# Patient Record
Sex: FEMALE | Race: WHITE | NOT HISPANIC OR LATINO | Employment: FULL TIME | URBAN - METROPOLITAN AREA
[De-identification: names, ages, dates, MRNs, and addresses within clinical notes are randomized per-mention and may not be internally consistent; named-entity substitution may affect disease eponyms.]

---

## 2017-02-15 ENCOUNTER — OFFICE VISIT (OUTPATIENT)
Dept: DERMATOLOGY | Facility: CLINIC | Age: 52
End: 2017-02-15
Payer: OTHER GOVERNMENT

## 2017-02-15 DIAGNOSIS — Z12.83 SCREENING, MALIGNANT NEOPLASM, SKIN: ICD-10-CM

## 2017-02-15 DIAGNOSIS — D23.9 DERMATOFIBROMA: ICD-10-CM

## 2017-02-15 DIAGNOSIS — Z85.828 HISTORY OF SKIN CANCER: ICD-10-CM

## 2017-02-15 DIAGNOSIS — L90.5 SCAR CONDITIONS/SKIN FIBROSIS: Primary | ICD-10-CM

## 2017-02-15 DIAGNOSIS — L82.1 SEBORRHEIC KERATOSIS: ICD-10-CM

## 2017-02-15 PROCEDURE — 99999 PR PBB SHADOW E&M-EST. PATIENT-LVL II: CPT | Mod: PBBFAC,,, | Performed by: DERMATOLOGY

## 2017-02-15 PROCEDURE — 99212 OFFICE O/P EST SF 10 MIN: CPT | Mod: PBBFAC,PO | Performed by: DERMATOLOGY

## 2017-02-15 PROCEDURE — 99213 OFFICE O/P EST LOW 20 MIN: CPT | Mod: S$PBB,,, | Performed by: DERMATOLOGY

## 2017-02-15 NOTE — PATIENT INSTRUCTIONS
Preventing Skin Cancer  Relaxing in the sun may feel good. But it isnt good for your skin. In fact, being exposed to the suns harmful rays is a major cause of skin cancer. This is a serious disease that can be life-threatening. People of all ages and backgrounds are at risk. But in most cases, skin cancer can be prevented.    Your Role in Prevention  You can act today to help prevent skin cancer. Start by avoiding the suns UV (ultraviolet) rays. And dont use tanning beds, which are no safer than the sun. Taking these steps can help keep you from getting skin cancer. It can also help prevent wrinkles and other sun-induced aging effects. Make sure your children also follow these safeguards. Now is the time to start taking preventive steps against skin cancer.  When You Are Outdoors  Protect your skin when you go outdoors during the day. Take precautions whenever you go out to eat, run errands by car or on foot, or do any outdoor activity. There isnt just one easy way to protect your skin. Its best to follow all of these steps:  · Wear tightly woven clothing that covers your skin. Put on a wide-brimmed hat to protect your face, ears, and scalp.  · Watch the clock. Try to avoid the sun between 10 a.m. and 4 p.m., when it is strongest.  · Head for the shade or create your own. Use an umbrella when sitting or strolling.  · Know that the suns rays can reflect off sand, water, and snow. This can harm your skin. Take extra care when you are near reflective surfaces.  · Keep in mind that even when the weather is hazy or cloudy, your skin can be exposed to strong UV rays.  · Shield your skin with sunscreen. Also, apply sunscreen to your childrens skin.  Tips for Using Sunscreen  To help prevent skin cancer, choose the right sunscreen and use it correctly. Try the following tips:  · Choose a sunscreen that has a sun protection factor (SPF) of at least 15. For the best protection, an SPF of at least 30 is preferred.  Also, choose a sunscreen labeled broad spectrum. This will shield you from both UVA and UVB (ultraviolet A and B) rays.  · If one brand irritates your skin, try another, particularly ones without fragrance.  · Use a water-resistant sunscreen if swimming or sweating.  · Reapply sunscreen every 2 hours. If youre active, do this more often.  · Cover any sun-exposed skin, from your face to your feet. Dont forget your ears and your lips.  · Know that while sunscreen helps protect you, it isnt enough. You should also wear protective clothing. And try to stay out of the sun as much as you can, especially from 10 a.m. to 4 p.m.  © 7228-3347 [employer]. [address] [place] [address], [place] [address]. All rights reserved. This information is not intended as a substitute for professional medical care. Always follow your healthcare professional's instructions.

## 2017-02-15 NOTE — MR AVS SNAPSHOT
[facility] - Dermatology  [address] [facility] [place] [address]  Phone: [phone]  Fax: [phone]                  Tova Muñoz   2/15/2017 3:30 PM   Office Visit    Description:  Female : 1965   Provider:  Glenis Pena MD   Department:  [facility] - Dermatology           Reason for Visit     Follow-up           Diagnoses this Visit        Comments    Scar conditions/skin fibrosis    -  Primary     Screening, malignant neoplasm, skin         History of skin cancer         Seborrheic keratosis         Dermatofibroma                To Do List           Future Appointments        Provider Department Dept Phone    3/15/2017 7:40 AM Oumou Lopez MD [place]-[facility] Medicine [phone]      Goals (5 Years of Data)     None      Follow-Up and Disposition     Return in about 6 months (around 8/15/2017).      [facility]     [facility] On Call Nurse Care Line -  Assistance  Registered nurses in the [facility] On Call Center provide clinical advisement, health education, appointment booking, and other advisory services.  Call for this free service at [phone].             Medications           Message regarding Medications     Verify the changes and/or additions to your medication regime listed below are the same as discussed with your clinician today.  If any of these changes or additions are incorrect, please notify your healthcare provider.             Verify that the below list of medications is an accurate representation of the medications you are currently taking.  If none reported, the list may be blank. If incorrect, please contact your healthcare provider. Carry this list with you in case of emergency.           Current Medications     b complex vitamins tablet Take 1 tablet by mouth once daily.    cetirizine (ZYRTEC) 10 MG tablet Take 10 mg by mouth once daily.    fluticasone (FLONASE) 50 mcg/actuation nasal spray 2 sprays by Each Nare route once daily.    progesterone  (PROMETRIUM) 100 MG capsule Take 1 capsule (100 mg total) by mouth nightly.    topiramate (TOPAMAX) 25 MG tablet Take 1 tablet (25 mg total) by mouth once daily.    sumatriptan-naproxen (TREXIMET)  mg Tab Take 1 tablet by mouth once as needed.           Clinical Reference Information           Allergies as of 2/15/2017     Sulfa (Sulfonamide Antibiotics)      Immunizations Administered on Date of Encounter - 2/15/2017     None      Instructions    Preventing Skin Cancer  Relaxing in the sun may feel good. But it isnt good for your skin. In fact, being exposed to the suns harmful rays is a major cause of skin cancer. This is a serious disease that can be life-threatening. People of all ages and backgrounds are at risk. But in most cases, skin cancer can be prevented.    Your Role in Prevention  You can act today to help prevent skin cancer. Start by avoiding the suns UV (ultraviolet) rays. And dont use tanning beds, which are no safer than the sun. Taking these steps can help keep you from getting skin cancer. It can also help prevent wrinkles and other sun-induced aging effects. Make sure your children also follow these safeguards. Now is the time to start taking preventive steps against skin cancer.  When You Are Outdoors  Protect your skin when you go outdoors during the day. Take precautions whenever you go out to eat, run errands by car or on foot, or do any outdoor activity. There isnt just one easy way to protect your skin. Its best to follow all of these steps:  · Wear tightly woven clothing that covers your skin. Put on a wide-brimmed hat to protect your face, ears, and scalp.  · Watch the clock. Try to avoid the sun between 10 a.m. and 4 p.m., when it is strongest.  · Head for the shade or create your own. Use an umbrella when sitting or strolling.  · Know that the suns rays can reflect off sand, water, and snow. This can harm your skin. Take extra care when you are near reflective  surfaces.  · Keep in mind that even when the weather is hazy or cloudy, your skin can be exposed to strong UV rays.  · Shield your skin with sunscreen. Also, apply sunscreen to your childrens skin.  Tips for Using Sunscreen  To help prevent skin cancer, choose the right sunscreen and use it correctly. Try the following tips:  · Choose a sunscreen that has a sun protection factor (SPF) of at least 15. For the best protection, an SPF of at least 30 is preferred. Also, choose a sunscreen labeled broad spectrum. This will shield you from both UVA and UVB (ultraviolet A and B) rays.  · If one brand irritates your skin, try another, particularly ones without fragrance.  · Use a water-resistant sunscreen if swimming or sweating.  · Reapply sunscreen every 2 hours. If youre active, do this more often.  · Cover any sun-exposed skin, from your face to your feet. Dont forget your ears and your lips.  · Know that while sunscreen helps protect you, it isnt enough. You should also wear protective clothing. And try to stay out of the sun as much as you can, especially from 10 a.m. to 4 p.m.  © 1830-9016 [employer]. [address] [place] [address], [place] [address]. All rights reserved. This information is not intended as a substitute for professional medical care. Always follow your healthcare professional's instructions.             Language Assistance Services     ATTENTION: Language assistance services are available, free of charge. Please call [phone].      ATENCIÓN: Si habla jeanette, tiene a herzog disposición servicios gratuitos de asistencia lingüística. Llame al [phone].     CHIQUI Ý: N?u b?n nói Ti?ng Vi?t, có các d?ch v? h? tr? ngôn ng? mi?n phí dành cho b?n. G?i s? [phone].         [facility] - Dermatology complies with applicable Federal civil rights laws and does not discriminate on the basis of race, color, national origin, age, disability, or sex.

## 2017-02-15 NOTE — PROGRESS NOTES
Subjective:       Patient ID:  Tova Muñoz is a 52 y.o. female who presents for   Chief Complaint   Patient presents with    Follow-up     3 month follow up     HPI Comments: Hx of NMSC of the left nasal ala and IDN of the right nasal ala, last seen on 6/15/16.      History of Present Illness: The patient presents with chief complaint of lesion.  Location: right forehead  Duration: 6 months  Signs/Symptoms: scaly, dryness    Prior treatments: none        Review of Systems   Constitutional: Negative for fever and chills.   Gastrointestinal: Negative for nausea and vomiting.   Skin: Positive for daily sunscreen use and activity-related sunscreen use. Negative for itching, rash and recent sunburn.   Hematologic/Lymphatic: Does not bruise/bleed easily.        Objective:    Physical Exam   Constitutional: She appears well-developed and well-nourished. No distress.   Neurological: She is alert and oriented to person, place, and time. She is not disoriented.   Psychiatric: She has a normal mood and affect.   Skin:   Areas Examined (abnormalities noted in diagram):   Head / Face Inspection Performed  Neck Inspection Performed  Chest / Axilla Inspection Performed  Abdomen Inspection Performed  Back Inspection Performed  RUE Inspected  LUE Inspection Performed  Nails and Digits Inspection Performed                   Diagram Legend     Erythematous scaling macule/papule c/w actinic keratosis       Vascular papule c/w angioma      Pigmented verrucoid papule/plaque c/w seborrheic keratosis      Yellow umbilicated papule c/w sebaceous hyperplasia      Irregularly shaped tan macule c/w lentigo     1-2 mm smooth white papules consistent with Milia      Movable subcutaneous cyst with punctum c/w epidermal inclusion cyst      Subcutaneous movable cyst c/w pilar cyst      Firm pink to brown papule c/w dermatofibroma      Pedunculated fleshy papule(s) c/w skin tag(s)      Evenly pigmented macule c/w junctional nevus     Mildly  variegated pigmented, slightly irregular-bordered macule c/w mildly atypical nevus      Flesh colored to evenly pigmented papule c/w intradermal nevus       Pink pearly papule/plaque c/w basal cell carcinoma      Erythematous hyperkeratotic cursted plaque c/w SCC      Surgical scar with no sign of skin cancer recurrence      Open and closed comedones      Inflammatory papules and pustules      Verrucoid papule consistent consistent with wart     Erythematous eczematous patches and plaques     Dystrophic onycholytic nail with subungual debris c/w onychomycosis     Umbilicated papule    Erythematous-base heme-crusted tan verrucoid plaque consistent with inflamed seborrheic keratosis     Erythematous Silvery Scaling Plaque c/w Psoriasis     See annotation      Assessment / Plan:        Scar conditions/skin fibrosis  Screening, malignant neoplasm, skin  History of skin cancer  Scar of the left nasal ala, hx of NMSC.  No evidence of recurrence on physical exam today.  Continue routine skin surveillance. Daily sunscreen advised.    Seborrheic keratosis  Reassurance given. Discussed diagnosis and that lesions are benign.      Dermatofibroma  Reassurance given.  Lesions are benign.           Return in about 6 months (around 8/15/2017).

## 2017-03-06 ENCOUNTER — PATIENT OUTREACH (OUTPATIENT)
Dept: ADMINISTRATIVE | Facility: HOSPITAL | Age: 52
End: 2017-03-06

## 2017-03-06 DIAGNOSIS — Z00.00 HEALTHCARE MAINTENANCE: Primary | ICD-10-CM

## 2017-03-06 NOTE — PROGRESS NOTES
CHART AUDITED. LAB  ORDERS ENTERED PER WOG. PORTAL MESSAGE SENT TO PATIENT TO INFORM ÓSCAR OF OVERDUE HEALTH MAINTENANCE.

## 2017-03-15 ENCOUNTER — OFFICE VISIT (OUTPATIENT)
Dept: INTERNAL MEDICINE | Facility: CLINIC | Age: 52
End: 2017-03-15
Payer: OTHER GOVERNMENT

## 2017-03-15 ENCOUNTER — LAB VISIT (OUTPATIENT)
Dept: LAB | Facility: HOSPITAL | Age: 52
End: 2017-03-15
Attending: FAMILY MEDICINE
Payer: OTHER GOVERNMENT

## 2017-03-15 ENCOUNTER — TELEPHONE (OUTPATIENT)
Dept: INTERNAL MEDICINE | Facility: CLINIC | Age: 52
End: 2017-03-15

## 2017-03-15 VITALS
WEIGHT: 175.5 LBS | SYSTOLIC BLOOD PRESSURE: 128 MMHG | DIASTOLIC BLOOD PRESSURE: 86 MMHG | TEMPERATURE: 98 F | HEIGHT: 61 IN | HEART RATE: 74 BPM | BODY MASS INDEX: 33.14 KG/M2

## 2017-03-15 DIAGNOSIS — Z00.00 HEALTHCARE MAINTENANCE: ICD-10-CM

## 2017-03-15 DIAGNOSIS — R09.82 PND (POST-NASAL DRIP): ICD-10-CM

## 2017-03-15 DIAGNOSIS — G43.909 MIGRAINE WITHOUT STATUS MIGRAINOSUS, NOT INTRACTABLE, UNSPECIFIED MIGRAINE TYPE: ICD-10-CM

## 2017-03-15 DIAGNOSIS — Z11.59 NEED FOR HEPATITIS C SCREENING TEST: ICD-10-CM

## 2017-03-15 DIAGNOSIS — J06.9 VIRAL URI WITH COUGH: ICD-10-CM

## 2017-03-15 DIAGNOSIS — Z00.00 ROUTINE GENERAL MEDICAL EXAMINATION AT A HEALTH CARE FACILITY: Primary | ICD-10-CM

## 2017-03-15 DIAGNOSIS — J30.2 SEASONAL ALLERGIC RHINITIS, UNSPECIFIED ALLERGIC RHINITIS TRIGGER: Chronic | ICD-10-CM

## 2017-03-15 DIAGNOSIS — E66.9 OBESITY, UNSPECIFIED OBESITY SEVERITY, UNSPECIFIED OBESITY TYPE: ICD-10-CM

## 2017-03-15 LAB
ALBUMIN SERPL BCP-MCNC: 4.2 G/DL
ALP SERPL-CCNC: 74 U/L
ALT SERPL W/O P-5'-P-CCNC: 43 U/L
ANION GAP SERPL CALC-SCNC: 9 MMOL/L
AST SERPL-CCNC: 32 U/L
BASOPHILS # BLD AUTO: 0.03 K/UL
BASOPHILS NFR BLD: 0.4 %
BILIRUB SERPL-MCNC: 0.7 MG/DL
BUN SERPL-MCNC: 17 MG/DL
CALCIUM SERPL-MCNC: 9.8 MG/DL
CHLORIDE SERPL-SCNC: 104 MMOL/L
CHOLEST/HDLC SERPL: 3.8 {RATIO}
CO2 SERPL-SCNC: 27 MMOL/L
CREAT SERPL-MCNC: 1 MG/DL
DIFFERENTIAL METHOD: ABNORMAL
EOSINOPHIL # BLD AUTO: 0.2 K/UL
EOSINOPHIL NFR BLD: 2.9 %
ERYTHROCYTE [DISTWIDTH] IN BLOOD BY AUTOMATED COUNT: 13.1 %
EST. GFR  (AFRICAN AMERICAN): >60 ML/MIN/1.73 M^2
EST. GFR  (NON AFRICAN AMERICAN): >60 ML/MIN/1.73 M^2
GLUCOSE SERPL-MCNC: 84 MG/DL
HCT VFR BLD AUTO: 41.7 %
HDL/CHOLESTEROL RATIO: 26.4 %
HDLC SERPL-MCNC: 265 MG/DL
HDLC SERPL-MCNC: 70 MG/DL
HGB BLD-MCNC: 13.5 G/DL
LDLC SERPL CALC-MCNC: 177.6 MG/DL
LYMPHOCYTES # BLD AUTO: 1.3 K/UL
LYMPHOCYTES NFR BLD: 16.1 %
MCH RBC QN AUTO: 30.5 PG
MCHC RBC AUTO-ENTMCNC: 32.4 %
MCV RBC AUTO: 94 FL
MONOCYTES # BLD AUTO: 0.5 K/UL
MONOCYTES NFR BLD: 6.2 %
NEUTROPHILS # BLD AUTO: 6.1 K/UL
NEUTROPHILS NFR BLD: 74.3 %
NONHDLC SERPL-MCNC: 195 MG/DL
PLATELET # BLD AUTO: 293 K/UL
PMV BLD AUTO: 10.7 FL
POTASSIUM SERPL-SCNC: 4.3 MMOL/L
PROT SERPL-MCNC: 7.9 G/DL
RBC # BLD AUTO: 4.43 M/UL
SODIUM SERPL-SCNC: 140 MMOL/L
TRIGL SERPL-MCNC: 87 MG/DL
TSH SERPL DL<=0.005 MIU/L-ACNC: 2.3 UIU/ML
WBC # BLD AUTO: 8.25 K/UL

## 2017-03-15 PROCEDURE — 96372 THER/PROPH/DIAG INJ SC/IM: CPT | Mod: PBBFAC,PO

## 2017-03-15 PROCEDURE — 80061 LIPID PANEL: CPT

## 2017-03-15 PROCEDURE — 80053 COMPREHEN METABOLIC PANEL: CPT

## 2017-03-15 PROCEDURE — 36415 COLL VENOUS BLD VENIPUNCTURE: CPT | Mod: PO

## 2017-03-15 PROCEDURE — 99999 PR PBB SHADOW E&M-EST. PATIENT-LVL III: CPT | Mod: PBBFAC,,, | Performed by: FAMILY MEDICINE

## 2017-03-15 PROCEDURE — 99396 PREV VISIT EST AGE 40-64: CPT | Mod: S$PBB,,, | Performed by: FAMILY MEDICINE

## 2017-03-15 PROCEDURE — 85025 COMPLETE CBC W/AUTO DIFF WBC: CPT

## 2017-03-15 PROCEDURE — 99213 OFFICE O/P EST LOW 20 MIN: CPT | Mod: PBBFAC,PO | Performed by: FAMILY MEDICINE

## 2017-03-15 PROCEDURE — 86803 HEPATITIS C AB TEST: CPT

## 2017-03-15 PROCEDURE — 84443 ASSAY THYROID STIM HORMONE: CPT

## 2017-03-15 RX ORDER — SUMATRIPTAN AND NAPROXEN SODIUM 85; 500 MG/1; MG/1
1 TABLET, FILM COATED ORAL ONCE AS NEEDED
Qty: 10 TABLET | Refills: 11 | Status: SHIPPED | OUTPATIENT
Start: 2017-03-15 | End: 2018-03-13 | Stop reason: SDUPTHER

## 2017-03-15 RX ORDER — BENZONATATE 100 MG/1
100 CAPSULE ORAL 3 TIMES DAILY PRN
Qty: 30 CAPSULE | Refills: 3 | Status: SHIPPED | OUTPATIENT
Start: 2017-03-15 | End: 2017-04-14

## 2017-03-15 RX ORDER — MONTELUKAST SODIUM 10 MG/1
10 TABLET ORAL NIGHTLY
COMMUNITY
End: 2017-03-15 | Stop reason: SDUPTHER

## 2017-03-15 RX ORDER — BETAMETHASONE SODIUM PHOSPHATE AND BETAMETHASONE ACETATE 3; 3 MG/ML; MG/ML
12 INJECTION, SUSPENSION INTRA-ARTICULAR; INTRALESIONAL; INTRAMUSCULAR; SOFT TISSUE
Status: COMPLETED | OUTPATIENT
Start: 2017-03-15 | End: 2017-03-15

## 2017-03-15 RX ORDER — TOPIRAMATE SPINKLE 25 MG/1
25 CAPSULE ORAL 2 TIMES DAILY
COMMUNITY
End: 2017-03-15

## 2017-03-15 RX ORDER — FLUTICASONE PROPIONATE 50 MCG
2 SPRAY, SUSPENSION (ML) NASAL DAILY
Qty: 1 BOTTLE | Refills: 11 | Status: SHIPPED | OUTPATIENT
Start: 2017-03-15 | End: 2018-03-13 | Stop reason: SDUPTHER

## 2017-03-15 RX ORDER — LORATADINE 10 MG/1
10 TABLET ORAL DAILY
COMMUNITY
End: 2017-08-02

## 2017-03-15 RX ORDER — MONTELUKAST SODIUM 10 MG/1
10 TABLET ORAL NIGHTLY
Qty: 90 TABLET | Refills: 3 | Status: SHIPPED | OUTPATIENT
Start: 2017-03-15 | End: 2018-03-13 | Stop reason: SDUPTHER

## 2017-03-15 RX ADMIN — BETAMETHASONE SODIUM PHOSPHATE AND BETAMETHASONE ACETATE 12 MG: 3; 3 INJECTION, SUSPENSION INTRA-ARTICULAR; INTRALESIONAL; INTRAMUSCULAR at 08:03

## 2017-03-15 NOTE — TELEPHONE ENCOUNTER
Called and spoke with the pharmacy, verified that we did send those scripts in to them on this patient.

## 2017-03-15 NOTE — TELEPHONE ENCOUNTER
----- Message from Rae Daniel sent at 3/15/2017  1:21 PM CDT -----  Contact: [facility]  [facility] needs to verify pt's rx for Treximet, Tessalon Pearls, Flonase and Singulair. Pls call.    [facility] - [place], [place] - [address]  [address]  [place] [address]  Phone: [phone] Fax: [phone]

## 2017-03-15 NOTE — PROGRESS NOTES
Subjective:      Patient ID: Tova Muñoz is a 52 y.o. female.    Chief Complaint: Annual Exam    HPI Comments: Patient is coming in today for prevention exam.   Migraines-   She has a history of migraines for which a been controlled with Topamax daily and which she recently weaned down to25 mg a day.  She will occasionally use Treximet about once a month for abortive therapy for the headaches.  She is interested in fully coming off the Topamax at this time.  Weight gain-patient is interested in trying a medication that may help her to lose weight.  She is interested in possibly doing contrary.  She does still occasionally a single her ALLERGIES for which she'll use Claritin and Singulair.  She recently started also with a cough and cold and started using some Tessalon Perles which have helped.    She normally sees Dr. Middleton for her gynecological visits.  She'll be due in July for mammogram as well.  Last Pap smear was normal.        Lab Results   Component Value Date    WBC 8.25 03/15/2017    HGB 13.5 03/15/2017    HCT 41.7 03/15/2017     03/15/2017    CHOL 265 (H) 03/15/2017    TRIG 87 03/15/2017    HDL 70 03/15/2017    ALT 43 03/15/2017    AST 32 03/15/2017     03/15/2017    K 4.3 03/15/2017     03/15/2017    CREATININE 1.0 03/15/2017    BUN 17 03/15/2017    CO2 27 03/15/2017    TSH 2.303 03/15/2017       Review of Systems   Constitutional: Positive for unexpected weight change. Negative for chills, fatigue and fever.   HENT: Positive for postnasal drip. Negative for ear pain and trouble swallowing.    Eyes: Negative for pain and visual disturbance.   Respiratory: Positive for cough. Negative for shortness of breath.    Cardiovascular: Negative for chest pain and leg swelling.   Gastrointestinal: Negative for abdominal pain, blood in stool, nausea and vomiting.   Endocrine: Negative for cold intolerance and heat intolerance.        Hot flashes   Genitourinary: Negative for dysuria and  frequency.   Musculoskeletal: Negative for joint swelling, myalgias and neck pain.   Skin: Negative for color change and rash.   Neurological: Negative for dizziness and headaches.   Psychiatric/Behavioral: Negative for behavioral problems, decreased concentration, dysphoric mood and sleep disturbance. The patient is not nervous/anxious.      Objective:     Physical Exam   Constitutional: She is oriented to person, place, and time. She appears well-developed and well-nourished.   HENT:   Head: Normocephalic and atraumatic.   Right Ear: Tympanic membrane and external ear normal.   Left Ear: Tympanic membrane and external ear normal.   Nose: Mucosal edema and rhinorrhea present.   Mouth/Throat: Posterior oropharyngeal erythema present.   Eyes: Conjunctivae and EOM are normal.   Neck: Normal range of motion. Neck supple. No thyromegaly present.   Cardiovascular: Normal rate and regular rhythm.  Exam reveals no gallop and no friction rub.    No murmur heard.  Pulmonary/Chest: Effort normal and breath sounds normal. No respiratory distress. She has no wheezes. She has no rales.   Abdominal: Soft. Bowel sounds are normal. She exhibits no distension. There is no tenderness. There is no rebound.   Musculoskeletal: Normal range of motion. She exhibits no edema.   Lymphadenopathy:     She has no cervical adenopathy.   Neurological: She is alert and oriented to person, place, and time.   Skin: Skin is warm and dry. No rash noted.   Psychiatric: She has a normal mood and affect. Her behavior is normal. Judgment and thought content normal.   Vitals reviewed.    Assessment:     1. Routine general medical examination at a health care facility    2. Seasonal allergic rhinitis, unspecified allergic rhinitis trigger    3. Migraine without status migrainosus, not intractable, unspecified migraine type    4. Migraine without status migrainosus, not intractable, unspecified migraine type    5. Viral URI with cough    6. PND (post-nasal  drip)    7. Obesity, unspecified obesity severity, unspecified obesity type      Plan:   Tova was seen today for annual exam.    Diagnoses and all orders for this visit:    Routine general medical examination at a health care facility-labs today, discussed health maintenance issues, discussed weight loss.    Seasonal allergic rhinitis, unspecified allergic rhinitis trigger-continue Flonase and Singulair and Claritin.      Migraine without status migrainosus, not intractable, unspecified migraine type  Comments:  doing more treximet over last few months. no menses since january. wanting to wean down off topamax on 25mg daily currently. will go to every other day for 1 mo  Orders:  -     sumatriptan-naproxen (TREXIMET)  mg Tab; Take 1 tablet by mouth once as needed.    Viral URI with cough  Comments:  celestone 12, cont with singulair, add flonase back, tessalon pearles.     PND (post-nasal drip)-continue Flonase steroid injection today  -     fluticasone (FLONASE) 50 mcg/actuation nasal spray; 2 sprays by Each Nare route once daily.    Obesity, unspecified obesity severity, unspecified obesity type  Comments:  pt weaning off topamax. once off, can message for rx for contrave. also try for [employer] smart study.     Other orders  -     montelukast (SINGULAIR) 10 mg tablet; Take 1 tablet (10 mg total) by mouth every evening.  -     benzonatate (TESSALON) 100 MG capsule; Take 1 capsule (100 mg total) by mouth 3 (three) times daily as needed for Cough.  -     betamethasone acetate-betamethasone sodium phosphate injection 12 mg; Inject 2 mLs (12 mg total) into the muscle one time.            Return in about 1 year (around 3/15/2018) for physical.

## 2017-03-16 LAB — HCV AB SERPL QL IA: NEGATIVE

## 2017-04-13 DIAGNOSIS — G43.909 MIGRAINE WITHOUT STATUS MIGRAINOSUS, NOT INTRACTABLE, UNSPECIFIED MIGRAINE TYPE: ICD-10-CM

## 2017-04-13 RX ORDER — TOPIRAMATE 25 MG/1
TABLET ORAL
Qty: 90 TABLET | Refills: 3 | Status: SHIPPED | OUTPATIENT
Start: 2017-04-13 | End: 2017-08-02

## 2017-04-17 ENCOUNTER — PATIENT MESSAGE (OUTPATIENT)
Dept: INTERNAL MEDICINE | Facility: CLINIC | Age: 52
End: 2017-04-17

## 2017-07-06 RX ORDER — PROGESTERONE 100 MG/1
100 CAPSULE ORAL NIGHTLY
Qty: 30 CAPSULE | Refills: 1 | Status: SHIPPED | OUTPATIENT
Start: 2017-07-06 | End: 2017-09-01 | Stop reason: SDUPTHER

## 2017-08-02 ENCOUNTER — OFFICE VISIT (OUTPATIENT)
Dept: INTERNAL MEDICINE | Facility: CLINIC | Age: 52
End: 2017-08-02
Payer: OTHER GOVERNMENT

## 2017-08-02 VITALS
WEIGHT: 175.5 LBS | DIASTOLIC BLOOD PRESSURE: 89 MMHG | HEART RATE: 76 BPM | BODY MASS INDEX: 33.14 KG/M2 | HEIGHT: 61 IN | TEMPERATURE: 98 F | SYSTOLIC BLOOD PRESSURE: 130 MMHG

## 2017-08-02 DIAGNOSIS — Z12.31 ENCOUNTER FOR SCREENING MAMMOGRAM FOR BREAST CANCER: ICD-10-CM

## 2017-08-02 DIAGNOSIS — R03.0 ELEVATED BLOOD PRESSURE READING: Primary | ICD-10-CM

## 2017-08-02 DIAGNOSIS — N95.1 HOT FLASHES DUE TO MENOPAUSE: ICD-10-CM

## 2017-08-02 DIAGNOSIS — G43.009 MIGRAINE WITHOUT AURA AND WITHOUT STATUS MIGRAINOSUS, NOT INTRACTABLE: ICD-10-CM

## 2017-08-02 DIAGNOSIS — E66.9 OBESITY, UNSPECIFIED OBESITY SEVERITY, UNSPECIFIED OBESITY TYPE: ICD-10-CM

## 2017-08-02 PROCEDURE — 99213 OFFICE O/P EST LOW 20 MIN: CPT | Mod: PBBFAC,PO | Performed by: FAMILY MEDICINE

## 2017-08-02 PROCEDURE — 99214 OFFICE O/P EST MOD 30 MIN: CPT | Mod: S$PBB,,, | Performed by: FAMILY MEDICINE

## 2017-08-02 PROCEDURE — 99999 PR PBB SHADOW E&M-EST. PATIENT-LVL III: CPT | Mod: PBBFAC,,, | Performed by: FAMILY MEDICINE

## 2017-08-02 RX ORDER — CLONIDINE HYDROCHLORIDE 0.1 MG/1
0.1 TABLET ORAL NIGHTLY
Qty: 30 TABLET | Refills: 5 | Status: SHIPPED | OUTPATIENT
Start: 2017-08-02 | End: 2018-03-13

## 2017-08-02 NOTE — PROGRESS NOTES
Subjective:      Patient ID: Tova Muñoz is a 52 y.o. female.    Chief Complaint: Follow-up (wt, hot flashes, BP)    Patient's coming in today for follow-up of multiple issues.  Since I last saw her she's been on the contrary now for about 3 months.  She reports it's helped a lot with cravings however her weight hasn't changed recently her scale.  She did come off the Topamax but also since then she's had vein removal surgery on the right leg done by Dr. Mccoy for this is limited her activity.  She like to try to get him for another 3 months to see how it does.  Her insurance does not pay for that with a coupon it's only around $90 for her.    From a migraine standpoint she's only having about 1 migraine per month now.  She states this is doable for her.    Initially her blood pressure was elevated today however she was drinking coffee and arrived early for 0.8.  Upon resting and rechecking her numbers had improved.  She's never had a problem before with her blood pressure.    She does report that she's been having more hot flashes and decreased sleep due to menopause around 1 and 2:00 in the morning.  She's currently on Prometrium.  She scheduled to see Dr. Middleton soon.  We discussed the potential of using clonidine to help with sleep and hot flashes.  She be interested in trying this.    She's also needing to get her mammogram the same day she sees the gynecologist.        Lab Results   Component Value Date    WBC 8.25 03/15/2017    HGB 13.5 03/15/2017    HCT 41.7 03/15/2017     03/15/2017    CHOL 265 (H) 03/15/2017    TRIG 87 03/15/2017    HDL 70 03/15/2017    ALT 43 03/15/2017    AST 32 03/15/2017     03/15/2017    K 4.3 03/15/2017     03/15/2017    CREATININE 1.0 03/15/2017    BUN 17 03/15/2017    CO2 27 03/15/2017    TSH 2.303 03/15/2017       Review of Systems   Constitutional: Negative for activity change, appetite change, fatigue and unexpected weight change.   Respiratory: Negative  for cough and shortness of breath.    Cardiovascular: Negative for chest pain and palpitations.   Endocrine:        Menopausal hot flashes   Psychiatric/Behavioral: Positive for sleep disturbance. Negative for decreased concentration and dysphoric mood. The patient is not nervous/anxious.      Objective:     Physical Exam   Constitutional: She appears well-developed and well-nourished.   HENT:   Head: Normocephalic and atraumatic.   Right Ear: Tympanic membrane normal.   Left Ear: Tympanic membrane normal.   Mouth/Throat: Oropharynx is clear and moist.   Eyes: Conjunctivae and EOM are normal.   Neck: Normal range of motion. Neck supple.   Cardiovascular: Normal rate and regular rhythm.    Pulmonary/Chest: Effort normal and breath sounds normal.   Psychiatric: She has a normal mood and affect. Her behavior is normal.   Nursing note and vitals reviewed.    Assessment:     1. Elevated blood pressure reading    2. Migraine without aura and without status migrainosus, not intractable    3. Obesity, unspecified obesity severity, unspecified obesity type    4. Hot flashes due to menopause    5. Encounter for screening mammogram for breast cancer      Plan:   Tova was seen today for follow-up.    Diagnoses and all orders for this visit:    Elevated blood pressure reading  Comments:  repeat ok. monitor at home. working on diet.     Migraine without aura and without status migrainosus, not intractable  Comments:  off topamax. 1 per month now. doing ok.     Obesity, unspecified obesity severity, unspecified obesity type  Comments:  cont with contrave. work on diet and exercise.     Hot flashes due to menopause  Comments:  trial of clondine at night. seeing Dr Middleton soon. on prometrium as well.     Encounter for screening mammogram for breast cancer  -     Mammo Digital Screening Bilat with CAD; Future    Other orders  -     naltrexone-bupropion 8-90 mg TbSR; 2 tab po bid  -     cloNIDine (CATAPRES) 0.1 MG tablet; Take 1  tablet (0.1 mg total) by mouth every evening. For hot flashes and sleep            Return in about 3 months (around 11/2/2017) for wt, blood pressure, meds.

## 2017-08-25 ENCOUNTER — OFFICE VISIT (OUTPATIENT)
Dept: OBSTETRICS AND GYNECOLOGY | Facility: CLINIC | Age: 52
End: 2017-08-25
Payer: OTHER GOVERNMENT

## 2017-08-25 VITALS
SYSTOLIC BLOOD PRESSURE: 122 MMHG | WEIGHT: 176 LBS | HEIGHT: 61 IN | DIASTOLIC BLOOD PRESSURE: 70 MMHG | BODY MASS INDEX: 33.23 KG/M2

## 2017-08-25 DIAGNOSIS — Z01.419 ENCOUNTER FOR WELL WOMAN EXAM WITH ROUTINE GYNECOLOGICAL EXAM: Primary | ICD-10-CM

## 2017-08-25 PROCEDURE — 99213 OFFICE O/P EST LOW 20 MIN: CPT | Mod: PBBFAC | Performed by: OBSTETRICS & GYNECOLOGY

## 2017-08-25 PROCEDURE — 99999 PR PBB SHADOW E&M-EST. PATIENT-LVL III: CPT | Mod: PBBFAC,,, | Performed by: OBSTETRICS & GYNECOLOGY

## 2017-08-25 PROCEDURE — 99396 PREV VISIT EST AGE 40-64: CPT | Mod: S$PBB,,, | Performed by: OBSTETRICS & GYNECOLOGY

## 2017-08-25 PROCEDURE — 88142 CYTOPATH C/V THIN LAYER: CPT

## 2017-08-26 NOTE — PROGRESS NOTES
HPI:  52 y.o. female patient  presents today for annual exam.  No complaints.  She is having very irregular menses.  No menstrual cycle for 6 months, then she had two cycles in July and light one in August.  She is having night sweats and hair loss.  Normal recent TSH.  She is taking prometrium 100 mg qhs for HRT.      Past Medical History:   Diagnosis Date    Allergy     BCC (basal cell carcinoma of skin)     nose    Migraine headache     Sinusitis        Past Surgical History:   Procedure Laterality Date    BASAL CELL CARCINOMA EXCISION  2015    breast augumentation       SECTION      x2    COLONOSCOPY N/A 2016    Procedure: COLONOSCOPY;  Surgeon: Dali Rodriguez MD;  Location: [facility];  Service: Endoscopy;  Laterality: N/A;    LASIX      SKIN BIOPSY      TUBAL LIGATION         REVIEW OF SYSTEMS:  GENERAL:  No fever, chills, fatigue, or weight loss  ABDOMEN:  Normal appetite, no weight loss or abdominal pain  URINARY:  No flank pain, dysuria, or hematuria  REPRODUCTIVE:  No abnormal vaginal bleeding  BREASTS:  No tenderness, masses, or nipple discharge noted of breasts    PHYSICAL EXAM:    APPEARANCE:  Well nourished, well developed, in no acute distress  NECK:  Symmetric without masses or thyromegaly  BREASTS:  Symmetrical, no skin changes or visible lesions.  No palpable masses, nipple discharge, or adenopathy bilaterally.  ABDOMEN:  Soft, no tenderness or masses, no distension noted  PELVIC:  VULVA:  No lesions.  Normal female genitalia  URETHRAL MEATUS:  Normal size and location.  No lesions.  No prolapse  URETHRA:  No masses, tenderness, prolapse, or scarring  VAGINA:  No lesions or discharge.  No significant cystocele or rectocele  CERVIX:  No lesions.  Normal diameter, no cervical motion tenderness.  UTERUS:  4-6 week size, regular shape, mobile, non-tender, normal position, good support  ADNEXA:  No masses or tenderness  ANUS AND PERINEUM:  No lesions.  No external  hemorrhoids    1. Encounter for well woman exam with routine gynecological exam  Liquid-based pap smear, screening         PLAN:  MMG scheduled.  Patient counseled regarding recommended routine health maintenance for her age.  Treatment options for menopausal symptoms discussed.  She will continue on prometrium for now.

## 2017-09-01 RX ORDER — PROGESTERONE 100 MG/1
100 CAPSULE ORAL NIGHTLY
Qty: 30 CAPSULE | Refills: 11 | Status: SHIPPED | OUTPATIENT
Start: 2017-09-01 | End: 2018-10-01 | Stop reason: SDUPTHER

## 2017-09-06 ENCOUNTER — HOSPITAL ENCOUNTER (OUTPATIENT)
Dept: RADIOLOGY | Facility: HOSPITAL | Age: 52
Discharge: HOME | End: 2017-09-06
Attending: FAMILY MEDICINE
Payer: OTHER GOVERNMENT

## 2017-09-06 VITALS — WEIGHT: 176 LBS | HEIGHT: 61 IN | BODY MASS INDEX: 33.23 KG/M2

## 2017-09-06 DIAGNOSIS — Z12.31 ENCOUNTER FOR SCREENING MAMMOGRAM FOR BREAST CANCER: ICD-10-CM

## 2017-09-06 PROCEDURE — 77067 SCR MAMMO BI INCL CAD: CPT | Mod: 26,,, | Performed by: RADIOLOGY

## 2017-09-06 PROCEDURE — 77067 SCR MAMMO BI INCL CAD: CPT | Mod: TC

## 2017-10-04 ENCOUNTER — PATIENT MESSAGE (OUTPATIENT)
Dept: INTERNAL MEDICINE | Facility: CLINIC | Age: 52
End: 2017-10-04

## 2017-10-11 ENCOUNTER — TELEPHONE (OUTPATIENT)
Dept: INTERNAL MEDICINE | Facility: CLINIC | Age: 52
End: 2017-10-11

## 2017-10-11 NOTE — TELEPHONE ENCOUNTER
----- Message from Indy Robison sent at 10/11/2017 12:52 PM CDT -----  Contact: Pt  Pt request a call from nurse to get a PA for Treximet refill, ...  [facility] - [place] - [address]  [address]  [place] [address]  Phone: [phone] Fax: [phone]    Please contact pt at [phone]

## 2017-10-16 ENCOUNTER — PATIENT MESSAGE (OUTPATIENT)
Dept: INTERNAL MEDICINE | Facility: CLINIC | Age: 52
End: 2017-10-16

## 2017-11-07 ENCOUNTER — OFFICE VISIT (OUTPATIENT)
Dept: INTERNAL MEDICINE | Facility: CLINIC | Age: 52
End: 2017-11-07
Payer: OTHER GOVERNMENT

## 2017-11-07 VITALS
HEIGHT: 61 IN | WEIGHT: 174.38 LBS | HEART RATE: 79 BPM | DIASTOLIC BLOOD PRESSURE: 88 MMHG | BODY MASS INDEX: 32.92 KG/M2 | SYSTOLIC BLOOD PRESSURE: 120 MMHG | TEMPERATURE: 99 F

## 2017-11-07 DIAGNOSIS — R03.0 ELEVATED BP WITHOUT DIAGNOSIS OF HYPERTENSION: Primary | ICD-10-CM

## 2017-11-07 DIAGNOSIS — G89.29 CHRONIC NONINTRACTABLE HEADACHE, UNSPECIFIED HEADACHE TYPE: ICD-10-CM

## 2017-11-07 DIAGNOSIS — E66.9 OBESITY, UNSPECIFIED CLASSIFICATION, UNSPECIFIED OBESITY TYPE, UNSPECIFIED WHETHER SERIOUS COMORBIDITY PRESENT: ICD-10-CM

## 2017-11-07 DIAGNOSIS — R51.9 CHRONIC NONINTRACTABLE HEADACHE, UNSPECIFIED HEADACHE TYPE: ICD-10-CM

## 2017-11-07 DIAGNOSIS — N95.1 HOT FLASHES DUE TO MENOPAUSE: ICD-10-CM

## 2017-11-07 PROCEDURE — 99213 OFFICE O/P EST LOW 20 MIN: CPT | Mod: PBBFAC,PO | Performed by: FAMILY MEDICINE

## 2017-11-07 PROCEDURE — 99999 PR PBB SHADOW E&M-EST. PATIENT-LVL III: CPT | Mod: PBBFAC,,, | Performed by: FAMILY MEDICINE

## 2017-11-07 PROCEDURE — 99214 OFFICE O/P EST MOD 30 MIN: CPT | Mod: S$PBB,,, | Performed by: FAMILY MEDICINE

## 2017-11-08 PROBLEM — R03.0 ELEVATED BP WITHOUT DIAGNOSIS OF HYPERTENSION: Status: ACTIVE | Noted: 2017-11-08

## 2017-11-08 PROBLEM — E66.9 OBESITY: Status: ACTIVE | Noted: 2017-11-08

## 2017-11-08 PROBLEM — N95.1 HOT FLASHES DUE TO MENOPAUSE: Status: ACTIVE | Noted: 2017-11-08

## 2017-11-08 PROBLEM — G89.29 CHRONIC NONINTRACTABLE HEADACHE: Status: ACTIVE | Noted: 2017-11-08

## 2017-11-08 PROBLEM — R51.9 CHRONIC NONINTRACTABLE HEADACHE: Status: ACTIVE | Noted: 2017-11-08

## 2017-11-08 NOTE — PROGRESS NOTES
Subjective:      Patient ID: Tova Muñoz is a 52 y.o. female.    Chief Complaint: Weight Check    Patient's coming in today for follow-up of multiple issues.  Since I last saw her she's been on the contrave now for about 6 months.  She reports it's helped a lot with cravings .  She feels like her weight is just now starting to go down 1 pound per week.  She's also doing plexus.  She has stopped the Topamax.      She reports however she's been having headaches about one per week.  Sometimes this wakes her up in the middle the night.  She has a history of recurrent migraines.  She does not want to go back on the Topamax at this time however.    She also has a history with hot flashes worse lately.  Reports that she is not taking the clonidine yet but does have it filled.  Blood pressure is initially elevated today.  Has been drinking 2 cups of coffee in the a.m.  Upon repeat after she rested in the room it was normalized to 120/88.              Lab Results   Component Value Date    WBC 8.25 03/15/2017    HGB 13.5 03/15/2017    HCT 41.7 03/15/2017     03/15/2017    CHOL 265 (H) 03/15/2017    TRIG 87 03/15/2017    HDL 70 03/15/2017    ALT 43 03/15/2017    AST 32 03/15/2017     03/15/2017    K 4.3 03/15/2017     03/15/2017    CREATININE 1.0 03/15/2017    BUN 17 03/15/2017    CO2 27 03/15/2017    TSH 2.303 03/15/2017       Review of Systems   Constitutional: Negative for activity change and unexpected weight change.   HENT: Negative for hearing loss, rhinorrhea and trouble swallowing.    Eyes: Negative for discharge and visual disturbance.   Respiratory: Negative for chest tightness and wheezing.    Cardiovascular: Negative for chest pain and palpitations.   Gastrointestinal: Negative for blood in stool, constipation, diarrhea and vomiting.   Endocrine: Negative for polydipsia and polyuria.   Genitourinary: Negative for difficulty urinating, dysuria, hematuria and menstrual problem.    Musculoskeletal: Positive for arthralgias. Negative for joint swelling and neck pain.   Neurological: Positive for headaches. Negative for weakness.   Psychiatric/Behavioral: Positive for sleep disturbance. Negative for confusion and dysphoric mood.     Objective:     Physical Exam   Constitutional: She appears well-developed and well-nourished.   Obese white female   HENT:   Head: Normocephalic and atraumatic.   Right Ear: Tympanic membrane normal.   Left Ear: Tympanic membrane normal.   Mouth/Throat: Oropharynx is clear and moist.   Eyes: Conjunctivae and EOM are normal.   Neck: Normal range of motion. Neck supple.   Cardiovascular: Normal rate and regular rhythm.    Pulmonary/Chest: Effort normal and breath sounds normal.   Psychiatric: She has a normal mood and affect. Her behavior is normal.   Nursing note and vitals reviewed.    Assessment:     1. Elevated BP without diagnosis of hypertension    2. Hot flashes due to menopause    3. Chronic nonintractable headache, unspecified headache type      Plan:   Tova was seen today for weight check.    Diagnoses and all orders for this visit:    Elevated BP without diagnosis of hypertension-in a.m.  Comments:  going to start clonidine 0.1 mg at night to help with bp, sleep and ha.     Hot flashes due to menopause-not controlled  Comments:  going to start clonidine 0.1 mg at night to help with bp, sleep and ha.     Chronic nonintractable headache, unspecified headache type-not controlled  Comments:  going to start clonidine 0.1 mg at night to help with bp, sleep and ha.     Obesity-continue with medications.  -     naltrexone-bupropion 8-90 mg TbSR; Take 2 tablets by mouth 2 (two) times daily.            Return in about 3 months (around 2/7/2018) for F/u WT, Labs, Meds Dr Lopez.

## 2018-01-24 ENCOUNTER — PATIENT OUTREACH (OUTPATIENT)
Dept: ADMINISTRATIVE | Facility: HOSPITAL | Age: 53
End: 2018-01-24

## 2018-01-24 DIAGNOSIS — Z00.00 ANNUAL PHYSICAL EXAM: Primary | ICD-10-CM

## 2018-01-30 ENCOUNTER — PATIENT OUTREACH (OUTPATIENT)
Dept: ADMINISTRATIVE | Facility: HOSPITAL | Age: 53
End: 2018-01-30

## 2018-03-09 ENCOUNTER — LAB VISIT (OUTPATIENT)
Dept: LAB | Facility: HOSPITAL | Age: 53
End: 2018-03-09
Attending: FAMILY MEDICINE
Payer: OTHER GOVERNMENT

## 2018-03-09 DIAGNOSIS — Z00.00 ANNUAL PHYSICAL EXAM: ICD-10-CM

## 2018-03-09 LAB
ALBUMIN SERPL BCP-MCNC: 4.1 G/DL
ALP SERPL-CCNC: 58 U/L
ALT SERPL W/O P-5'-P-CCNC: 15 U/L
ANION GAP SERPL CALC-SCNC: 8 MMOL/L
AST SERPL-CCNC: 18 U/L
BASOPHILS # BLD AUTO: 0.03 K/UL
BASOPHILS NFR BLD: 0.5 %
BILIRUB SERPL-MCNC: 0.5 MG/DL
BUN SERPL-MCNC: 17 MG/DL
CALCIUM SERPL-MCNC: 9.2 MG/DL
CHLORIDE SERPL-SCNC: 105 MMOL/L
CHOLEST SERPL-MCNC: 253 MG/DL
CHOLEST/HDLC SERPL: 3.3 {RATIO}
CO2 SERPL-SCNC: 25 MMOL/L
CREAT SERPL-MCNC: 1 MG/DL
DIFFERENTIAL METHOD: NORMAL
EOSINOPHIL # BLD AUTO: 0.1 K/UL
EOSINOPHIL NFR BLD: 2.3 %
ERYTHROCYTE [DISTWIDTH] IN BLOOD BY AUTOMATED COUNT: 12.9 %
EST. GFR  (AFRICAN AMERICAN): >60 ML/MIN/1.73 M^2
EST. GFR  (NON AFRICAN AMERICAN): >60 ML/MIN/1.73 M^2
GLUCOSE SERPL-MCNC: 74 MG/DL
HCT VFR BLD AUTO: 41 %
HDLC SERPL-MCNC: 77 MG/DL
HDLC SERPL: 30.4 %
HGB BLD-MCNC: 13.1 G/DL
IMM GRANULOCYTES # BLD AUTO: 0.01 K/UL
IMM GRANULOCYTES NFR BLD AUTO: 0.2 %
LDLC SERPL CALC-MCNC: 162.8 MG/DL
LYMPHOCYTES # BLD AUTO: 1.5 K/UL
LYMPHOCYTES NFR BLD: 26.7 %
MCH RBC QN AUTO: 30.9 PG
MCHC RBC AUTO-ENTMCNC: 32 G/DL
MCV RBC AUTO: 97 FL
MONOCYTES # BLD AUTO: 0.4 K/UL
MONOCYTES NFR BLD: 7.5 %
NEUTROPHILS # BLD AUTO: 3.6 K/UL
NEUTROPHILS NFR BLD: 62.8 %
NONHDLC SERPL-MCNC: 176 MG/DL
NRBC BLD-RTO: 0 /100 WBC
PLATELET # BLD AUTO: 262 K/UL
PMV BLD AUTO: 10.8 FL
POTASSIUM SERPL-SCNC: 4 MMOL/L
PROT SERPL-MCNC: 7.3 G/DL
RBC # BLD AUTO: 4.24 M/UL
SODIUM SERPL-SCNC: 138 MMOL/L
TRIGL SERPL-MCNC: 66 MG/DL
TSH SERPL DL<=0.005 MIU/L-ACNC: 2.66 UIU/ML
WBC # BLD AUTO: 5.76 K/UL

## 2018-03-09 PROCEDURE — 80061 LIPID PANEL: CPT

## 2018-03-09 PROCEDURE — 85025 COMPLETE CBC W/AUTO DIFF WBC: CPT

## 2018-03-09 PROCEDURE — 36415 COLL VENOUS BLD VENIPUNCTURE: CPT | Mod: PO

## 2018-03-09 PROCEDURE — 80053 COMPREHEN METABOLIC PANEL: CPT

## 2018-03-09 PROCEDURE — 84443 ASSAY THYROID STIM HORMONE: CPT

## 2018-03-13 ENCOUNTER — OFFICE VISIT (OUTPATIENT)
Dept: INTERNAL MEDICINE | Facility: CLINIC | Age: 53
End: 2018-03-13
Payer: OTHER GOVERNMENT

## 2018-03-13 VITALS
DIASTOLIC BLOOD PRESSURE: 86 MMHG | SYSTOLIC BLOOD PRESSURE: 128 MMHG | WEIGHT: 168.19 LBS | BODY MASS INDEX: 31.75 KG/M2 | TEMPERATURE: 98 F | HEART RATE: 74 BPM | HEIGHT: 61 IN

## 2018-03-13 DIAGNOSIS — R03.0 ELEVATED BP WITHOUT DIAGNOSIS OF HYPERTENSION: ICD-10-CM

## 2018-03-13 DIAGNOSIS — N95.1 HOT FLASHES DUE TO MENOPAUSE: ICD-10-CM

## 2018-03-13 DIAGNOSIS — G43.909 MIGRAINE WITHOUT STATUS MIGRAINOSUS, NOT INTRACTABLE, UNSPECIFIED MIGRAINE TYPE: ICD-10-CM

## 2018-03-13 DIAGNOSIS — E66.9 OBESITY, UNSPECIFIED CLASSIFICATION, UNSPECIFIED OBESITY TYPE, UNSPECIFIED WHETHER SERIOUS COMORBIDITY PRESENT: ICD-10-CM

## 2018-03-13 DIAGNOSIS — J30.2 CHRONIC SEASONAL ALLERGIC RHINITIS, UNSPECIFIED TRIGGER: Chronic | ICD-10-CM

## 2018-03-13 DIAGNOSIS — R51.9 CHRONIC NONINTRACTABLE HEADACHE, UNSPECIFIED HEADACHE TYPE: ICD-10-CM

## 2018-03-13 DIAGNOSIS — R09.82 PND (POST-NASAL DRIP): ICD-10-CM

## 2018-03-13 DIAGNOSIS — Z00.00 ROUTINE GENERAL MEDICAL EXAMINATION AT A HEALTH CARE FACILITY: Primary | ICD-10-CM

## 2018-03-13 DIAGNOSIS — G89.29 CHRONIC NONINTRACTABLE HEADACHE, UNSPECIFIED HEADACHE TYPE: ICD-10-CM

## 2018-03-13 PROCEDURE — 99213 OFFICE O/P EST LOW 20 MIN: CPT | Mod: PBBFAC,PO | Performed by: FAMILY MEDICINE

## 2018-03-13 PROCEDURE — 99999 PR PBB SHADOW E&M-EST. PATIENT-LVL III: CPT | Mod: PBBFAC,,, | Performed by: FAMILY MEDICINE

## 2018-03-13 PROCEDURE — 99396 PREV VISIT EST AGE 40-64: CPT | Mod: S$PBB,,, | Performed by: FAMILY MEDICINE

## 2018-03-13 RX ORDER — PROPRANOLOL HYDROCHLORIDE 60 MG/1
60 TABLET ORAL 3 TIMES DAILY
Qty: 90 TABLET | Refills: 11 | Status: SHIPPED | OUTPATIENT
Start: 2018-03-13 | End: 2018-06-11 | Stop reason: SDUPTHER

## 2018-03-13 RX ORDER — MONTELUKAST SODIUM 10 MG/1
10 TABLET ORAL NIGHTLY
Qty: 90 TABLET | Refills: 3 | Status: SHIPPED | OUTPATIENT
Start: 2018-03-13 | End: 2021-12-28

## 2018-03-13 RX ORDER — SUMATRIPTAN AND NAPROXEN SODIUM 85; 500 MG/1; MG/1
1 TABLET, FILM COATED ORAL ONCE AS NEEDED
Qty: 10 TABLET | Refills: 11 | Status: SHIPPED | OUTPATIENT
Start: 2018-03-13 | End: 2018-10-24

## 2018-03-13 RX ORDER — FLUTICASONE PROPIONATE 50 MCG
2 SPRAY, SUSPENSION (ML) NASAL DAILY
Qty: 1 BOTTLE | Refills: 11 | Status: SHIPPED | OUTPATIENT
Start: 2018-03-13 | End: 2021-12-28

## 2018-03-13 NOTE — PROGRESS NOTES
Subjective:      Patient ID: Tova Muñoz is a 53 y.o. female.    Chief Complaint: Follow-up and Annual Exam    Disclaimer:  This note is prepared using voice recognition software and as such is likely to have errors and has not been proof read. Please contact me for questions.     Patient is coming in today for prevention exam.   Migraines-   She has a history of migraines for which a been controlled with Topamax daily and which she recently off. Doesn't want to go back on. havnig 2-3 per week. Mostly at night. waking her up. Tries alevel or execedrin migraine but if not successful then doing the Treximet.  Willing to try propranolol.     Weight gain-patient is doing the contrave down 8 lbs. Watching portions. Less cravings.     She does use the Claritin and Singulair.  Worse lately.  Taking nightly. flonase daily.     She normally sees Dr. Middleton for her gynecological visits.  She'll be due in July for mammogram as well.  Last Pap smear was normal.      She also has a history with hot flashes worse lately reports that the clonidine made it worse.         Lab Results   Component Value Date    WBC 5.76 03/09/2018    HGB 13.1 03/09/2018    HCT 41.0 03/09/2018     03/09/2018    CHOL 253 (H) 03/09/2018    TRIG 66 03/09/2018    HDL 77 (H) 03/09/2018    ALT 15 03/09/2018    AST 18 03/09/2018     03/09/2018    K 4.0 03/09/2018     03/09/2018    CREATININE 1.0 03/09/2018    BUN 17 03/09/2018    CO2 25 03/09/2018    TSH 2.661 03/09/2018       Review of Systems   Constitutional: Negative for activity change, fatigue and unexpected weight change.        Intentional wt loss. 8lb.    HENT: Negative for hearing loss, rhinorrhea and trouble swallowing.    Eyes: Negative for discharge.   Respiratory: Negative for chest tightness and wheezing.    Cardiovascular: Negative for chest pain and palpitations.   Gastrointestinal: Negative for blood in stool, constipation, diarrhea and vomiting.   Endocrine:  "Negative for polydipsia and polyuria.   Genitourinary: Negative for difficulty urinating, dysuria, hematuria and menstrual problem.   Musculoskeletal: Negative for joint swelling and neck pain.   Neurological: Positive for headaches. Negative for weakness.   Psychiatric/Behavioral: Positive for sleep disturbance. Negative for confusion and dysphoric mood.     Objective:     Vitals:    03/13/18 0752   BP: 128/86   Pulse: 74   Temp: 98.1 °F (36.7 °C)   TempSrc: Tympanic   Weight: 76.3 kg (168 lb 3.4 oz)   Height: 5' 1" (1.549 m)     Physical Exam   Constitutional: She appears well-developed and well-nourished.   HENT:   Head: Normocephalic and atraumatic.   Right Ear: Tympanic membrane normal.   Left Ear: Tympanic membrane normal.   Mouth/Throat: Oropharynx is clear and moist.   Eyes: Conjunctivae and EOM are normal.   Neck: Normal range of motion. Neck supple.   Cardiovascular: Normal rate and regular rhythm.    Pulmonary/Chest: Effort normal and breath sounds normal.   Abdominal: Soft. Bowel sounds are normal. She exhibits no distension and no mass. There is no tenderness. There is no guarding.   Psychiatric: She has a normal mood and affect. Her behavior is normal.   Nursing note and vitals reviewed.    Assessment:     1. Routine general medical examination at a health care facility    2. Chronic nonintractable headache, unspecified headache type    3. Elevated BP without diagnosis of hypertension    4. Hot flashes due to menopause    5. Obesity, unspecified classification, unspecified obesity type, unspecified whether serious comorbidity present    6. Chronic seasonal allergic rhinitis, unspecified trigger    7. Chronic migraine without aura without status migrainosus, not intractable    8. Migraine without status migrainosus, not intractable, unspecified migraine type    9. PND (post-nasal drip)      Plan:   Tova was seen today for follow-up and annual exam.    Diagnoses and all orders for this visit:    Routine " general medical examination at a health care facility - labs reviewed. Discussed Health Maintenance issues.       Chronic nonintractable headache, unspecified headache type- worse lately since off topamax. Not wanting to restart. Add propranolol at night can increase to bid to tid.     Elevated BP without diagnosis of hypertension adding propranolol.     Hot flashes due to menopause- not improved with clonidine. Still on progesterone.     Obesity, unspecified classification, unspecified obesity type, unspecified whether serious comorbidity present- wt down 8lbs. On contrave. Discussed diet changes with lower sugar.     Chronic seasonal allergic rhinitis, unspecified trigger worse lately. Increase flonase to bid.     Chronic migraine without aura without status migrainosus, not intractable- refilled treximet.       Orders:  -     SUMAtriptan-naproxen (TREXIMET)  mg Tab; Take 1 tablet by mouth once as needed.    PND (post-nasal drip)- refilled.   -     fluticasone (FLONASE) 50 mcg/actuation nasal spray; 2 sprays (100 mcg total) by Each Nare route once daily.    Other orders  -     propranolol (INDERAL) 60 MG tablet; Take 1 tablet (60 mg total) by mouth 3 (three) times daily. For prevention of migraines.  -     naltrexone-bupropion 8-90 mg TbSR; Take 2 tablets by mouth 2 (two) times daily.  -     montelukast (SINGULAIR) 10 mg tablet; Take 1 tablet (10 mg total) by mouth every evening.            Follow-up in about 3 months (around 6/13/2018) for F/u WT, Labs, Meds Dr Lopez.

## 2018-06-11 ENCOUNTER — OFFICE VISIT (OUTPATIENT)
Dept: INTERNAL MEDICINE | Facility: CLINIC | Age: 53
End: 2018-06-11
Payer: OTHER GOVERNMENT

## 2018-06-11 VITALS
DIASTOLIC BLOOD PRESSURE: 78 MMHG | HEIGHT: 61 IN | HEART RATE: 70 BPM | SYSTOLIC BLOOD PRESSURE: 110 MMHG | BODY MASS INDEX: 32.76 KG/M2 | TEMPERATURE: 98 F | WEIGHT: 173.5 LBS

## 2018-06-11 DIAGNOSIS — J30.2 SEASONAL ALLERGIC RHINITIS, UNSPECIFIED TRIGGER: Chronic | ICD-10-CM

## 2018-06-11 DIAGNOSIS — R51.9 CHRONIC NONINTRACTABLE HEADACHE, UNSPECIFIED HEADACHE TYPE: Primary | ICD-10-CM

## 2018-06-11 DIAGNOSIS — E66.9 OBESITY, UNSPECIFIED CLASSIFICATION, UNSPECIFIED OBESITY TYPE, UNSPECIFIED WHETHER SERIOUS COMORBIDITY PRESENT: ICD-10-CM

## 2018-06-11 DIAGNOSIS — G89.29 CHRONIC NONINTRACTABLE HEADACHE, UNSPECIFIED HEADACHE TYPE: Primary | ICD-10-CM

## 2018-06-11 PROCEDURE — 99999 PR PBB SHADOW E&M-EST. PATIENT-LVL III: CPT | Mod: PBBFAC,,, | Performed by: FAMILY MEDICINE

## 2018-06-11 PROCEDURE — 99213 OFFICE O/P EST LOW 20 MIN: CPT | Mod: PBBFAC,PO | Performed by: FAMILY MEDICINE

## 2018-06-11 PROCEDURE — 99214 OFFICE O/P EST MOD 30 MIN: CPT | Mod: S$PBB,,, | Performed by: FAMILY MEDICINE

## 2018-06-11 RX ORDER — PROPRANOLOL HYDROCHLORIDE 60 MG/1
60 TABLET ORAL NIGHTLY
Qty: 90 TABLET | Refills: 3
Start: 2018-06-11 | End: 2020-07-28 | Stop reason: SDUPTHER

## 2018-06-11 RX ORDER — PHENTERMINE HYDROCHLORIDE 37.5 MG/1
TABLET ORAL
Qty: 30 TABLET | Refills: 0 | Status: SHIPPED | OUTPATIENT
Start: 2018-06-11 | End: 2018-07-17 | Stop reason: SDUPTHER

## 2018-06-11 NOTE — PROGRESS NOTES
Subjective:      Patient ID: Tova Muñoz is a 53 y.o. female.    Chief Complaint: Other (weight )    Disclaimer:  This note is prepared using voice recognition software and as such is likely to have errors and has not been proof read. Please contact me for questions.     Patient is coming in today for f/u of chronic issues.     Migraines-   She has a history of migraines for which a been controlled again.  Does not want to go back on Topamax.  Finds that the propanolol at 60 mg at nighttime seems to be helping a good bit.  Much less frequent.  Wants to continue with this.    Weight gain-patient is doing the contrave but now weight is back again up around 5 lb.  Does not really feel that the contrary was helping much now.  Is not as motivated to exercise.  Previously ran but injured her knee.  Would be willing to do possibly some swimming.  Has a good friend may also help her to stay accountable.  No issues with high blood pressure at this time or palpitations.    She does use the Claritin and Singulair and flonase. Improved.     She normally sees Dr. Middleton for her gynecological visits.          Lab Results   Component Value Date    WBC 5.76 03/09/2018    HGB 13.1 03/09/2018    HCT 41.0 03/09/2018     03/09/2018    CHOL 253 (H) 03/09/2018    TRIG 66 03/09/2018    HDL 77 (H) 03/09/2018    ALT 15 03/09/2018    AST 18 03/09/2018     03/09/2018    K 4.0 03/09/2018     03/09/2018    CREATININE 1.0 03/09/2018    BUN 17 03/09/2018    CO2 25 03/09/2018    TSH 2.661 03/09/2018       Review of Systems   Constitutional: Positive for appetite change, fatigue and unexpected weight change. Negative for activity change.   HENT: Negative for hearing loss, rhinorrhea and trouble swallowing.    Eyes: Negative for discharge and visual disturbance.   Respiratory: Negative for chest tightness and wheezing.    Cardiovascular: Negative for chest pain and palpitations.   Gastrointestinal: Positive for constipation.  "Negative for blood in stool, diarrhea and vomiting.   Endocrine: Negative for polydipsia and polyuria.   Genitourinary: Negative for difficulty urinating, dysuria, hematuria and menstrual problem.   Musculoskeletal: Positive for arthralgias. Negative for joint swelling and neck pain.   Neurological: Negative for weakness and headaches.   Psychiatric/Behavioral: Negative for confusion and dysphoric mood.     Objective:     Vitals:    06/11/18 0802   BP: 110/78   Pulse: 70   Temp: 97.6 °F (36.4 °C)   TempSrc: Tympanic   Weight: 78.7 kg (173 lb 8 oz)   Height: 5' 1" (1.549 m)     Physical Exam   Constitutional: She appears well-developed and well-nourished.   bmi at 32, O WF   HENT:   Head: Normocephalic and atraumatic.   Right Ear: Tympanic membrane normal.   Left Ear: Tympanic membrane normal.   Mouth/Throat: Oropharynx is clear and moist.   Eyes: Conjunctivae and EOM are normal.   Neck: Normal range of motion. Neck supple.   Cardiovascular: Normal rate and regular rhythm.    Pulmonary/Chest: Effort normal and breath sounds normal.   Psychiatric: She has a normal mood and affect. Her behavior is normal.   Nursing note and vitals reviewed.    Assessment:     1. Chronic nonintractable headache, unspecified headache type    2. Obesity, unspecified classification, unspecified obesity type, unspecified whether serious comorbidity present    3. Seasonal allergic rhinitis, unspecified trigger      Plan:   Tova was seen today for other.    Diagnoses and all orders for this visit:    Chronic nonintractable headache, unspecified headache type-stable with propanolol at 60 mg at night however with adding stimulant therapy for weight loss may need did increase dosing.  She will follow back up in 5 weeks.    Obesity, unspecified classification, unspecified obesity type, unspecified whether serious comorbidity present-wean down to 1 tablet a contribute daily x1 week then stop.  Start then half tablet to a full tablet of Adipex " daily in the a.m. with water.  Discussed possible risk factors of increasing blood pressure and palpitations and dry mouth.  Begin daily exercise looking to swimming.  Discussed dietary changes and motivational issues.  Follow back up in 5 weeks for blood pressure and weight check.    Seasonal allergic rhinitis, unspecified trigger-stable currently with Flonase Singulair and Zyrtec.    Other orders  -     propranolol (INDERAL) 60 MG tablet; Take 1 tablet (60 mg total) by mouth every evening. For prevention of migraines.  -     phentermine (ADIPEX-P) 37.5 mg tablet; 1/2- 1 tab po QAM            Follow-up in about 5 weeks (around 7/16/2018) for F/u WT, Meds Dr Lopez.

## 2018-07-09 ENCOUNTER — PATIENT MESSAGE (OUTPATIENT)
Dept: INTERNAL MEDICINE | Facility: CLINIC | Age: 53
End: 2018-07-09

## 2018-07-16 ENCOUNTER — PATIENT MESSAGE (OUTPATIENT)
Dept: INTERNAL MEDICINE | Facility: CLINIC | Age: 53
End: 2018-07-16

## 2018-07-17 ENCOUNTER — OFFICE VISIT (OUTPATIENT)
Dept: INTERNAL MEDICINE | Facility: CLINIC | Age: 53
End: 2018-07-17
Payer: OTHER GOVERNMENT

## 2018-07-17 VITALS
SYSTOLIC BLOOD PRESSURE: 110 MMHG | HEART RATE: 68 BPM | WEIGHT: 168.19 LBS | BODY MASS INDEX: 31.75 KG/M2 | DIASTOLIC BLOOD PRESSURE: 84 MMHG | TEMPERATURE: 98 F | HEIGHT: 61 IN

## 2018-07-17 DIAGNOSIS — R03.0 ELEVATED BP WITHOUT DIAGNOSIS OF HYPERTENSION: Primary | ICD-10-CM

## 2018-07-17 DIAGNOSIS — J30.2 SEASONAL ALLERGIC RHINITIS, UNSPECIFIED TRIGGER: Chronic | ICD-10-CM

## 2018-07-17 DIAGNOSIS — G89.29 CHRONIC NONINTRACTABLE HEADACHE, UNSPECIFIED HEADACHE TYPE: ICD-10-CM

## 2018-07-17 DIAGNOSIS — E66.9 CLASS 1 OBESITY WITHOUT SERIOUS COMORBIDITY WITH BODY MASS INDEX (BMI) OF 31.0 TO 31.9 IN ADULT, UNSPECIFIED OBESITY TYPE: ICD-10-CM

## 2018-07-17 DIAGNOSIS — R51.9 CHRONIC NONINTRACTABLE HEADACHE, UNSPECIFIED HEADACHE TYPE: ICD-10-CM

## 2018-07-17 PROCEDURE — 99213 OFFICE O/P EST LOW 20 MIN: CPT | Mod: PBBFAC,PO | Performed by: FAMILY MEDICINE

## 2018-07-17 PROCEDURE — 99999 PR PBB SHADOW E&M-EST. PATIENT-LVL III: CPT | Mod: PBBFAC,,, | Performed by: FAMILY MEDICINE

## 2018-07-17 PROCEDURE — 99214 OFFICE O/P EST MOD 30 MIN: CPT | Mod: S$PBB,,, | Performed by: FAMILY MEDICINE

## 2018-07-17 RX ORDER — PHENTERMINE HYDROCHLORIDE 37.5 MG/1
TABLET ORAL
Qty: 30 TABLET | Refills: 0 | Status: SHIPPED | OUTPATIENT
Start: 2018-07-17 | End: 2018-10-04

## 2018-07-17 NOTE — PROGRESS NOTES
Subjective:      Patient ID: Tova Muñoz is a 53 y.o. female.    Chief Complaint: Weight Check    Disclaimer:  This note is prepared using voice recognition software and as such is likely to have errors and has not been proof read. Please contact me for questions.     Patient is coming in today for f/u of chronic issues.     Migraines-   She has a history of migraines for which a been controlled again.  When she was sick she stopped all of her medications and us when she started back on the propanolol had 1 migraine but otherwise doing well.  Does not want to go back on Topamax.    Weight gain-wt down 5lbs now. Back to baseline.  Finds that the whole tablet phentermine does help.  Just has not yet engaged with regular exercise.  Needs accountability partner    Not swimming yet. Interested in doing so though for joints, weight loss, and mood.     She does use the Claritin and Singulair and flonase. Improved.     She normally sees Dr. Middleton for her gynecological visits.          Lab Results   Component Value Date    WBC 5.76 03/09/2018    HGB 13.1 03/09/2018    HCT 41.0 03/09/2018     03/09/2018    CHOL 253 (H) 03/09/2018    TRIG 66 03/09/2018    HDL 77 (H) 03/09/2018    ALT 15 03/09/2018    AST 18 03/09/2018     03/09/2018    K 4.0 03/09/2018     03/09/2018    CREATININE 1.0 03/09/2018    BUN 17 03/09/2018    CO2 25 03/09/2018    TSH 2.661 03/09/2018       Review of Systems   Constitutional: Positive for appetite change. Negative for activity change, fatigue and unexpected weight change.        Intentional weight loss   HENT: Positive for postnasal drip and rhinorrhea. Negative for congestion, sinus pain, sinus pressure, trouble swallowing and voice change.    Respiratory: Negative for cough and shortness of breath.    Cardiovascular: Negative for chest pain, palpitations and leg swelling.   Neurological: Positive for headaches. Negative for light-headedness.   Psychiatric/Behavioral: Negative  "for decreased concentration, dysphoric mood and sleep disturbance. The patient is not nervous/anxious.      Objective:     Vitals:    07/17/18 0717   BP: 110/84   BP Location: Left arm   Patient Position: Sitting   BP Method: Large (Manual)   Pulse: 68   Temp: 97.9 °F (36.6 °C)   TempSrc: Tympanic   Weight: 76.3 kg (168 lb 3.4 oz)   Height: 5' 1" (1.549 m)     Physical Exam   Constitutional: She appears well-developed and well-nourished.   HENT:   Head: Normocephalic and atraumatic.   Right Ear: Tympanic membrane normal.   Left Ear: Tympanic membrane normal.   Mouth/Throat: Oropharynx is clear and moist.   Eyes: Conjunctivae and EOM are normal.   Neck: Normal range of motion. Neck supple.   Cardiovascular: Normal rate and regular rhythm.    Pulmonary/Chest: Effort normal and breath sounds normal.   Psychiatric: She has a normal mood and affect. Her behavior is normal.   Nursing note and vitals reviewed.    Assessment:     1. Elevated BP without diagnosis of hypertension    2. Class 1 obesity without serious comorbidity with body mass index (BMI) of 31.0 to 31.9 in adult, unspecified obesity type    3. Seasonal allergic rhinitis, unspecified trigger    4. Chronic nonintractable headache, unspecified headache type      Plan:   Tova was seen today for weight check.    Diagnoses and all orders for this visit:    Elevated BP without diagnosis of hypertension-stable with use of propanolol mainly more for headaches but also controlling blood pressure in the setting of using a stimulant therapy.  Continue with weight loss    Class 1 obesity without serious comorbidity with body mass index (BMI) of 31.0 to 31.9 in adult, unspecified obesity type  Comments:  adipex 5lbs down, improving, consider trying to do every other day for now.     Seasonal allergic rhinitis, unspecified trigger  Comments:  cont wiht flonase and zyrtec, singulair.     Chronic nonintractable headache, unspecified headache type the-her resuming back on " propanolol continue with weight loss    Other orders  -     phentermine (ADIPEX-P) 37.5 mg tablet; 1/2- 1 tab po QAM            Follow-up in about 3 months (around 10/17/2018) for F/u WT, Labs, Meds Dr Lopez.

## 2018-08-17 ENCOUNTER — TELEPHONE (OUTPATIENT)
Dept: OBSTETRICS AND GYNECOLOGY | Facility: CLINIC | Age: 53
End: 2018-08-17

## 2018-08-17 DIAGNOSIS — Z12.39 BREAST CANCER SCREENING: Primary | ICD-10-CM

## 2018-09-22 ENCOUNTER — OFFICE VISIT (OUTPATIENT)
Dept: URGENT CARE | Facility: CLINIC | Age: 53
End: 2018-09-22
Payer: OTHER GOVERNMENT

## 2018-09-22 VITALS
HEART RATE: 59 BPM | WEIGHT: 174.63 LBS | OXYGEN SATURATION: 99 % | RESPIRATION RATE: 17 BRPM | TEMPERATURE: 98 F | DIASTOLIC BLOOD PRESSURE: 78 MMHG | SYSTOLIC BLOOD PRESSURE: 118 MMHG | HEIGHT: 61 IN | BODY MASS INDEX: 32.97 KG/M2
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
## 2018-09-22 DIAGNOSIS — S61.451A CAT BITE OF RIGHT HAND, INITIAL ENCOUNTER: Primary | ICD-10-CM

## 2018-09-22 PROCEDURE — 99213 OFFICE O/P EST LOW 20 MIN: CPT | Mod: PBBFAC,PO

## 2018-09-22 PROCEDURE — 99213 OFFICE O/P EST LOW 20 MIN: CPT | Mod: S$PBB,,,

## 2018-09-22 PROCEDURE — 99999 PR PBB SHADOW E&M-EST. PATIENT-LVL III: CPT | Mod: PBBFAC,,,

## 2018-09-22 RX ORDER — AMOXICILLIN AND CLAVULANATE POTASSIUM 875; 125 MG/1; MG/1
1 TABLET, FILM COATED ORAL EVERY 12 HOURS
Qty: 20 TABLET | Refills: 0 | Status: SHIPPED | OUTPATIENT
Start: 2018-09-22 | End: 2018-10-02

## 2018-09-22 NOTE — PATIENT INSTRUCTIONS
Cat Bite    A cat bite can cause a wound deep enough to break the skin. In such cases, the wound is cleaned and then sometimes closed. If the wound is closed it is usually not closed completely. This is so that fluid can drain if the wound becomes infected. Often the wound is left open to heal. In addition to wound care, a tetanus shot may be given, if needed.  Home care  · Wash your hands well with soap and warm water before and after caring for the wound. This helps lower the risk of infection.  · Care for the wound as directed. If a dressing was applied to the wound, be sure to change it as directed.  · If the wound bleeds, place a clean, soft cloth on the wound. Then firmly apply pressure until the bleeding stops. This may take up to 5 minutes. Do not release the pressure and look at the wound during this time.  · Always get medical attention for cat bites on the hand. They are highly likely to become infected.  · Most wounds heal within 10 days. But an infection can occur even with proper treatment. So be sure to check the wound daily for signs of infection (see below).  · Antibiotics may be prescribed. These help prevent or treat infection. If youre given antibiotics, take them as directed. Also be sure to complete the medicines.  Rabies prevention  Rabies is a virus that can be carried in certain animals. These can include domestic animals such as cats and dogs. Pets fully vaccinated against rabies (2 shots) are at very low risk of infection. But because human rabies is almost always fatal, any biting pet should be confined for 10 days as an extra precaution. In general, if there is a risk for rabies, the following steps may need to be taken:  · If someones pet cat has bitten you, it should be kept in a secure area for the next 10 days to watch for signs of illness. (If the pet owner wont allow this, contact your local animal control center.) If the cat becomes ill or dies during that time, contact your  local animal control center at once so the animal may be tested for rabies. If the cat stays healthy for the next 10 days, there is no danger of rabies in the animal or you.  · If a stray cat bit you, contact your local animal control center. They can give information on capture, quarantine, and animal rabies testing.  · If you cant find the animal that bit you in the next 2 days, and if rabies exists in your area, you may need to receive the rabies vaccine series. Call your healthcare provider right away. Or return to the emergency department promptly.  · All animal bites should be reported to the local animal control center. If you were not given a form to fill out, you can report this yourself.  Follow-up care  Follow up with your healthcare provider, or as directed.  When to seek medical advice  Call your healthcare provider right away if any of these occur:  · Signs of infection:  ¨ Spreading redness or warmth from the wound  ¨ Increased pain or swelling  ¨ Fever of 100.4ºF (38ºC) or higher, or as directed by your healthcare provider  ¨ Colored fluid or pus draining from the wound  ¨ Enlarged lymph nodes above the area that was bitten, such as lymph nodes in the armpit if you were bitten on the hand or arm. This may be a sign of cat-scratch disease (cat-scratch fever).  · Signs of rabies infection:  ¨ Headache  ¨ Confusion  ¨ Strange behavior  ¨ Increased salivating or drooling  ¨ Seizure  · Decreased ability to move any body part near the bite area  · Bleeding that can't be stopped after 5 minutes of firm pressure  Date Last Reviewed: 3/23/2015  © 6158-6333 [employer]. [address] [place] [address], [place] [address]. All rights reserved. This information is not intended as a substitute for professional medical care. Always follow your healthcare professional's instructions.        Cat Bite    A cat bite can cause a wound deep enough to break the skin. In such cases, the wound is cleaned and  then sometimes closed. If the wound is closed it is usually not closed completely. This is so that fluid can drain if the wound becomes infected. Often the wound is left open to heal. In addition to wound care, a tetanus shot may be given, if needed.  Home care  · Wash your hands well with soap and warm water before and after caring for the wound. This helps lower the risk of infection.  · Care for the wound as directed. If a dressing was applied to the wound, be sure to change it as directed.  · If the wound bleeds, place a clean, soft cloth on the wound. Then firmly apply pressure until the bleeding stops. This may take up to 5 minutes. Do not release the pressure and look at the wound during this time.  · Always get medical attention for cat bites on the hand. They are highly likely to become infected.  · Most wounds heal within 10 days. But an infection can occur even with proper treatment. So be sure to check the wound daily for signs of infection (see below).  · Antibiotics may be prescribed. These help prevent or treat infection. If youre given antibiotics, take them as directed. Also be sure to complete the medicines.  Rabies prevention  Rabies is a virus that can be carried in certain animals. These can include domestic animals such as cats and dogs. Pets fully vaccinated against rabies (2 shots) are at very low risk of infection. But because human rabies is almost always fatal, any biting pet should be confined for 10 days as an extra precaution. In general, if there is a risk for rabies, the following steps may need to be taken:  · If someones pet cat has bitten you, it should be kept in a secure area for the next 10 days to watch for signs of illness. (If the pet owner wont allow this, contact your local animal control center.) If the cat becomes ill or dies during that time, contact your local animal control center at once so the animal may be tested for rabies. If the cat stays healthy for the next  10 days, there is no danger of rabies in the animal or you.  · If a stray cat bit you, contact your local animal control center. They can give information on capture, quarantine, and animal rabies testing.  · If you cant find the animal that bit you in the next 2 days, and if rabies exists in your area, you may need to receive the rabies vaccine series. Call your healthcare provider right away. Or return to the emergency department promptly.  · All animal bites should be reported to the local animal control center. If you were not given a form to fill out, you can report this yourself.  Follow-up care  Follow up with your healthcare provider, or as directed.  When to seek medical advice  Call your healthcare provider right away if any of these occur:  · Signs of infection:  ¨ Spreading redness or warmth from the wound  ¨ Increased pain or swelling  ¨ Fever of 100.4ºF (38ºC) or higher, or as directed by your healthcare provider  ¨ Colored fluid or pus draining from the wound  ¨ Enlarged lymph nodes above the area that was bitten, such as lymph nodes in the armpit if you were bitten on the hand or arm. This may be a sign of cat-scratch disease (cat-scratch fever).  · Signs of rabies infection:  ¨ Headache  ¨ Confusion  ¨ Strange behavior  ¨ Increased salivating or drooling  ¨ Seizure  · Decreased ability to move any body part near the bite area  · Bleeding that can't be stopped after 5 minutes of firm pressure  Date Last Reviewed: 3/23/2015  © 3959-5804 [employer]. [address] [place] [address], [place] [address]. All rights reserved. This information is not intended as a substitute for professional medical care. Always follow your healthcare professional's instructions.

## 2018-09-22 NOTE — PROGRESS NOTES
Subjective:       Patient ID: Tova Muñoz is a 53 y.o. female      Chief Complaint:   Chief Complaint   Patient presents with    Animal Bite         Tova Muñoz presents to clinic stating she was bitten by her cat on yesterday. She states she came in today because she began to experience some mild tenderness on yesterday evening. She denies any fever. Tetanus was within the last 2 years. Cat is up to date on shots.       Animal Bite    The incident occurred yesterday. The incident occurred at home. There is an injury to the right hand. The pain is mild. It is unlikely that a foreign body is present. Pertinent negatives include no chest pain, no abdominal pain, no nausea, no vomiting, no headaches and no cough. Her tetanus status is UTD. She has been behaving normally. Services received include medications given.         Review of Systems   Constitutional: Negative.  Negative for fever and malaise/fatigue.   HENT: Negative.  Negative for congestion, ear pain and sore throat.    Respiratory: Negative.  Negative for cough, shortness of breath and wheezing.    Cardiovascular: Negative for chest pain, palpitations and leg swelling.   Gastrointestinal: Negative for abdominal pain, diarrhea, nausea and vomiting.   Musculoskeletal: Negative.  Negative for myalgias.   Skin: Negative.         +puncture wound secondary to cat bite   Neurological: Negative for dizziness and headaches.   Psychiatric/Behavioral: Negative.    All other systems reviewed and are negative.      Physical Exam   Constitutional: She is oriented to person, place, and time. She appears well-developed and well-nourished. No distress.   Cardiovascular: Normal rate, regular rhythm and normal heart sounds.   No murmur heard.  Pulmonary/Chest: Effort normal and breath sounds normal. No respiratory distress. She has no wheezes.   Musculoskeletal: Normal range of motion.   Neurological: She is alert and oriented to person, place, and time.   Skin: Skin  is warm and dry. There is erythema.        Psychiatric: She has a normal mood and affect. Her behavior is normal.       1. Cat bite of right hand, initial encounter        Tova was seen today for animal bite.    Diagnoses and all orders for this visit:    Cat bite of right hand, initial encounter  -     amoxicillin-clavulanate 875-125mg (AUGMENTIN) 875-125 mg per tablet; Take 1 tablet by mouth every 12 (twelve) hours. for 10 days        Follow-up if symptoms worsen or fail to improve.

## 2018-10-01 RX ORDER — PROGESTERONE 100 MG/1
100 CAPSULE ORAL NIGHTLY
Qty: 30 CAPSULE | Refills: 0 | Status: SHIPPED | OUTPATIENT
Start: 2018-10-01 | End: 2018-10-04 | Stop reason: SDUPTHER

## 2018-10-04 ENCOUNTER — OFFICE VISIT (OUTPATIENT)
Dept: OBSTETRICS AND GYNECOLOGY | Facility: CLINIC | Age: 53
End: 2018-10-04
Payer: OTHER GOVERNMENT

## 2018-10-04 ENCOUNTER — HOSPITAL ENCOUNTER (OUTPATIENT)
Dept: RADIOLOGY | Facility: HOSPITAL | Age: 53
Discharge: HOME | End: 2018-10-04
Attending: OBSTETRICS & GYNECOLOGY
Payer: OTHER GOVERNMENT

## 2018-10-04 VITALS
SYSTOLIC BLOOD PRESSURE: 112 MMHG | HEIGHT: 61 IN | BODY MASS INDEX: 33.17 KG/M2 | DIASTOLIC BLOOD PRESSURE: 80 MMHG | WEIGHT: 175.69 LBS

## 2018-10-04 DIAGNOSIS — Z12.39 BREAST CANCER SCREENING: ICD-10-CM

## 2018-10-04 DIAGNOSIS — Z01.419 WELL WOMAN EXAM WITH ROUTINE GYNECOLOGICAL EXAM: Primary | ICD-10-CM

## 2018-10-04 DIAGNOSIS — N95.1 HOT FLASHES DUE TO MENOPAUSE: ICD-10-CM

## 2018-10-04 PROCEDURE — 99212 OFFICE O/P EST SF 10 MIN: CPT | Mod: PBBFAC | Performed by: OBSTETRICS & GYNECOLOGY

## 2018-10-04 PROCEDURE — 77067 SCR MAMMO BI INCL CAD: CPT | Mod: 26,,, | Performed by: RADIOLOGY

## 2018-10-04 PROCEDURE — 99999 PR PBB SHADOW E&M-EST. PATIENT-LVL II: CPT | Mod: PBBFAC,,, | Performed by: OBSTETRICS & GYNECOLOGY

## 2018-10-04 PROCEDURE — 77067 SCR MAMMO BI INCL CAD: CPT | Mod: TC

## 2018-10-04 PROCEDURE — 99396 PREV VISIT EST AGE 40-64: CPT | Mod: S$PBB,,, | Performed by: OBSTETRICS & GYNECOLOGY

## 2018-10-04 RX ORDER — PROGESTERONE 100 MG/1
100 CAPSULE ORAL NIGHTLY
Qty: 30 CAPSULE | Refills: 11 | Status: SHIPPED | OUTPATIENT
Start: 2018-10-04 | End: 2019-10-29 | Stop reason: SDUPTHER

## 2018-10-06 NOTE — PROGRESS NOTES
HPI:  53 y.o. female patient  presents today for annual exam.  No complaints.  She is taking prometrium for menopausal symptoms, which is working well for her.  When she stops taking it, symptoms resume - hot flashes and irritability.  She had no menses for 6 months, starting in January, but then had a period in August and September.  Menses are normal in duration and flow.  No pelvic pain.      Past Medical History:   Diagnosis Date    Allergy     BCC (basal cell carcinoma of skin)     nose    Migraine headache     Sinusitis        Past Surgical History:   Procedure Laterality Date    BASAL CELL CARCINOMA EXCISION  2015    breast augumentation      BREAST SURGERY Bilateral 1989    saline implants     SECTION      x2    COLONOSCOPY N/A 2016    Procedure: COLONOSCOPY;  Surgeon: Dali Rodriguez MD;  Location: [facility];  Service: Endoscopy;  Laterality: N/A;    COLONOSCOPY N/A 2016    Performed by Dali Rodriguez MD at [facility]    LASIX      SKIN BIOPSY      TUBAL LIGATION         REVIEW OF SYSTEMS:  GENERAL:  No fever, chills, fatigue, or weight loss  ABDOMEN:  Normal appetite, no weight loss or abdominal pain  URINARY:  No flank pain, dysuria, or hematuria  REPRODUCTIVE:  No abnormal vaginal bleeding  BREASTS:  No tenderness, masses, or nipple discharge noted of breasts    PHYSICAL EXAM:    APPEARANCE:  Well nourished, well developed, in no acute distress  NECK:  Symmetric without masses or thyromegaly  BREASTS:  Symmetrical, no skin changes or visible lesions.  No palpable masses, nipple discharge, or adenopathy bilaterally.  ABDOMEN:  Soft, no tenderness or masses, no distension noted  PELVIC:  VULVA:  No lesions.  Normal female genitalia  URETHRAL MEATUS:  Normal size and location.  No lesions.  No prolapse  URETHRA:  No masses, tenderness, prolapse, or scarring  VAGINA:  No lesions or discharge.  No significant cystocele or rectocele  CERVIX:  No lesions.  Normal diameter, no  cervical motion tenderness.  UTERUS:  4-6 week size, regular shape, mobile, non-tender, normal position, good support  ADNEXA:  No masses or tenderness  ANUS AND PERINEUM:  No lesions.  No external hemorrhoids    1. Well woman exam with routine gynecological exam     2. Hot flashes due to menopause           PLAN:  MMG ordered.  Prometrium Rx renewed.  Patient counseled regarding recommended routine health maintenance for her age.

## 2018-10-24 ENCOUNTER — OFFICE VISIT (OUTPATIENT)
Dept: INTERNAL MEDICINE | Facility: CLINIC | Age: 53
End: 2018-10-24
Payer: OTHER GOVERNMENT

## 2018-10-24 VITALS
WEIGHT: 177.5 LBS | BODY MASS INDEX: 33.51 KG/M2 | TEMPERATURE: 98 F | HEIGHT: 61 IN | DIASTOLIC BLOOD PRESSURE: 72 MMHG | HEART RATE: 76 BPM | SYSTOLIC BLOOD PRESSURE: 122 MMHG

## 2018-10-24 DIAGNOSIS — E66.9 CLASS 1 OBESITY WITHOUT SERIOUS COMORBIDITY WITH BODY MASS INDEX (BMI) OF 33.0 TO 33.9 IN ADULT, UNSPECIFIED OBESITY TYPE: ICD-10-CM

## 2018-10-24 DIAGNOSIS — R51.9 CHRONIC NONINTRACTABLE HEADACHE, UNSPECIFIED HEADACHE TYPE: Primary | ICD-10-CM

## 2018-10-24 DIAGNOSIS — G89.29 CHRONIC NONINTRACTABLE HEADACHE, UNSPECIFIED HEADACHE TYPE: Primary | ICD-10-CM

## 2018-10-24 DIAGNOSIS — J30.2 SEASONAL ALLERGIES: Chronic | ICD-10-CM

## 2018-10-24 PROCEDURE — 99999 PR PBB SHADOW E&M-EST. PATIENT-LVL III: CPT | Mod: PBBFAC,,, | Performed by: FAMILY MEDICINE

## 2018-10-24 PROCEDURE — 99214 OFFICE O/P EST MOD 30 MIN: CPT | Mod: S$PBB,,, | Performed by: FAMILY MEDICINE

## 2018-10-24 PROCEDURE — 99213 OFFICE O/P EST LOW 20 MIN: CPT | Mod: PBBFAC,PO | Performed by: FAMILY MEDICINE

## 2018-10-24 RX ORDER — SUMATRIPTAN SUCCINATE AND NAPROXEN SODIUM 85; 500 MG/1; MG/1
1 TABLET, FILM COATED ORAL CONTINUOUS PRN
COMMUNITY
Start: 2018-10-09

## 2018-10-24 RX ORDER — PHENTERMINE HYDROCHLORIDE 37.5 MG/1
37.5 TABLET ORAL
Qty: 30 TABLET | Refills: 0 | Status: SHIPPED | OUTPATIENT
Start: 2018-10-24 | End: 2018-12-01 | Stop reason: SDUPTHER

## 2018-10-24 NOTE — PROGRESS NOTES
Subjective:      Patient ID: Tova Muñoz is a 53 y.o. female.    Chief Complaint: Weight Check    Disclaimer:  This note is prepared using voice recognition software and as such is likely to have errors and has not been proof read. Please contact me for questions.     Patient is coming in today for f/u of chronic issues.     Weight back up 9 lbs. Off all meds. Working 10 hr days. No exercise currently. Sitting at a desk all day long. Just tired. Working through lunch and 7-6 when her hrs are supposed to be 8-5. Stopped the adipex. Wanting to restart for another month. [facility] from her work.  Finds that she is more of a stress eater    Migraines-   She has a history of migraines for which a been controlled again.    Does not want to go back on Topamax.      Weight gain-still just does not feel is motivated.  Was going to meet with a friend but has not found the time.  Works 10 hr per day.  Not swimming yet. Interested in doing so though for joints, weight loss, and mood.     She does use the Claritin and Singulair and flonase. Improved.     She normally sees Dr. Middleton for her gynecological visits.  Did recently complete.        Lab Results   Component Value Date    WBC 5.76 03/09/2018    HGB 13.1 03/09/2018    HCT 41.0 03/09/2018     03/09/2018    CHOL 253 (H) 03/09/2018    TRIG 66 03/09/2018    HDL 77 (H) 03/09/2018    ALT 15 03/09/2018    AST 18 03/09/2018     03/09/2018    K 4.0 03/09/2018     03/09/2018    CREATININE 1.0 03/09/2018    BUN 17 03/09/2018    CO2 25 03/09/2018    TSH 2.661 03/09/2018       Mammo Digital Screening Bilat With CAD  Result:  Mammo Digital Screening Bilat With CAD    History:  Patient is 53 y.o. and is seen for a screening mammogram.    Films Compared:  Compared to: 09/06/2017 Mammo Digital Screening Bilat with CAD, 07/14/2016   Mammo Digital Screening Bilat with CAD, 07/08/2015 Mammo Digital Screening   Bilat with CAD and  "11/25/2013 Mammo Digital Screening Bilat with CAD    Findings:  Computer-aided detection was utilized in the interpretation of this   examination.       The breasts are heterogeneously dense, which may obscure small masses.   There is no evidence of suspicious masses, microcalcifications or   architectural distortion.    Impression:  No mammographic evidence of malignancy.    BI-RADS Category 1: Negative    Recommendation:  Routine screening mammogram in 1 year is recommended.    The patient's estimated lifetime risk of breast cancer (to age 85) based   on Tyrer-Cuzick - 7 risk assessment model is: Tyrer-Cuzick: 9.94 %.   According to the American Cancer Society,  patients with a lifetime breast   cancer risk of 20% or higher might benefit from supplemental screening   tests.        Review of Systems   Constitutional: Positive for activity change, appetite change, fatigue and unexpected weight change.   Respiratory: Negative for cough and shortness of breath.    Cardiovascular: Negative for chest pain and palpitations.   Gastrointestinal: Negative for abdominal distention and abdominal pain.   Psychiatric/Behavioral: Positive for decreased concentration and dysphoric mood.     Objective:     Vitals:    10/24/18 0717   BP: 122/72   Pulse: 76   Temp: 98 °F (36.7 °C)   Weight: 80.5 kg (177 lb 7.5 oz)   Height: 5' 1" (1.549 m)     Physical Exam   Constitutional: She appears well-developed and well-nourished.   HENT:   Head: Normocephalic and atraumatic.   Right Ear: Tympanic membrane normal.   Left Ear: Tympanic membrane normal.   Mouth/Throat: Oropharynx is clear and moist.   Eyes: Conjunctivae and EOM are normal.   Neck: Normal range of motion. Neck supple.   Cardiovascular: Normal rate and regular rhythm.   Pulmonary/Chest: Effort normal and breath sounds normal.   Psychiatric: She has a normal mood and affect. Her behavior is normal.   Nursing note and vitals reviewed.    Assessment:     1. Chronic nonintractable " headache, unspecified headache type    2. Class 1 obesity without serious comorbidity with body mass index (BMI) of 33.0 to 33.9 in adult, unspecified obesity type    3. Seasonal allergies      Plan:   Tova was seen today for weight check.    Diagnoses and all orders for this visit:    Chronic nonintractable headache, unspecified headache type-begin to start exercising in the morning or mid day around 15-20 minutes.  Discussed finding accountability partner.  Discussed setting up a reasonable goal.  She will e-mail me in 4-5 days to set up her game plan for the next month.  She will follow up with my nurse practitioner in 1 month.    Class 1 obesity without serious comorbidity with body mass index (BMI) of 33.0 to 33.9 in adult, unspecified obesity type-restarting Adipex discussed setting up reasonable goals for exercise and dietary changes.    Seasonal allergies-continue with Singulair and Flonase.    Other orders  -     phentermine (ADIPEX-P) 37.5 mg tablet; Take 1 tablet (37.5 mg total) by mouth before breakfast.            Follow-up in about 4 weeks (around 11/21/2018) for F/u keyur alejandro.    Patient Instructions   Setup an exercise goal that is realistic either before work , during lunch, or after for 2-3 times a week to get moving again. email me.

## 2018-10-24 NOTE — PATIENT INSTRUCTIONS
Setup an exercise goal that is realistic either before work , during lunch, or after for 2-3 times a week to get moving again. email me.

## 2018-10-28 ENCOUNTER — PATIENT MESSAGE (OUTPATIENT)
Dept: INTERNAL MEDICINE | Facility: CLINIC | Age: 53
End: 2018-10-28

## 2018-11-21 ENCOUNTER — OFFICE VISIT (OUTPATIENT)
Dept: INTERNAL MEDICINE | Facility: CLINIC | Age: 53
End: 2018-11-21
Payer: OTHER GOVERNMENT

## 2018-11-21 VITALS
TEMPERATURE: 97 F | HEART RATE: 64 BPM | RESPIRATION RATE: 16 BRPM | BODY MASS INDEX: 32.97 KG/M2 | HEIGHT: 61 IN | DIASTOLIC BLOOD PRESSURE: 72 MMHG | WEIGHT: 174.63 LBS | SYSTOLIC BLOOD PRESSURE: 114 MMHG

## 2018-11-21 DIAGNOSIS — G47.9 SLEEP DISTURBANCE: ICD-10-CM

## 2018-11-21 DIAGNOSIS — K59.00 CONSTIPATION, UNSPECIFIED CONSTIPATION TYPE: ICD-10-CM

## 2018-11-21 DIAGNOSIS — E66.9 CLASS 1 OBESITY WITHOUT SERIOUS COMORBIDITY WITH BODY MASS INDEX (BMI) OF 31.0 TO 31.9 IN ADULT, UNSPECIFIED OBESITY TYPE: Primary | ICD-10-CM

## 2018-11-21 PROCEDURE — 99999 PR PBB SHADOW E&M-EST. PATIENT-LVL III: CPT | Mod: PBBFAC,,, | Performed by: NURSE PRACTITIONER

## 2018-11-21 PROCEDURE — 99213 OFFICE O/P EST LOW 20 MIN: CPT | Mod: S$PBB,,, | Performed by: NURSE PRACTITIONER

## 2018-11-21 PROCEDURE — 99213 OFFICE O/P EST LOW 20 MIN: CPT | Mod: PBBFAC,PO | Performed by: NURSE PRACTITIONER

## 2018-11-21 NOTE — PATIENT INSTRUCTIONS
80 ounces of water a day, focused chugging sessions  No carbs at night  Melatonin 10mg 30 minutes before bed

## 2018-11-21 NOTE — PROGRESS NOTES
"Subjective:       Patient ID: Tova Muñoz is a 53 y.o. female.    Chief Complaint: Follow-up    Patient comes in today for weight loss follow up. She has lost 3 pounds since last month. She is run/walking 2 days a week and [facility] workout rasheed 3 days a week. She is not drinking much water, maybe one bottle a day. She eats larger meals for breakfast and lunch and light dinners. She is also having issues falling and staying asleep. This is a chronic issue, not related to phentermine. She denies any palpitations, anxiety, tachycardia related to the medication. She feels a significant decrease in appetite and feeling the need to snack. Bowels moving every other day.        /72 (BP Location: Right arm, Patient Position: Sitting, BP Method: Medium (Automatic))   Pulse 64   Temp 97.1 °F (36.2 °C) (Tympanic)   Resp 16   Ht 5' 1" (1.549 m)   Wt 79.2 kg (174 lb 9.7 oz)   BMI 32.99 kg/m²     Review of Systems   Constitutional: Negative for activity change, appetite change, chills, diaphoresis, fatigue, fever and unexpected weight change.   HENT: Negative.    Eyes: Negative for visual disturbance.   Respiratory: Negative for apnea, cough, choking, chest tightness, shortness of breath and wheezing.    Cardiovascular: Negative for chest pain, palpitations and leg swelling.   Gastrointestinal: Positive for constipation. Negative for abdominal distention, abdominal pain, anal bleeding, blood in stool, diarrhea, nausea, rectal pain and vomiting.   Genitourinary: Negative for decreased urine volume, difficulty urinating, dyspareunia, dysuria, enuresis, flank pain, frequency, genital sores, hematuria, menstrual problem, pelvic pain, urgency, vaginal bleeding, vaginal discharge and vaginal pain.   Neurological: Negative.  Negative for dizziness, syncope, speech difficulty, light-headedness and headaches.   Psychiatric/Behavioral: Positive for sleep disturbance. Negative for agitation, " behavioral problems, confusion, decreased concentration, dysphoric mood, hallucinations, self-injury and suicidal ideas. The patient is not nervous/anxious and is not hyperactive.        Objective:      Physical Exam   Constitutional: She is oriented to person, place, and time. She appears well-developed and well-nourished. She is cooperative. No distress.   HENT:   Head: Normocephalic and atraumatic.   Eyes: Conjunctivae are normal. Right eye exhibits no discharge. Left eye exhibits no discharge.   Cardiovascular: Normal rate, regular rhythm and normal heart sounds.   No murmur heard.  Pulmonary/Chest: Effort normal and breath sounds normal. No respiratory distress. She has no wheezes. She has no rales. She exhibits no tenderness.   Abdominal: Soft. She exhibits no distension.   Musculoskeletal: Normal range of motion.   Neurological: She is alert and oriented to person, place, and time.   Skin: Skin is warm and dry. No rash noted. She is not diaphoretic.   Psychiatric: She has a normal mood and affect. Her behavior is normal. Judgment and thought content normal.   Nursing note and vitals reviewed.      Assessment:       1. Class 1 obesity without serious comorbidity with body mass index (BMI) of 31.0 to 31.9 in adult, unspecified obesity type    2. Constipation, unspecified constipation type    3. Sleep disturbance        Plan:       Tova was seen today for follow-up.    Diagnoses and all orders for this visit:    Class 1 obesity without serious comorbidity with body mass index (BMI) of 31.0 to 31.9 in adult, unspecified obesity type    Constipation, unspecified constipation type    Sleep disturbance      Patient Instructions   80 ounces of water a day, focused chugging sessions  No carbs at night  Melatonin 10mg 30 minutes before bed  follow up with me in 1 month

## 2018-12-04 RX ORDER — PHENTERMINE HYDROCHLORIDE 37.5 MG/1
37.5 TABLET ORAL
Qty: 30 TABLET | Refills: 0 | Status: SHIPPED | OUTPATIENT
Start: 2018-12-04 | End: 2019-01-05 | Stop reason: SDUPTHER

## 2018-12-21 ENCOUNTER — OFFICE VISIT (OUTPATIENT)
Dept: INTERNAL MEDICINE | Facility: CLINIC | Age: 53
End: 2018-12-21
Payer: OTHER GOVERNMENT

## 2018-12-21 VITALS
TEMPERATURE: 97 F | HEIGHT: 61 IN | DIASTOLIC BLOOD PRESSURE: 82 MMHG | SYSTOLIC BLOOD PRESSURE: 122 MMHG | WEIGHT: 173.31 LBS | HEART RATE: 60 BPM | BODY MASS INDEX: 32.72 KG/M2 | RESPIRATION RATE: 16 BRPM

## 2018-12-21 DIAGNOSIS — K59.00 CONSTIPATION, UNSPECIFIED CONSTIPATION TYPE: ICD-10-CM

## 2018-12-21 DIAGNOSIS — E66.9 CLASS 1 OBESITY WITHOUT SERIOUS COMORBIDITY WITH BODY MASS INDEX (BMI) OF 31.0 TO 31.9 IN ADULT, UNSPECIFIED OBESITY TYPE: Primary | ICD-10-CM

## 2018-12-21 DIAGNOSIS — G47.9 SLEEP DISTURBANCE: ICD-10-CM

## 2018-12-21 PROCEDURE — 99213 OFFICE O/P EST LOW 20 MIN: CPT | Mod: PBBFAC,PO | Performed by: NURSE PRACTITIONER

## 2018-12-21 PROCEDURE — 99999 PR PBB SHADOW E&M-EST. PATIENT-LVL III: CPT | Mod: PBBFAC,,, | Performed by: NURSE PRACTITIONER

## 2018-12-21 PROCEDURE — 99213 OFFICE O/P EST LOW 20 MIN: CPT | Mod: S$PBB,,, | Performed by: NURSE PRACTITIONER

## 2018-12-21 NOTE — PATIENT INSTRUCTIONS
80 ounces of water a day, focused chugging sessions  No carbs at night  Continue 5 days a week exercise

## 2018-12-21 NOTE — PROGRESS NOTES
"Subjective:       Patient ID: Tova Muñoz is a 53 y.o. female.    Chief Complaint: Follow-up    Patient comes in today for weight loss follow-up.  She has lost 1 lb in the last month according to our scale however, according to her home scale she has lost 5 lb.  She is wearing cold weather clothing today due to the recent cold front.  She is doing well.  She is drinking 3 bottles of water a day and trying to increase that.  No side effects from phentermine.  She denies palpitations, headache, feelings of panic.  Bowels are moving every other day.  She is still exercising at the gym 3 days a week and trying to do walk/run sessions at home.  Patient feels very strongly that the medication is helping curb her appetite.        /82 (BP Location: Left arm, Patient Position: Sitting, BP Method: Medium (Automatic))   Pulse 60   Temp 97.4 °F (36.3 °C) (Tympanic)   Resp 16   Ht 5' 1" (1.549 m)   Wt 78.6 kg (173 lb 4.5 oz)   BMI 32.74 kg/m²     Review of Systems   Constitutional: Negative for activity change, appetite change, chills, diaphoresis, fatigue, fever and unexpected weight change.   HENT: Negative.    Eyes: Negative for visual disturbance.   Respiratory: Negative for cough, shortness of breath and wheezing.    Cardiovascular: Negative for chest pain, palpitations and leg swelling.   Gastrointestinal: Negative for abdominal distention, abdominal pain, blood in stool, constipation, diarrhea, nausea and vomiting.   Genitourinary: Negative for decreased urine volume, difficulty urinating, dysuria, frequency, hematuria and urgency.   Neurological: Negative.  Negative for dizziness, syncope, speech difficulty, light-headedness and headaches.   Psychiatric/Behavioral: Negative for agitation, confusion and hallucinations. The patient is not nervous/anxious.        Objective:      Physical Exam   Constitutional: She is oriented to person, place, and time. She appears well-developed and well-nourished. She is " cooperative. No distress.   HENT:   Head: Normocephalic and atraumatic.   Eyes: Conjunctivae are normal. Right eye exhibits no discharge. Left eye exhibits no discharge.   Cardiovascular: Normal rate, regular rhythm and normal heart sounds.   No murmur heard.  Pulmonary/Chest: Effort normal and breath sounds normal. No respiratory distress. She has no wheezes. She has no rales. She exhibits no tenderness.   Abdominal: Soft. She exhibits no distension.   Musculoskeletal: Normal range of motion.   Neurological: She is alert and oriented to person, place, and time.   Skin: Skin is warm and dry. No rash noted. She is not diaphoretic.   Psychiatric: She has a normal mood and affect. Her behavior is normal. Judgment and thought content normal.   Nursing note and vitals reviewed.      Assessment:       1. Class 1 obesity without serious comorbidity with body mass index (BMI) of 31.0 to 31.9 in adult, unspecified obesity type    2. Constipation, unspecified constipation type    3. Sleep disturbance        Plan:       Tova was seen today for follow-up.    Diagnoses and all orders for this visit:    Class 1 obesity without serious comorbidity with body mass index (BMI) of 31.0 to 31.9 in adult, unspecified obesity type    Constipation, unspecified constipation type    Sleep disturbance      Patient Instructions   80 ounces of water a day, focused chugging sessions  No carbs at night  Continue 5 days a week exercise   Constipation is improving with increased water intake.  Bowels moving every other day  Down 1 lb on our scale, 5 lb on home scale in the last month.  Patient using melatonin as needed.  She sleeps well some nights.  Other night she wakes up and is not able to fall back asleep.  Sleep hygiene practices discussed  Follow up 1 month

## 2019-01-07 RX ORDER — PHENTERMINE HYDROCHLORIDE 37.5 MG/1
37.5 TABLET ORAL
Qty: 30 TABLET | Refills: 0 | Status: SHIPPED | OUTPATIENT
Start: 2019-01-07 | End: 2019-02-07 | Stop reason: SDUPTHER

## 2019-02-06 ENCOUNTER — OFFICE VISIT (OUTPATIENT)
Dept: INTERNAL MEDICINE | Facility: CLINIC | Age: 54
End: 2019-02-06
Payer: OTHER GOVERNMENT

## 2019-02-06 VITALS
DIASTOLIC BLOOD PRESSURE: 68 MMHG | SYSTOLIC BLOOD PRESSURE: 114 MMHG | BODY MASS INDEX: 32.76 KG/M2 | RESPIRATION RATE: 16 BRPM | TEMPERATURE: 97 F | HEIGHT: 61 IN | HEART RATE: 70 BPM | WEIGHT: 173.5 LBS

## 2019-02-06 DIAGNOSIS — K59.00 CONSTIPATION, UNSPECIFIED CONSTIPATION TYPE: ICD-10-CM

## 2019-02-06 DIAGNOSIS — G47.9 SLEEP DISTURBANCE: ICD-10-CM

## 2019-02-06 DIAGNOSIS — E66.9 CLASS 1 OBESITY WITHOUT SERIOUS COMORBIDITY WITH BODY MASS INDEX (BMI) OF 31.0 TO 31.9 IN ADULT, UNSPECIFIED OBESITY TYPE: Primary | ICD-10-CM

## 2019-02-06 PROCEDURE — 99214 OFFICE O/P EST MOD 30 MIN: CPT | Mod: PBBFAC,PO | Performed by: NURSE PRACTITIONER

## 2019-02-06 PROCEDURE — 99213 OFFICE O/P EST LOW 20 MIN: CPT | Mod: S$PBB,,, | Performed by: NURSE PRACTITIONER

## 2019-02-06 PROCEDURE — 99213 PR OFFICE/OUTPT VISIT, EST, LEVL III, 20-29 MIN: ICD-10-PCS | Mod: S$PBB,,, | Performed by: NURSE PRACTITIONER

## 2019-02-06 PROCEDURE — 99999 PR PBB SHADOW E&M-EST. PATIENT-LVL IV: ICD-10-PCS | Mod: PBBFAC,,, | Performed by: NURSE PRACTITIONER

## 2019-02-06 PROCEDURE — 99999 PR PBB SHADOW E&M-EST. PATIENT-LVL IV: CPT | Mod: PBBFAC,,, | Performed by: NURSE PRACTITIONER

## 2019-02-06 NOTE — PATIENT INSTRUCTIONS
miralax for constipation  Increase water intake  Get back to the gym  My fitness pal cindy  Follow up with me in 1 month

## 2019-02-06 NOTE — PROGRESS NOTES
"Subjective:       Patient ID: Tova Muñoz is a 54 y.o. female.    Chief Complaint: Follow-up    Phenteramine is helping decrease snacking and helping with energy levels. She still goes to the gym with her friend, has not been for the last 3 weeks due to her job, year end with w2 forms. Melatonin helping her go to sleep but she is not staying asleep. Sleeping about 5 hours a night.    Protein drink in AM, fruit mid morning, sandwich for lunch or meal replacement meal, and a light dinner. Not a fan of veggies, does not like salads.    Water intake can use some work.    Wt Readings from Last 10 Encounters:  02/06/19 : 78.7 kg (173 lb 8 oz)  12/21/18 : 78.6 kg (173 lb 4.5 oz)  11/21/18 : 79.2 kg (174 lb 9.7 oz)  10/24/18 : 80.5 kg (177 lb 7.5 oz)  10/04/18 : 79.7 kg (175 lb 11.3 oz)  09/22/18 : 79.2 kg (174 lb 9.7 oz)  07/17/18 : 76.3 kg (168 lb 3.4 oz)  06/11/18 : 78.7 kg (173 lb 8 oz)  03/13/18 : 76.3 kg (168 lb 3.4 oz)  11/07/17 : 79.1 kg (174 lb 6.1 oz)            /68 (BP Location: Left arm, Patient Position: Sitting, BP Method: Medium (Automatic))   Pulse 70   Temp 97.4 °F (36.3 °C) (Tympanic)   Resp 16   Ht 5' 1" (1.549 m)   Wt 78.7 kg (173 lb 8 oz)   BMI 32.78 kg/m²     Review of Systems   Constitutional: Negative for activity change, appetite change, chills, diaphoresis, fatigue, fever and unexpected weight change.   HENT: Negative.    Eyes: Negative for visual disturbance.   Respiratory: Negative for apnea, cough, choking, chest tightness, shortness of breath, wheezing and stridor.    Cardiovascular: Negative for chest pain, palpitations and leg swelling.   Gastrointestinal: Negative for abdominal distention, abdominal pain, blood in stool, constipation, diarrhea, nausea and vomiting.   Genitourinary: Negative for decreased urine volume, difficulty urinating, dysuria, frequency, hematuria and urgency.   Neurological: Negative.  Negative for dizziness, syncope, speech difficulty, light-headedness " and headaches.   Psychiatric/Behavioral: Positive for sleep disturbance. Negative for agitation, confusion and hallucinations. The patient is not nervous/anxious.        Objective:      Physical Exam   Constitutional: She is oriented to person, place, and time. She appears well-developed and well-nourished. She is cooperative. No distress.   HENT:   Head: Normocephalic and atraumatic.   Eyes: Conjunctivae are normal. Right eye exhibits no discharge. Left eye exhibits no discharge.   Cardiovascular: Normal rate, regular rhythm and normal heart sounds.   No murmur heard.  Pulmonary/Chest: Effort normal and breath sounds normal. No respiratory distress. She has no wheezes. She has no rales. She exhibits no tenderness.   Abdominal: Soft. She exhibits no distension.   Musculoskeletal: Normal range of motion.   Neurological: She is alert and oriented to person, place, and time.   Skin: Skin is warm and dry. No rash noted. She is not diaphoretic.   Psychiatric: She has a normal mood and affect. Her behavior is normal. Judgment and thought content normal.   Nursing note and vitals reviewed.      Assessment:       1. Class 1 obesity without serious comorbidity with body mass index (BMI) of 31.0 to 31.9 in adult, unspecified obesity type    2. Constipation, unspecified constipation type    3. Sleep disturbance        Plan:       Tova was seen today for follow-up.    Diagnoses and all orders for this visit:    Class 1 obesity without serious comorbidity with body mass index (BMI) of 31.0 to 31.9 in adult, unspecified obesity type    Constipation, unspecified constipation type    Sleep disturbance      Patient Instructions   miralax for constipation  Increase water intake  Get back to the gym  My fitness pal cindy  Follow up with me in 1 month    Due for annual in march with Dr. Lopez, will schedule.

## 2019-02-07 RX ORDER — PHENTERMINE HYDROCHLORIDE 37.5 MG/1
37.5 TABLET ORAL
Qty: 30 TABLET | Refills: 0 | Status: SHIPPED | OUTPATIENT
Start: 2019-02-07 | End: 2019-03-09

## 2019-03-06 ENCOUNTER — OFFICE VISIT (OUTPATIENT)
Dept: INTERNAL MEDICINE | Facility: CLINIC | Age: 54
End: 2019-03-06
Payer: OTHER GOVERNMENT

## 2019-03-06 VITALS
SYSTOLIC BLOOD PRESSURE: 116 MMHG | BODY MASS INDEX: 32.46 KG/M2 | TEMPERATURE: 98 F | WEIGHT: 171.94 LBS | HEIGHT: 61 IN | HEART RATE: 72 BPM | DIASTOLIC BLOOD PRESSURE: 80 MMHG

## 2019-03-06 DIAGNOSIS — G47.9 SLEEP DISTURBANCE: ICD-10-CM

## 2019-03-06 DIAGNOSIS — E66.9 CLASS 1 OBESITY WITHOUT SERIOUS COMORBIDITY WITH BODY MASS INDEX (BMI) OF 31.0 TO 31.9 IN ADULT, UNSPECIFIED OBESITY TYPE: Primary | ICD-10-CM

## 2019-03-06 DIAGNOSIS — K59.00 CONSTIPATION, UNSPECIFIED CONSTIPATION TYPE: ICD-10-CM

## 2019-03-06 DIAGNOSIS — Z29.9 PREVENTIVE MEASURE: ICD-10-CM

## 2019-03-06 PROCEDURE — 99213 OFFICE O/P EST LOW 20 MIN: CPT | Mod: S$PBB,,, | Performed by: NURSE PRACTITIONER

## 2019-03-06 PROCEDURE — 99213 PR OFFICE/OUTPT VISIT, EST, LEVL III, 20-29 MIN: ICD-10-PCS | Mod: S$PBB,,, | Performed by: NURSE PRACTITIONER

## 2019-03-06 PROCEDURE — 99999 PR PBB SHADOW E&M-EST. PATIENT-LVL III: ICD-10-PCS | Mod: PBBFAC,,, | Performed by: NURSE PRACTITIONER

## 2019-03-06 PROCEDURE — 99999 PR PBB SHADOW E&M-EST. PATIENT-LVL III: CPT | Mod: PBBFAC,,, | Performed by: NURSE PRACTITIONER

## 2019-03-06 PROCEDURE — 99213 OFFICE O/P EST LOW 20 MIN: CPT | Mod: PBBFAC,PO | Performed by: NURSE PRACTITIONER

## 2019-03-06 NOTE — PROGRESS NOTES
"Subjective:       Patient ID: Tova Muñoz is a 54 y.o. female.    Chief Complaint: No chief complaint on file.    Lost 6 pounds since the end of October with 4 fills of Adipex. Not exercising. Diet is ok but does have occasional treats. Needs more water. Constipation is improving with daily miralax in her coffee. Work is extremely busy so she does not have time to focus on the things she needs to do for weight loss. Sleep is a little better on melatonin but wants to try the sleepy time tea.    Seeing Dr. Lopez next month for annual.    Wt Readings from Last 6 Encounters:  03/06/19 : 78 kg (171 lb 15.3 oz)  02/06/19 : 78.7 kg (173 lb 8 oz)  12/21/18 : 78.6 kg (173 lb 4.5 oz)  11/21/18 : 79.2 kg (174 lb 9.7 oz)  10/24/18 : 80.5 kg (177 lb 7.5 oz)  10/04/18 : 79.7 kg (175 lb 11.3 oz)            /80   Pulse 72   Temp 97.6 °F (36.4 °C) (Tympanic)   Ht 5' 1" (1.549 m)   Wt 78 kg (171 lb 15.3 oz)   BMI 32.49 kg/m²     Review of Systems   Constitutional: Negative for activity change, appetite change, chills, diaphoresis, fatigue, fever and unexpected weight change.   HENT: Negative.    Eyes: Negative for visual disturbance.   Respiratory: Negative for apnea, cough, choking, chest tightness, shortness of breath, wheezing and stridor.    Cardiovascular: Negative for chest pain, palpitations and leg swelling.   Gastrointestinal: Positive for constipation (improving w miralax). Negative for abdominal distention, abdominal pain, anal bleeding, blood in stool, diarrhea, nausea, rectal pain and vomiting.   Genitourinary: Negative for decreased urine volume, difficulty urinating, dysuria, frequency, hematuria and urgency.   Neurological: Negative.  Negative for dizziness, syncope, speech difficulty, light-headedness and headaches.   Psychiatric/Behavioral: Positive for sleep disturbance. Negative for agitation, behavioral problems, confusion, decreased concentration, dysphoric mood, hallucinations, self-injury and " suicidal ideas. The patient is not nervous/anxious and is not hyperactive.        Objective:      Physical Exam   Constitutional: She is oriented to person, place, and time. She appears well-developed and well-nourished. She is cooperative. No distress.   HENT:   Head: Normocephalic and atraumatic.   Eyes: Conjunctivae are normal. Right eye exhibits no discharge. Left eye exhibits no discharge.   Cardiovascular: Normal rate, regular rhythm and normal heart sounds.   No murmur heard.  Pulmonary/Chest: Effort normal and breath sounds normal. No respiratory distress. She has no wheezes. She has no rales. She exhibits no tenderness.   Abdominal: Soft. She exhibits no distension.   Musculoskeletal: Normal range of motion.   Neurological: She is alert and oriented to person, place, and time.   Skin: Skin is warm and dry. No rash noted. She is not diaphoretic.   Psychiatric: She has a normal mood and affect. Her behavior is normal. Judgment and thought content normal.   Nursing note and vitals reviewed.      Assessment:       1. Class 1 obesity without serious comorbidity with body mass index (BMI) of 31.0 to 31.9 in adult, unspecified obesity type    2. Constipation, unspecified constipation type    3. Sleep disturbance    4. Preventive measure        Plan:       Diagnoses and all orders for this visit:    Class 1 obesity without serious comorbidity with body mass index (BMI) of 31.0 to 31.9 in adult, unspecified obesity type    Constipation, unspecified constipation type    Sleep disturbance    Preventive measure  -     CBC auto differential; Future  -     Comprehensive metabolic panel; Future  -     Hemoglobin A1c; Future  -     Lipid panel; Future  -     TSH; Future  -     INSULIN, RANDOM; Future  -     T4, free; Future    has 11 pills left of 4th bottle of adipex. Has lost 6 pounds in 4 months of medication. Will finish this bottle and stop. Will schedule fasting labs prior to her annual with Dr. Lopez.   Continue  melatonin, start sleepy time tea  Continue miralax daily (only using 1 teaspoon currently may need more).  Exercise, diet, lifestyle changes discussed in detail  See Dr. Lopez in April for annual.

## 2019-04-23 ENCOUNTER — LAB VISIT (OUTPATIENT)
Dept: LAB | Facility: HOSPITAL | Age: 54
End: 2019-04-23
Attending: NURSE PRACTITIONER
Payer: OTHER GOVERNMENT

## 2019-04-23 DIAGNOSIS — Z29.9 PREVENTIVE MEASURE: ICD-10-CM

## 2019-04-23 LAB
ALBUMIN SERPL BCP-MCNC: 3.8 G/DL (ref 3.5–5.2)
ALP SERPL-CCNC: 72 U/L (ref 55–135)
ALT SERPL W/O P-5'-P-CCNC: 25 U/L (ref 10–44)
ANION GAP SERPL CALC-SCNC: 9 MMOL/L (ref 8–16)
AST SERPL-CCNC: 21 U/L (ref 10–40)
BASOPHILS # BLD AUTO: 0.03 K/UL (ref 0–0.2)
BASOPHILS NFR BLD: 0.7 % (ref 0–1.9)
BILIRUB SERPL-MCNC: 0.4 MG/DL (ref 0.1–1)
BUN SERPL-MCNC: 19 MG/DL (ref 6–20)
CALCIUM SERPL-MCNC: 9.3 MG/DL (ref 8.7–10.5)
CHLORIDE SERPL-SCNC: 108 MMOL/L (ref 95–110)
CHOLEST SERPL-MCNC: 241 MG/DL (ref 120–199)
CHOLEST/HDLC SERPL: 3.7 {RATIO} (ref 2–5)
CO2 SERPL-SCNC: 24 MMOL/L (ref 23–29)
CREAT SERPL-MCNC: 0.9 MG/DL (ref 0.5–1.4)
DIFFERENTIAL METHOD: ABNORMAL
EOSINOPHIL # BLD AUTO: 0.1 K/UL (ref 0–0.5)
EOSINOPHIL NFR BLD: 2.6 % (ref 0–8)
ERYTHROCYTE [DISTWIDTH] IN BLOOD BY AUTOMATED COUNT: 13.2 % (ref 11.5–14.5)
EST. GFR  (AFRICAN AMERICAN): >60 ML/MIN/1.73 M^2
EST. GFR  (NON AFRICAN AMERICAN): >60 ML/MIN/1.73 M^2
ESTIMATED AVG GLUCOSE: 111 MG/DL (ref 68–131)
GLUCOSE SERPL-MCNC: 72 MG/DL (ref 70–110)
HBA1C MFR BLD HPLC: 5.5 % (ref 4–5.6)
HCT VFR BLD AUTO: 39.6 % (ref 37–48.5)
HDLC SERPL-MCNC: 65 MG/DL (ref 40–75)
HDLC SERPL: 27 % (ref 20–50)
HGB BLD-MCNC: 12.5 G/DL (ref 12–16)
IMM GRANULOCYTES # BLD AUTO: 0.01 K/UL (ref 0–0.04)
IMM GRANULOCYTES NFR BLD AUTO: 0.2 % (ref 0–0.5)
INSULIN COLLECTION INTERVAL: NORMAL
INSULIN SERPL-ACNC: 5.9 UU/ML
LDLC SERPL CALC-MCNC: 163 MG/DL (ref 63–159)
LYMPHOCYTES # BLD AUTO: 1.5 K/UL (ref 1–4.8)
LYMPHOCYTES NFR BLD: 33.1 % (ref 18–48)
MCH RBC QN AUTO: 31.5 PG (ref 27–31)
MCHC RBC AUTO-ENTMCNC: 31.6 G/DL (ref 32–36)
MCV RBC AUTO: 100 FL (ref 82–98)
MONOCYTES # BLD AUTO: 0.5 K/UL (ref 0.3–1)
MONOCYTES NFR BLD: 9.9 % (ref 4–15)
NEUTROPHILS # BLD AUTO: 2.4 K/UL (ref 1.8–7.7)
NEUTROPHILS NFR BLD: 53.5 % (ref 38–73)
NONHDLC SERPL-MCNC: 176 MG/DL
NRBC BLD-RTO: 0 /100 WBC
PLATELET # BLD AUTO: 245 K/UL (ref 150–350)
PMV BLD AUTO: 10 FL (ref 9.2–12.9)
POTASSIUM SERPL-SCNC: 4.8 MMOL/L (ref 3.5–5.1)
PROT SERPL-MCNC: 7.2 G/DL (ref 6–8.4)
RBC # BLD AUTO: 3.97 M/UL (ref 4–5.4)
SODIUM SERPL-SCNC: 141 MMOL/L (ref 136–145)
T4 FREE SERPL-MCNC: 1.06 NG/DL (ref 0.71–1.51)
TRIGL SERPL-MCNC: 65 MG/DL (ref 30–150)
TSH SERPL DL<=0.005 MIU/L-ACNC: 2.42 UIU/ML (ref 0.4–4)
WBC # BLD AUTO: 4.56 K/UL (ref 3.9–12.7)

## 2019-04-23 PROCEDURE — 83525 ASSAY OF INSULIN: CPT

## 2019-04-23 PROCEDURE — 84443 ASSAY THYROID STIM HORMONE: CPT

## 2019-04-23 PROCEDURE — 84439 ASSAY OF FREE THYROXINE: CPT

## 2019-04-23 PROCEDURE — 83036 HEMOGLOBIN GLYCOSYLATED A1C: CPT

## 2019-04-23 PROCEDURE — 85025 COMPLETE CBC W/AUTO DIFF WBC: CPT

## 2019-04-23 PROCEDURE — 80061 LIPID PANEL: CPT

## 2019-04-23 PROCEDURE — 80053 COMPREHEN METABOLIC PANEL: CPT

## 2019-04-23 PROCEDURE — 36415 COLL VENOUS BLD VENIPUNCTURE: CPT | Mod: PO

## 2019-04-29 ENCOUNTER — OFFICE VISIT (OUTPATIENT)
Dept: INTERNAL MEDICINE | Facility: CLINIC | Age: 54
End: 2019-04-29
Payer: OTHER GOVERNMENT

## 2019-04-29 VITALS
WEIGHT: 179.44 LBS | BODY MASS INDEX: 33.88 KG/M2 | SYSTOLIC BLOOD PRESSURE: 120 MMHG | DIASTOLIC BLOOD PRESSURE: 80 MMHG | TEMPERATURE: 97 F | HEIGHT: 61 IN | HEART RATE: 68 BPM

## 2019-04-29 DIAGNOSIS — G89.29 CHRONIC NONINTRACTABLE HEADACHE, UNSPECIFIED HEADACHE TYPE: ICD-10-CM

## 2019-04-29 DIAGNOSIS — R51.9 CHRONIC NONINTRACTABLE HEADACHE, UNSPECIFIED HEADACHE TYPE: ICD-10-CM

## 2019-04-29 DIAGNOSIS — N95.1 HOT FLASHES DUE TO MENOPAUSE: ICD-10-CM

## 2019-04-29 DIAGNOSIS — J30.2 SEASONAL ALLERGIES: Chronic | ICD-10-CM

## 2019-04-29 DIAGNOSIS — E66.9 CLASS 1 OBESITY WITHOUT SERIOUS COMORBIDITY WITH BODY MASS INDEX (BMI) OF 31.0 TO 31.9 IN ADULT, UNSPECIFIED OBESITY TYPE: ICD-10-CM

## 2019-04-29 DIAGNOSIS — Z00.00 ROUTINE GENERAL MEDICAL EXAMINATION AT A HEALTH CARE FACILITY: Primary | ICD-10-CM

## 2019-04-29 PROCEDURE — 99396 PREV VISIT EST AGE 40-64: CPT | Mod: S$PBB,,, | Performed by: FAMILY MEDICINE

## 2019-04-29 PROCEDURE — 99213 OFFICE O/P EST LOW 20 MIN: CPT | Mod: PBBFAC,PO | Performed by: FAMILY MEDICINE

## 2019-04-29 PROCEDURE — 99999 PR PBB SHADOW E&M-EST. PATIENT-LVL III: CPT | Mod: PBBFAC,,, | Performed by: FAMILY MEDICINE

## 2019-04-29 PROCEDURE — 99999 PR PBB SHADOW E&M-EST. PATIENT-LVL III: ICD-10-PCS | Mod: PBBFAC,,, | Performed by: FAMILY MEDICINE

## 2019-04-29 PROCEDURE — 99396 PR PREVENTIVE VISIT,EST,40-64: ICD-10-PCS | Mod: S$PBB,,, | Performed by: FAMILY MEDICINE

## 2019-04-29 NOTE — PROGRESS NOTES
Subjective:      Patient ID: Tova Muñoz is a 54 y.o. female.    Chief Complaint: Annual Exam    Disclaimer:  This note is prepared using voice recognition software and as such is likely to have errors and has not been proof read. Please contact me for questions.     Patient is coming in today for prevention exam as well as weight loss and chronic issues.  Gained 8 lbs since last visit in month. Just feels like a lot of stress at work. After stopped the medication and the more stressed at work desires to eat and binge on food. Is working 10-11 hrs a day. Was doing 12 hr days before but tried to reduce it.   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 She is operations managers to get things fixed.   Not going to go to the gym in the am. Too stressed out during the afternoon and tired to workout after.        Wt Readings from Last 7 Encounters:  4/29/2019: 81.4 kg (179lb)  03/06/19 : 78 kg (171 lb 15.3 oz)  02/06/19 : 78.7 kg (173 lb 8 oz)  12/21/18 : 78.6 kg (173 lb 4.5 oz)  11/21/18 : 79.2 kg (174 lb 9.7 oz)  10/24/18 : 80.5 kg (177 lb 7.5 oz)  10/04/18 : 79.7 kg (175 lb 11.3 oz)    The 10-year ASCVD risk score (Svetlana VERAS Jr., et al., 2013) is: 1.8%    Values used to calculate the score:      Age: 54 years      Sex: Female      Is Non- : No      Diabetic: No      Tobacco smoker: No      Systolic Blood Pressure: 120 mmHg      Is BP treated: No      HDL Cholesterol: 65 mg/dL      Total Cholesterol: 241 mg/dL        Lab Results   Component Value Date    WBC 4.56 04/23/2019    HGB 12.5 04/23/2019    HCT 39.6 04/23/2019     04/23/2019    CHOL 241 (H) 04/23/2019    TRIG 65 04/23/2019    HDL 65 04/23/2019    ALT 25 04/23/2019    AST 21 04/23/2019     04/23/2019    K 4.8 04/23/2019     04/23/2019    CREATININE 0.9 04/23/2019    BUN 19 04/23/2019    CO2 24 04/23/2019    TSH 2.415 04/23/2019    HGBA1C 5.5 04/23/2019       Mammo Digital Screening  Bilat With CAD  Result:  Mammo Digital Screening Bilat With CAD    History:  Patient is 53 y.o. and is seen for a screening mammogram.    Films Compared:  Compared to: 09/06/2017 Mammo Digital Screening Bilat with CAD, 07/14/2016   Mammo Digital Screening Bilat with CAD, 07/08/2015 Mammo Digital Screening   Bilat with CAD and 11/25/2013 Mammo Digital Screening Bilat with CAD    Findings:  Computer-aided detection was utilized in the interpretation of this   examination.       The breasts are heterogeneously dense, which may obscure small masses.   There is no evidence of suspicious masses, microcalcifications or   architectural distortion.    Impression:  No mammographic evidence of malignancy.    BI-RADS Category 1: Negative    Recommendation:  Routine screening mammogram in 1 year is recommended.    The patient's estimated lifetime risk of breast cancer (to age 85) based   on Tyrer-Cuzick - 7 risk assessment model is: Tyrer-Cuzick: 9.94 %.   According to the American Cancer Society,  patients with a lifetime breast   cancer risk of 20% or higher might benefit from supplemental screening   tests.        Review of Systems   Constitutional: Positive for activity change, appetite change and unexpected weight change.   HENT: Negative for hearing loss, rhinorrhea and trouble swallowing.    Eyes: Negative for discharge and visual disturbance.   Respiratory: Negative for chest tightness and wheezing.    Cardiovascular: Negative for chest pain and palpitations.   Gastrointestinal: Negative for blood in stool, constipation, diarrhea and vomiting.   Endocrine: Negative for polydipsia and polyuria.   Genitourinary: Negative for difficulty urinating, dysuria, hematuria and menstrual problem.   Musculoskeletal: Positive for arthralgias and joint swelling. Negative for neck pain.   Neurological: Positive for headaches. Negative for weakness.   Psychiatric/Behavioral: Positive for dysphoric mood and sleep disturbance. Negative  "for confusion. The patient is nervous/anxious.      Objective:     Vitals:    04/29/19 1447   BP: 120/80   Pulse: 68   Temp: 97 °F (36.1 °C)   Weight: 81.4 kg (179 lb 7.3 oz)   Height: 5' 1" (1.549 m)     Physical Exam   Constitutional: She is oriented to person, place, and time. She appears well-developed and well-nourished.   Obese white female   HENT:   Head: Normocephalic and atraumatic.   Right Ear: External ear normal.   Left Ear: External ear normal.   Mouth/Throat: Oropharynx is clear and moist.   Eyes: EOM are normal.   Neck: Normal range of motion. Neck supple. No thyromegaly present.   Cardiovascular: Normal rate and regular rhythm. Exam reveals no gallop and no friction rub.   No murmur heard.  Pulmonary/Chest: Effort normal. No respiratory distress. She has no wheezes. She has no rales.   Abdominal: Soft. Bowel sounds are normal. She exhibits no distension. There is no tenderness. There is no rebound.   Musculoskeletal: Normal range of motion. She exhibits no edema.   Lymphadenopathy:     She has no cervical adenopathy.   Neurological: She is alert and oriented to person, place, and time.   Skin: Skin is warm and dry. No rash noted.   Psychiatric: She has a normal mood and affect. Her behavior is normal. Judgment and thought content normal.   Vitals reviewed.    Assessment:     1. Routine general medical examination at a health care facility    2. Class 1 obesity without serious comorbidity with body mass index (BMI) of 31.0 to 31.9 in adult, unspecified obesity type    3. Hot flashes due to menopause    4. Chronic nonintractable headache, unspecified headache type    5. Seasonal allergies      Plan:   Tova was seen today for annual exam.    Diagnoses and all orders for this visit:    Routine general medical examination at a health care facility-reviewed labs today discussed health maintenance issues discussed in given name of the health  for the patient.  Discussed ways to tackle stress levels as " well as trying to get some exercise in at least 10-15 minutes in the morning before work.  At home.  Discussed strategies to help with stress reduction.    Class 1 obesity without serious comorbidity with body mass index (BMI) of 31.0 to 31.9 in adult, unspecified obesity type-at this time no issues with insulin resistance or prediabetes discussed possibly using health  as well as working with a program to come to help combat her weight loss further.  Declines wanting to Adipex at this time because each time she gets off it weight seems to come back up    Hot flashes due to menopause stable at this time    Chronic nonintractable headache, unspecified headache type stable at this time    Seasonal allergies stable at this time            Follow up in about 1 year (around 4/29/2020) for physical with Dr RAMSEY.    There are no Patient Instructions on file for this visit.

## 2019-09-09 RX ORDER — PROPRANOLOL HYDROCHLORIDE 60 MG/1
60 TABLET ORAL 3 TIMES DAILY
Qty: 90 TABLET | Refills: 11 | Status: SHIPPED | OUTPATIENT
Start: 2019-09-09 | End: 2019-09-17

## 2019-09-17 ENCOUNTER — OFFICE VISIT (OUTPATIENT)
Dept: INTERNAL MEDICINE | Facility: CLINIC | Age: 54
End: 2019-09-17
Payer: OTHER GOVERNMENT

## 2019-09-17 VITALS
TEMPERATURE: 98 F | SYSTOLIC BLOOD PRESSURE: 118 MMHG | HEIGHT: 61 IN | HEART RATE: 92 BPM | DIASTOLIC BLOOD PRESSURE: 82 MMHG | WEIGHT: 184.75 LBS | BODY MASS INDEX: 34.88 KG/M2

## 2019-09-17 DIAGNOSIS — J30.2 SEASONAL ALLERGIES: Primary | Chronic | ICD-10-CM

## 2019-09-17 DIAGNOSIS — J34.89 SINUS PRESSURE: ICD-10-CM

## 2019-09-17 PROCEDURE — 99213 PR OFFICE/OUTPT VISIT, EST, LEVL III, 20-29 MIN: ICD-10-PCS | Mod: S$PBB,,, | Performed by: PHYSICIAN ASSISTANT

## 2019-09-17 PROCEDURE — 96372 THER/PROPH/DIAG INJ SC/IM: CPT | Mod: PBBFAC,PO

## 2019-09-17 PROCEDURE — 99999 PR PBB SHADOW E&M-EST. PATIENT-LVL III: ICD-10-PCS | Mod: PBBFAC,,, | Performed by: PHYSICIAN ASSISTANT

## 2019-09-17 PROCEDURE — 99213 OFFICE O/P EST LOW 20 MIN: CPT | Mod: S$PBB,,, | Performed by: PHYSICIAN ASSISTANT

## 2019-09-17 PROCEDURE — 99213 OFFICE O/P EST LOW 20 MIN: CPT | Mod: PBBFAC,PO | Performed by: PHYSICIAN ASSISTANT

## 2019-09-17 PROCEDURE — 99999 PR PBB SHADOW E&M-EST. PATIENT-LVL III: CPT | Mod: PBBFAC,,, | Performed by: PHYSICIAN ASSISTANT

## 2019-09-17 RX ORDER — FEXOFENADINE HCL 60 MG
60 TABLET ORAL DAILY
COMMUNITY
End: 2020-10-06

## 2019-09-17 RX ORDER — TRIAMCINOLONE ACETONIDE 40 MG/ML
60 INJECTION, SUSPENSION INTRA-ARTICULAR; INTRAMUSCULAR
Status: COMPLETED | OUTPATIENT
Start: 2019-09-17 | End: 2019-09-17

## 2019-09-17 RX ADMIN — TRIAMCINOLONE ACETONIDE 60 MG: 400 INJECTION, SUSPENSION INTRA-ARTICULAR; INTRAMUSCULAR at 03:09

## 2019-09-17 NOTE — PROGRESS NOTES
Subjective:       Patient ID: Tova Muñoz is a 54 y.o. female.    Chief Complaint: Sinus Problem and URI    Sinusitis   Associated symptoms include congestion, headaches, a hoarse voice and sinus pressure. Pertinent negatives include no chills, diaphoresis, ear pain, neck pain, shortness of breath, sneezing, sore throat or swollen glands.     Started on vacation, had an allergy attack, sneezing, runny nose, coryza.  Has not improved  Not sure what prompted her allergies     Has tried antihistamines with no success  No fever, or signs of infection   Health Maintenance Due   Topic Date Due    Mammogram  10/04/2019       Past Medical History:   Diagnosis Date    Allergy     BCC (basal cell carcinoma of skin)     nose    Migraine headache     Sinusitis        Current Outpatient Medications   Medication Sig Dispense Refill    fexofenadine (ALLEGRA) 60 MG tablet Take 60 mg by mouth once daily.      fluticasone (FLONASE) 50 mcg/actuation nasal spray 2 sprays (100 mcg total) by Each Nare route once daily. 1 Bottle 11    montelukast (SINGULAIR) 10 mg tablet Take 1 tablet (10 mg total) by mouth every evening. 90 tablet 3    progesterone (PROMETRIUM) 100 MG capsule Take 1 capsule (100 mg total) by mouth nightly. 30 capsule 11    propranolol (INDERAL) 60 MG tablet Take 1 tablet (60 mg total) by mouth every evening. For prevention of migraines. 90 tablet 3    TREXIMET  mg Tab       cetirizine (ZYRTEC) 10 MG tablet Take 10 mg by mouth once daily.       Current Facility-Administered Medications   Medication Dose Route Frequency Provider Last Rate Last Dose    triamcinolone acetonide injection 60 mg  60 mg Intramuscular 1 time in Clinic/[facility]           Review of Systems   Constitutional: Negative for chills and diaphoresis.   HENT: Positive for congestion, hoarse voice and sinus pressure. Negative for ear pain, sneezing and sore throat.    Respiratory: Negative for shortness of breath.   "  Musculoskeletal: Negative for neck pain.   Neurological: Positive for headaches.       Objective:   /82   Pulse 92   Temp 97.8 °F (36.6 °C) (Oral)   Ht 5' 1" (1.549 m)   Wt 83.8 kg (184 lb 11.9 oz)   BMI 34.91 kg/m²      Physical Exam   Constitutional: She is oriented to person, place, and time. She appears well-developed and well-nourished. No distress.   HENT:   Head: Normocephalic and atraumatic.   Right Ear: Hearing, tympanic membrane, external ear and ear canal normal.   Left Ear: Hearing, tympanic membrane, external ear and ear canal normal.   Nose: Mucosal edema and rhinorrhea present. No sinus tenderness. Right sinus exhibits no maxillary sinus tenderness and no frontal sinus tenderness. Left sinus exhibits no maxillary sinus tenderness and no frontal sinus tenderness.   Mouth/Throat: Uvula is midline, oropharynx is clear and moist and mucous membranes are normal. No oropharyngeal exudate (post nasal drip), posterior oropharyngeal edema, posterior oropharyngeal erythema or tonsillar abscesses.   + post nasal drip in posterior pharynx      Eyes: Pupils are equal, round, and reactive to light. Conjunctivae are normal.   Neck: Normal range of motion. Neck supple.   Cardiovascular: Normal rate, regular rhythm and normal heart sounds.   Pulmonary/Chest: Effort normal and breath sounds normal. No respiratory distress.   Neurological: She is alert and oriented to person, place, and time.   Skin: Skin is warm.   Psychiatric: She has a normal mood and affect. Her behavior is normal. Judgment and thought content normal.   Vitals reviewed.        Lab Results   Component Value Date    WBC 4.56 04/23/2019    HGB 12.5 04/23/2019    HCT 39.6 04/23/2019     04/23/2019    CHOL 241 (H) 04/23/2019    TRIG 65 04/23/2019    HDL 65 04/23/2019    ALT 25 04/23/2019    AST 21 04/23/2019     04/23/2019    K 4.8 04/23/2019     04/23/2019    CREATININE 0.9 04/23/2019    BUN 19 04/23/2019    CO2 24 " 04/23/2019    TSH 2.415 04/23/2019    HGBA1C 5.5 04/23/2019       Assessment:       1. Seasonal allergies    2. Sinus pressure        Plan:   Seasonal allergies    Sinus pressure    Other orders  -     triamcinolone acetonide injection 60 mg    allergy attack  Steroid inj  Plenty of fluids  Follow up if no better in 48 hours       No follow-ups on file.

## 2019-10-29 RX ORDER — PROGESTERONE 100 MG/1
100 CAPSULE ORAL NIGHTLY
Qty: 30 CAPSULE | Refills: 1 | Status: SHIPPED | OUTPATIENT
Start: 2019-10-29 | End: 2019-12-07 | Stop reason: SDUPTHER

## 2019-11-01 ENCOUNTER — TELEPHONE (OUTPATIENT)
Dept: OBSTETRICS AND GYNECOLOGY | Facility: CLINIC | Age: 54
End: 2019-11-01

## 2019-11-01 DIAGNOSIS — Z12.39 BREAST CANCER SCREENING: Primary | ICD-10-CM

## 2019-11-01 NOTE — TELEPHONE ENCOUNTER
Patient scheduled for appointment with Dr Middleton on 12/06/19 at 3:20pm [facility] location and for mammogram on 11/29/2019 at 3pm [facility] location.  She voiced understanding of the time, date and locations.

## 2019-11-01 NOTE — TELEPHONE ENCOUNTER
----- Message from Navdeep Ibarra sent at 11/1/2019  4:03 PM CDT -----  Contact: pt   .Type:  Mammogram    Caller is requesting to schedule their annual mammogram appointment.  Order is not listed in EPIC.  Please enter order and contact patient to schedule.  Name of Caller: pt   Where would they like the mammogram performed? [email or web address]   Would the patient rather a call back or a response via [facility]? Callback   Best Call Back Number .. [phone]  Additional Information:

## 2019-11-29 ENCOUNTER — HOSPITAL ENCOUNTER (OUTPATIENT)
Dept: RADIOLOGY | Facility: HOSPITAL | Age: 54
Discharge: HOME | End: 2019-11-29
Attending: OBSTETRICS & GYNECOLOGY
Payer: OTHER GOVERNMENT

## 2019-11-29 DIAGNOSIS — Z12.39 BREAST CANCER SCREENING: ICD-10-CM

## 2019-11-29 PROCEDURE — 77063 BREAST TOMOSYNTHESIS BI: CPT | Mod: 26,,, | Performed by: RADIOLOGY

## 2019-11-29 PROCEDURE — 77067 SCR MAMMO BI INCL CAD: CPT | Mod: 26,,, | Performed by: RADIOLOGY

## 2019-11-29 PROCEDURE — 77067 MAMMO DIGITAL SCREENING BILAT WITH TOMOSYNTHESIS_CAD: ICD-10-PCS | Mod: 26,,, | Performed by: RADIOLOGY

## 2019-11-29 PROCEDURE — 77063 MAMMO DIGITAL SCREENING BILAT WITH TOMOSYNTHESIS_CAD: ICD-10-PCS | Mod: 26,,, | Performed by: RADIOLOGY

## 2019-11-29 PROCEDURE — 77067 SCR MAMMO BI INCL CAD: CPT | Mod: TC

## 2019-12-06 ENCOUNTER — OFFICE VISIT (OUTPATIENT)
Dept: OBSTETRICS AND GYNECOLOGY | Facility: CLINIC | Age: 54
End: 2019-12-06
Payer: OTHER GOVERNMENT

## 2019-12-06 VITALS — WEIGHT: 180 LBS | SYSTOLIC BLOOD PRESSURE: 108 MMHG | BODY MASS INDEX: 34.01 KG/M2 | DIASTOLIC BLOOD PRESSURE: 74 MMHG

## 2019-12-06 DIAGNOSIS — Z01.419 WELL WOMAN EXAM WITH ROUTINE GYNECOLOGICAL EXAM: Primary | ICD-10-CM

## 2019-12-06 PROCEDURE — 99396 PREV VISIT EST AGE 40-64: CPT | Mod: S$PBB,,, | Performed by: OBSTETRICS & GYNECOLOGY

## 2019-12-06 PROCEDURE — 99396 PR PREVENTIVE VISIT,EST,40-64: ICD-10-PCS | Mod: S$PBB,,, | Performed by: OBSTETRICS & GYNECOLOGY

## 2019-12-06 PROCEDURE — 99999 PR PBB SHADOW E&M-EST. PATIENT-LVL II: CPT | Mod: PBBFAC,,, | Performed by: OBSTETRICS & GYNECOLOGY

## 2019-12-06 PROCEDURE — 99212 OFFICE O/P EST SF 10 MIN: CPT | Mod: PBBFAC | Performed by: OBSTETRICS & GYNECOLOGY

## 2019-12-06 PROCEDURE — 99999 PR PBB SHADOW E&M-EST. PATIENT-LVL II: ICD-10-PCS | Mod: PBBFAC,,, | Performed by: OBSTETRICS & GYNECOLOGY

## 2019-12-06 RX ORDER — CITALOPRAM 10 MG/1
10 TABLET ORAL DAILY
Qty: 30 TABLET | Refills: 6 | Status: SHIPPED | OUTPATIENT
Start: 2019-12-06 | End: 2020-10-06 | Stop reason: SDUPTHER

## 2019-12-07 RX ORDER — PROGESTERONE 100 MG/1
100 CAPSULE ORAL NIGHTLY
Qty: 30 CAPSULE | Refills: 11 | Status: SHIPPED | OUTPATIENT
Start: 2019-12-07 | End: 2019-12-07

## 2019-12-08 NOTE — PROGRESS NOTES
HPI:  54 y.o. female patient  presents today for annual exam.  No complaints.  LMP 2018.  Was on prometrium for menopausal symptoms, but has now stopped taking it.      Past Medical History:   Diagnosis Date    Allergy     BCC (basal cell carcinoma of skin)     nose    Migraine headache     Sinusitis        Past Surgical History:   Procedure Laterality Date    AUGMENTATION OF BREAST          BASAL CELL CARCINOMA EXCISION  2015    breast augumentation      BREAST SURGERY Bilateral     saline implants     SECTION      x2    COLONOSCOPY N/A 2016    Procedure: COLONOSCOPY;  Surgeon: Dali Rodriguez MD;  Location: [facility];  Service: Endoscopy;  Laterality: N/A;    LASIX      SKIN BIOPSY      TUBAL LIGATION         REVIEW OF SYSTEMS:  GENERAL:  No fever, chills, fatigue, or weight loss  ABDOMEN:  Normal appetite, no weight loss or abdominal pain  URINARY:  No flank pain, dysuria, or hematuria  REPRODUCTIVE:  No abnormal vaginal bleeding  BREASTS:  No tenderness, masses, or nipple discharge noted of breasts    PHYSICAL EXAM:    APPEARANCE:  Well nourished, well developed, in no acute distress  NECK:  Symmetric without masses or thyromegaly  BREASTS:  Symmetrical, no skin changes or visible lesions.  No palpable masses, nipple discharge, or adenopathy bilaterally.  ABDOMEN:  Soft, no tenderness or masses, no distension noted  PELVIC:  VULVA:  No lesions.  Normal female genitalia  URETHRAL MEATUS:  Normal size and location.  No lesions.  No prolapse  URETHRA:  No masses, tenderness, prolapse, or scarring  VAGINA:  No lesions or discharge.  No significant cystocele or rectocele  CERVIX:  No lesions.  Normal diameter, no cervical motion tenderness.  UTERUS:  4-6 week size, regular shape, mobile, non-tender, normal position, good support  ADNEXA:  No masses or tenderness  ANUS AND PERINEUM:  No lesions.  No external hemorrhoids    1. Well woman exam with routine gynecological  exam           PLAN:  MMG annually.  Patient counseled regarding recommended routine health maintenance for her age.

## 2020-03-04 ENCOUNTER — PATIENT OUTREACH (OUTPATIENT)
Dept: ADMINISTRATIVE | Facility: OTHER | Age: 55
End: 2020-03-04

## 2020-03-05 ENCOUNTER — OFFICE VISIT (OUTPATIENT)
Dept: DERMATOLOGY | Facility: CLINIC | Age: 55
End: 2020-03-05
Payer: OTHER GOVERNMENT

## 2020-03-05 DIAGNOSIS — D22.9 MULTIPLE NEVI: ICD-10-CM

## 2020-03-05 DIAGNOSIS — L90.5 SCAR CONDITIONS/SKIN FIBROSIS: Primary | ICD-10-CM

## 2020-03-05 DIAGNOSIS — D48.5 NEOPLASM OF UNCERTAIN BEHAVIOR OF SKIN: ICD-10-CM

## 2020-03-05 DIAGNOSIS — L57.0 ACTINIC KERATOSES: ICD-10-CM

## 2020-03-05 DIAGNOSIS — L72.0 MILIA: ICD-10-CM

## 2020-03-05 DIAGNOSIS — Z85.828 HISTORY OF SKIN CANCER: ICD-10-CM

## 2020-03-05 DIAGNOSIS — Z12.83 SCREENING, MALIGNANT NEOPLASM, SKIN: ICD-10-CM

## 2020-03-05 PROCEDURE — 17000 PR DESTRUCTION(LASER SURGERY,CRYOSURGERY,CHEMOSURGERY),PREMALIGNANT LESIONS,FIRST LESION: ICD-10-PCS | Mod: 59,S$PBB,, | Performed by: DERMATOLOGY

## 2020-03-05 PROCEDURE — 11102 TANGNTL BX SKIN SINGLE LES: CPT | Mod: PBBFAC | Performed by: DERMATOLOGY

## 2020-03-05 PROCEDURE — 88305 TISSUE EXAM BY PATHOLOGIST: CPT | Performed by: PATHOLOGY

## 2020-03-05 PROCEDURE — 11102 TANGNTL BX SKIN SINGLE LES: CPT | Mod: S$PBB,,, | Performed by: DERMATOLOGY

## 2020-03-05 PROCEDURE — 99999 PR PBB SHADOW E&M-EST. PATIENT-LVL III: CPT | Mod: PBBFAC,,, | Performed by: DERMATOLOGY

## 2020-03-05 PROCEDURE — 99202 PR OFFICE/OUTPT VISIT, NEW, LEVL II, 15-29 MIN: ICD-10-PCS | Mod: 25,S$PBB,, | Performed by: DERMATOLOGY

## 2020-03-05 PROCEDURE — 99202 OFFICE O/P NEW SF 15 MIN: CPT | Mod: 25,S$PBB,, | Performed by: DERMATOLOGY

## 2020-03-05 PROCEDURE — 11102 PR TANGENTIAL BIOPSY, SKIN, SINGLE LESION: ICD-10-PCS | Mod: S$PBB,,, | Performed by: DERMATOLOGY

## 2020-03-05 PROCEDURE — 88305 TISSUE EXAM BY PATHOLOGIST: CPT | Mod: 26,,, | Performed by: PATHOLOGY

## 2020-03-05 PROCEDURE — 88305 TISSUE EXAM BY PATHOLOGIST: ICD-10-PCS | Mod: 26,,, | Performed by: PATHOLOGY

## 2020-03-05 PROCEDURE — 17000 DESTRUCT PREMALG LESION: CPT | Mod: 59,S$PBB,, | Performed by: DERMATOLOGY

## 2020-03-05 PROCEDURE — 99999 PR PBB SHADOW E&M-EST. PATIENT-LVL III: ICD-10-PCS | Mod: PBBFAC,,, | Performed by: DERMATOLOGY

## 2020-03-05 PROCEDURE — 99213 OFFICE O/P EST LOW 20 MIN: CPT | Mod: PBBFAC | Performed by: DERMATOLOGY

## 2020-03-05 PROCEDURE — 17000 DESTRUCT PREMALG LESION: CPT | Mod: 59,PBBFAC | Performed by: DERMATOLOGY

## 2020-03-05 NOTE — PROGRESS NOTES
Subjective:       Patient ID:  Tova Muñoz is a 55 y.o. female who presents for   Chief Complaint   Patient presents with    Follow-up    Spot     spot on face that continues to scab and peel (occasionally itchy)    Rash     white raised bumps on left chest/neck/shoulder area     Hx of NMSC of the left nasal ala and IDN of the right nasal ala, last seen on 2/15/17.  She c/o lesion of the right cheek x 1 year, + crusted. No tx tried.       Review of Systems   Constitutional: Negative for fever and chills.   Gastrointestinal: Negative for nausea and vomiting.   Skin: Positive for activity-related sunscreen use. Negative for daily sunscreen use and recent sunburn.   Hematologic/Lymphatic: Does not bruise/bleed easily.        Objective:    Physical Exam   Constitutional: She appears well-developed and well-nourished. No distress.   Neurological: She is alert and oriented to person, place, and time. She is not disoriented.   Psychiatric: She has a normal mood and affect.   Skin:   Areas Examined (abnormalities noted in diagram):   Head / Face Inspection Performed  Neck Inspection Performed  Chest / Axilla Inspection Performed  Abdomen Inspection Performed  Back Inspection Performed  RUE Inspected  LUE Inspection Performed  Nails and Digits Inspection Performed                   Diagram Legend     Erythematous scaling macule/papule c/w actinic keratosis       Vascular papule c/w angioma      Pigmented verrucoid papule/plaque c/w seborrheic keratosis      Yellow umbilicated papule c/w sebaceous hyperplasia      Irregularly shaped tan macule c/w lentigo     1-2 mm smooth white papules consistent with Milia      Movable subcutaneous cyst with punctum c/w epidermal inclusion cyst      Subcutaneous movable cyst c/w pilar cyst      Firm pink to brown papule c/w dermatofibroma      Pedunculated fleshy papule(s) c/w skin tag(s)      Evenly pigmented macule c/w junctional nevus     Mildly variegated pigmented, slightly  irregular-bordered macule c/w mildly atypical nevus      Flesh colored to evenly pigmented papule c/w intradermal nevus       Pink pearly papule/plaque c/w basal cell carcinoma      Erythematous hyperkeratotic cursted plaque c/w SCC      Surgical scar with no sign of skin cancer recurrence      Open and closed comedones      Inflammatory papules and pustules      Verrucoid papule consistent consistent with wart     Erythematous eczematous patches and plaques     Dystrophic onycholytic nail with subungual debris c/w onychomycosis     Umbilicated papule    Erythematous-base heme-crusted tan verrucoid plaque consistent with inflamed seborrheic keratosis     Erythematous Silvery Scaling Plaque c/w Psoriasis     See annotation                Assessment / Plan:      Pathology Orders:     Normal Orders This Visit    Specimen to Pathology, Dermatology     Questions:    Procedure Type:  Dermatology and skin neoplasms    Number of Specimens:  1    ------------------------:  -------------------------    Spec 1 Procedure:  Biopsy    Spec 1 Clinical Impression:  r/o BCC vs. excoriation    Spec 1 Source:  right mid-back        Scar conditions/skin fibrosis  Screening, malignant neoplasm, skin  History of skin cancer  Scar of the left nasal ala, hx of NMSC.  No evidence of recurrence on physical exam today.  Continue routine skin surveillance. Daily sunscreen advised.    Multiple nevi  Reassurance given.  Discussed ABCDEF of melanoma and changes for patient to look for.  AAD Handout given. Discussed importance of daily use of sunscreen which is broad-spectrum and has a minimum SPF of 30.    Arnolia  Recommend OTC scrubs, face and chest.    Actinic keratoses  Cryosurgery Procedure Note    The patient is informed of the precancerous quality and need for treatment of these lesions. After risks, benefits and alternatives explained, including blistering, pain, hyper- and hypopigmentation, patient verbally consents to cryotherapy to  precancerous lesions. Liquid nitrogen cryosurgery is applied to the 1 actinic keratoses, as detailed in the physical exam, to produce a freeze injury. The patient is aware that blisters may form and is instructed on wound care with gentle cleansing and use of vaseline ointment to keep moist until healed. The patient is supplied a handout on cryosurgery and is instructed to call if lesions do not completely resolve.      Neoplasm of uncertain behavior of skin  -     Shave biopsy(-ies) done of 1 site(s).   Patient informed to call for results within 2 weeks if have not received notification via telephone call or [facility]         Follow up for call for results.     PROCEDURE NOTE - SHAVE BIOPSY   Location: see above    After risk, benefits, and alternatives were discussed with the patient, the patient agrees to the procedure by verbal informed consent.  The area(s) were cleansed with alcohol. 2 cc of lidocaine 1% with epinephrine was injected for local anesthesia into each lesion(s).  A sharp dermablade was used to remove part or all of the lesion(s).  The specimen(s) will be sent for tissue pathology.  Hemostasis was obtained with aluminum chloride and/or hyfrecation.  The area(s) were dressed with vaseline ointment and bandaged.  The patient tolerated the procedure well without adverse events.  Wound care instructions were given to the patient on the AVS.  The patient will be notified of pathology results once available. Results will also be available in Epic.

## 2020-03-05 NOTE — PATIENT INSTRUCTIONS
Shave Biopsy Wound Care    Your doctor has performed a shave biopsy today.  A band aid and vaseline ointment has been placed over the site.  This should remain in place for 24 hours.  It is recommended that you keep the area dry for the first 24 hours.  After 24 hours, you may remove the band aid and wash the area with warm soap and water and apply Vaseline jelly.  Many patients prefer to use Neosporin or Bacitracin ointment.  This is acceptable; however, know that you can develop an allergy to this medication even if you have used it safely for years.  It is important to keep the area moist.  Letting it dry out and get air slows healing time, and will worsen the scar.  Band aid is optional after first 24 hours.      If you notice increasing redness, tenderness, pain, or yellow drainage at the biopsy site, please notify your doctor.  These are signs of an infection.    If your biopsy site is bleeding, apply firm pressure for 15 minutes straight.  Repeat for another 15 minutes, if it is still bleeding.   If the surgical site continues to bleed, then please contact your doctor.      [facility] - [facility]   DERMATOLOGY  [address]   [place] [address]   Dept: [phone]   Dept Fax: [phone]

## 2020-03-09 LAB
FINAL PATHOLOGIC DIAGNOSIS: NORMAL
GROSS: NORMAL
MICROSCOPIC EXAM: NORMAL

## 2020-05-07 ENCOUNTER — PROCEDURE VISIT (OUTPATIENT)
Dept: DERMATOLOGY | Facility: CLINIC | Age: 55
End: 2020-05-07
Payer: OTHER GOVERNMENT

## 2020-05-07 DIAGNOSIS — C44.519 BASAL CELL CARCINOMA OF TRUNK: Primary | ICD-10-CM

## 2020-05-07 PROCEDURE — 88305 TISSUE EXAM BY PATHOLOGIST: CPT | Performed by: PATHOLOGY

## 2020-05-07 PROCEDURE — 12032 INTMD RPR S/A/T/EXT 2.6-7.5: CPT | Mod: S$PBB,,, | Performed by: DERMATOLOGY

## 2020-05-07 PROCEDURE — 11603 PR EXC SKIN MALIG 2.1-3 CM TRUNK,ARM,LEG: ICD-10-PCS | Mod: S$PBB,51,, | Performed by: DERMATOLOGY

## 2020-05-07 PROCEDURE — 11603 EXC TR-EXT MAL+MARG 2.1-3 CM: CPT | Mod: PBBFAC | Performed by: DERMATOLOGY

## 2020-05-07 PROCEDURE — 12032 PR LAYR CLOS WND TRUNK,ARM,LEG 2.6-7.5 CM: ICD-10-PCS | Mod: S$PBB,,, | Performed by: DERMATOLOGY

## 2020-05-07 PROCEDURE — 99499 UNLISTED E&M SERVICE: CPT | Mod: S$PBB,,, | Performed by: DERMATOLOGY

## 2020-05-07 PROCEDURE — 99499 NO LOS: ICD-10-PCS | Mod: S$PBB,,, | Performed by: DERMATOLOGY

## 2020-05-07 PROCEDURE — 12032 INTMD RPR S/A/T/EXT 2.6-7.5: CPT | Mod: PBBFAC | Performed by: DERMATOLOGY

## 2020-05-07 PROCEDURE — 11603 EXC TR-EXT MAL+MARG 2.1-3 CM: CPT | Mod: S$PBB,51,, | Performed by: DERMATOLOGY

## 2020-05-07 PROCEDURE — 88305 TISSUE EXAM BY PATHOLOGIST: CPT | Mod: 26,,, | Performed by: PATHOLOGY

## 2020-05-07 PROCEDURE — 88305 TISSUE EXAM BY PATHOLOGIST: ICD-10-PCS | Mod: 26,,, | Performed by: PATHOLOGY

## 2020-05-07 NOTE — PROGRESS NOTES
PROCEDURE: Elliptical excision with intermediate layered repair    ANESTHETIC: 10 cc 1% Lidocaine with Epinephrine 1:100,000, buffered    SURGICAL PREP: Betadine    SURGEON: Glenis Pena MD    ASSISTANTS: Pia Dinh MA     PREOPERATIVE DIAGNOSIS: BCC     POSTOPERATIVE DIAGNOSIS: BCC    PATHOLOGIC DIAGNOSIS: Pending    LOCATION: right mid-back    INITIAL LESION SIZE: 2.1 cm    EXCISED DIAMETER: 2.9 cm    PREPARATION:  The diagnosis, procedure, alternatives, benefits and risks, including but not limited to: drug reactions, pain, scar or cosmetic defect, local sensation disturbances, and/or recurrence of present condition were explained to the patient. The patient elected to proceed.    PROCEDURE:  The area of the right mid-back was prepped, draped, and anesthetized in the usual sterile fashion. Lesional tissue was carefully marked prior to administration of the anesthesia. An elliptical excision was drawn around the lesion with margins. A fusiform elliptical excision was done with a #15 blade carried down completely through the dermis into the subcutaneous tissues. Then, with a combination of blunt and sharp dissection, the lesion was removed.  The specimen was marked at the 12 o'clock position with suture and submitted for histologic evaluation. The operative site was widely undermined in the subcutaneous tissue plane. Then, electrocoagulation was used to obtain hemostasis. Blood loss was minimal. The wound was then approximated in a layered fashion with subcutaneous and intradermal 3-0 Vicryl sutures. The wound was then superficially closed with interrupted 3-0 Ethilon sutures.    The patient tolerated the procedure well.    The area was cleaned and dressed appropriately and the patient was given wound care instructions, as well as an appointment for follow-up evaluation.    LENGTH OF REPAIR: 4.2 cm      Follow up in about 2 weeks (around 5/21/2020).

## 2020-05-07 NOTE — PATIENT INSTRUCTIONS
Wound Care    A pressure dressing and vaseline ointment has been placed over the site.  This should remain in place for 48 hours.  It is recommended that you keep the area dry for the first 48 hours.  After 48 hours, you may remove the bandage and wash the area with warm soap and water, apply Vaseline, and keep covered with a bandage.  This should be repeated every 24 hours. Many patients prefer to use Neosporin or Bacitracin ointment.  This is acceptable; however, know that you can develop an allergy to this medication even if you have used it safely for years.  It is important to keep the area moist.  Letting it dry out and get air slows healing time, and will worsen the scar.  Keep the areas covered with a bandage until sutures are removed. Sutures will be removed in 1 week for face sutures and 2 weeks for body sutures unless otherwise specified.      If you notice increasing redness, tenderness, pain, or yellow drainage at the site, please notify your doctor.  These are signs of an infection.    If your site is bleeding, apply firm pressure for 15 minutes straight.  Repeat for another 15 minutes, if it is still bleeding.   If the surgical site continues to bleed, then please contact your doctor.      [facility] - [facility]   DERMATOLOGY  [address]   [place] [address]   Dept: [phone]   Dept Fax: [phone]

## 2020-05-12 LAB
FINAL PATHOLOGIC DIAGNOSIS: NORMAL
GROSS: NORMAL

## 2020-05-21 ENCOUNTER — CLINICAL SUPPORT (OUTPATIENT)
Dept: DERMATOLOGY | Facility: CLINIC | Age: 55
End: 2020-05-21
Payer: OTHER GOVERNMENT

## 2020-05-21 DIAGNOSIS — Z48.02 VISIT FOR SUTURE REMOVAL: Primary | ICD-10-CM

## 2020-05-21 PROCEDURE — 99211 OFF/OP EST MAY X REQ PHY/QHP: CPT | Mod: PBBFAC

## 2020-05-21 PROCEDURE — 99999 PR PBB SHADOW E&M-EST. PATIENT-LVL I: CPT | Mod: PBBFAC,,,

## 2020-05-21 PROCEDURE — 99024 POSTOP FOLLOW-UP VISIT: CPT | Mod: ,,, | Performed by: DERMATOLOGY

## 2020-05-21 PROCEDURE — 99024 PR POST-OP FOLLOW-UP VISIT: ICD-10-PCS | Mod: ,,, | Performed by: DERMATOLOGY

## 2020-05-21 PROCEDURE — 99999 PR PBB SHADOW E&M-EST. PATIENT-LVL I: ICD-10-PCS | Mod: PBBFAC,,,

## 2020-08-20 ENCOUNTER — OFFICE VISIT (OUTPATIENT)
Dept: DERMATOLOGY | Facility: CLINIC | Age: 55
End: 2020-08-20
Payer: OTHER GOVERNMENT

## 2020-08-20 DIAGNOSIS — D48.5 NEOPLASM OF UNCERTAIN BEHAVIOR OF SKIN: ICD-10-CM

## 2020-08-20 DIAGNOSIS — Z85.828 HISTORY OF SKIN CANCER: ICD-10-CM

## 2020-08-20 DIAGNOSIS — L90.5 SCAR CONDITIONS/SKIN FIBROSIS: Primary | ICD-10-CM

## 2020-08-20 DIAGNOSIS — L57.0 ACTINIC KERATOSES: ICD-10-CM

## 2020-08-20 DIAGNOSIS — Z12.83 SCREENING, MALIGNANT NEOPLASM, SKIN: ICD-10-CM

## 2020-08-20 DIAGNOSIS — D22.9 MULTIPLE NEVI: ICD-10-CM

## 2020-08-20 PROCEDURE — 99213 OFFICE O/P EST LOW 20 MIN: CPT | Mod: PBBFAC,25 | Performed by: DERMATOLOGY

## 2020-08-20 PROCEDURE — 11102 TANGNTL BX SKIN SINGLE LES: CPT | Mod: S$PBB,,, | Performed by: DERMATOLOGY

## 2020-08-20 PROCEDURE — 99999 PR PBB SHADOW E&M-EST. PATIENT-LVL III: ICD-10-PCS | Mod: PBBFAC,,, | Performed by: DERMATOLOGY

## 2020-08-20 PROCEDURE — 17000 DESTRUCT PREMALG LESION: CPT | Mod: 59,S$PBB,, | Performed by: DERMATOLOGY

## 2020-08-20 PROCEDURE — 99999 PR PBB SHADOW E&M-EST. PATIENT-LVL III: CPT | Mod: PBBFAC,,, | Performed by: DERMATOLOGY

## 2020-08-20 PROCEDURE — 88305 TISSUE EXAM BY PATHOLOGIST: ICD-10-PCS | Mod: 26,,, | Performed by: PATHOLOGY

## 2020-08-20 PROCEDURE — 88305 TISSUE EXAM BY PATHOLOGIST: CPT | Mod: 26,,, | Performed by: PATHOLOGY

## 2020-08-20 PROCEDURE — 88305 TISSUE EXAM BY PATHOLOGIST: CPT | Performed by: PATHOLOGY

## 2020-08-20 PROCEDURE — 11102 TANGNTL BX SKIN SINGLE LES: CPT | Mod: PBBFAC | Performed by: DERMATOLOGY

## 2020-08-20 PROCEDURE — 99213 OFFICE O/P EST LOW 20 MIN: CPT | Mod: 25,S$PBB,, | Performed by: DERMATOLOGY

## 2020-08-20 PROCEDURE — 11102 PR TANGENTIAL BIOPSY, SKIN, SINGLE LESION: ICD-10-PCS | Mod: S$PBB,,, | Performed by: DERMATOLOGY

## 2020-08-20 PROCEDURE — 17000 PR DESTRUCTION(LASER SURGERY,CRYOSURGERY,CHEMOSURGERY),PREMALIGNANT LESIONS,FIRST LESION: ICD-10-PCS | Mod: 59,S$PBB,, | Performed by: DERMATOLOGY

## 2020-08-20 PROCEDURE — 99213 PR OFFICE/OUTPT VISIT, EST, LEVL III, 20-29 MIN: ICD-10-PCS | Mod: 25,S$PBB,, | Performed by: DERMATOLOGY

## 2020-08-20 PROCEDURE — 17000 DESTRUCT PREMALG LESION: CPT | Mod: 59,PBBFAC | Performed by: DERMATOLOGY

## 2020-08-20 NOTE — PATIENT INSTRUCTIONS
Shave Biopsy Wound Care    Your doctor has performed a shave biopsy today.  A band aid and vaseline ointment has been placed over the site.  This should remain in place for 24 hours.  It is recommended that you keep the area dry for the first 24 hours.  After 24 hours, you may remove the band aid and wash the area with warm soap and water and apply Vaseline jelly.  Many patients prefer to use Neosporin or Bacitracin ointment.  This is acceptable; however, know that you can develop an allergy to this medication even if you have used it safely for years.  It is important to keep the area moist.  Letting it dry out and get air slows healing time, and will worsen the scar.  Band aid is optional after first 24 hours.      If you notice increasing redness, tenderness, pain, or yellow drainage at the biopsy site, please notify your doctor.  These are signs of an infection.    If your biopsy site is bleeding, apply firm pressure for 15 minutes straight.  Repeat for another 15 minutes, if it is still bleeding.   If the surgical site continues to bleed, then please contact your doctor.      [facility]   DERMATOLOGY  [address] [address]   Dept: [phone]   Dept Fax: [phone]           Monitoring Moles  Moles, also called nevi, are small, pigmented (colored) marks on the skin. They have no known purpose. Many moles appear before age 30, but they also increase frequently as people age. Moles most often are benign (not cancer) and harmless. But some become cancerous. Thats why you need to watch the moles on your body and tell your health care provider about any concern you.    What are moles?  Moles are a type of pigmented sharath. Freckles, which often are sprinkled across the bridge of the nose, the cheeks, and the arms, are another type of pigmented sharath. Moles can appear on any part of the body. There are many types, sizes, and shapes of moles. Most moles  are solid brown. In most cases, they are flat or dome-shaped, smooth, and have well-defined edges. Freckles are flat.  Why worry about moles?  Most moles are benign and dont require treatment. You can have moles removed if you dont like the way they look or feel. But moles that appear after you are 30 or that change in certain ways may become a problem. These moles may turn into melanoma, a type of skin cancer. Melanoma is one of the fastest growing cancers in the [place], but it is often curable if caught early. But this disease can be life-threatening, particularly when not diagnosed early. The more moles someone has, the higher the risk. Risk is also higher for those who have had more lifetime exposure to the sun, severe blistering sunburns, exposure to tanning beds, a prior personal history of cancer, and those with a family history of skin cancer. To manage your risk, its smart to check your moles for changes and ask your health care provider to perform a thorough skin exam when you have a physical exam. To do this, you first need to learn where your moles are. Then, be sure to check your moles each month.    Checking your moles  You can check many of your moles each month. You can do this right after you shower and before you get dressed. Check your body from head to toe. Then, make a list of your moles. If you find any new moles or changes in your moles, call your health care provider. To check your moles, youll need:  · A full-length mirror  · A stool or chair to sit on while you check your feet  If you have a lot of moles, take digital photos of them each month. Make sure to take photos both up close and from a distance. These can help you see if any moles change over time.  When to seek medical treatment  See your health care provider if your moles hurt, itch, ooze, bleed, thicken, become crusty, or show other changes. Also, be sure to call your health care provider if your moles show any of the  following signs of melanoma:  · A change in size, shape, color, or elevation  · Asymmetry (when the sides dont match)  · Ragged, notched, or blurred borders  · Varied colors within the same mole  · Size is larger than 5 mm or 6 mm in diameter (the size of a pencil eraser)  © 3150-8519 [employer]. [address] [place] [address]. All rights reserved. This information is not intended as a substitute for professional medical care. Always follow your healthcare professional's instructions.

## 2020-08-20 NOTE — PROGRESS NOTES
Subjective:       Patient ID:  Tova Muñoz is a 55 y.o. female who presents for   Chief Complaint   Patient presents with    Skin Check     Hx of NMSC of the left nasal ala, BCC of the right mid-back (s/p excision on 5/7/20) and IDN of the right nasal ala, last seen on 3/5/20.  Denies bleeding, tender, growing, or concerning lesions.     This is a high risk patient here to check for the development of new lesions.        Review of Systems   Constitutional: Negative for fever and chills.   Gastrointestinal: Negative for nausea and vomiting.   Skin: Positive for activity-related sunscreen use. Negative for daily sunscreen use and recent sunburn.   Hematologic/Lymphatic: Does not bruise/bleed easily.        Objective:    Physical Exam   Constitutional: She appears well-developed and well-nourished. No distress.   Neurological: She is alert and oriented to person, place, and time. She is not disoriented.   Psychiatric: She has a normal mood and affect.   Skin:   Areas Examined (abnormalities noted in diagram):   Head / Face Inspection Performed  Neck Inspection Performed  Chest / Axilla Inspection Performed  Abdomen Inspection Performed  Back Inspection Performed  RUE Inspected  LUE Inspection Performed  RLE Inspected  LLE Inspection Performed  Nails and Digits Inspection Performed                       Diagram Legend     Erythematous scaling macule/papule c/w actinic keratosis       Vascular papule c/w angioma      Pigmented verrucoid papule/plaque c/w seborrheic keratosis      Yellow umbilicated papule c/w sebaceous hyperplasia      Irregularly shaped tan macule c/w lentigo     1-2 mm smooth white papules consistent with Milia      Movable subcutaneous cyst with punctum c/w epidermal inclusion cyst      Subcutaneous movable cyst c/w pilar cyst      Firm pink to brown papule c/w dermatofibroma      Pedunculated fleshy papule(s) c/w skin tag(s)      Evenly pigmented macule c/w junctional nevus     Mildly  variegated pigmented, slightly irregular-bordered macule c/w mildly atypical nevus      Flesh colored to evenly pigmented papule c/w intradermal nevus       Pink pearly papule/plaque c/w basal cell carcinoma      Erythematous hyperkeratotic cursted plaque c/w SCC      Surgical scar with no sign of skin cancer recurrence      Open and closed comedones      Inflammatory papules and pustules      Verrucoid papule consistent consistent with wart     Erythematous eczematous patches and plaques     Dystrophic onycholytic nail with subungual debris c/w onychomycosis     Umbilicated papule    Erythematous-base heme-crusted tan verrucoid plaque consistent with inflamed seborrheic keratosis     Erythematous Silvery Scaling Plaque c/w Psoriasis     See annotation                Assessment / Plan:      Pathology Orders:     Normal Orders This Visit    Specimen to Pathology, Dermatology     Questions:    Procedure Type: Dermatology and skin neoplasms    Number of Specimens: 1    ------------------------: -------------------------    Spec 1 Procedure: Biopsy    Spec 1 Clinical Impression: DF vs. other    Spec 1 Source: left upper arm    Clinical Information: see above        Scar conditions/skin fibrosis  Screening, malignant neoplasm, skin  History of skin cancer  Scar of the left nasal ala, right mid-back, hx of NMSC.  No evidence of recurrence on physical exam today.  Continue routine skin surveillance. Daily sunscreen advised.    Multiple nevi  Reassurance given.  Discussed ABCDEF of melanoma and changes for patient to look for.  Discussed importance of daily use of sunscreen which is broad-spectrum and has a minimum SPF of 30.    Actinic keratoses  Cryosurgery Procedure Note    The patient is informed of the precancerous quality and need for treatment of these lesions. After risks, benefits and alternatives explained, including blistering, pain, hyper- and hypopigmentation, patient verbally consents to cryotherapy to  precancerous lesions. Liquid nitrogen cryosurgery is applied to the 1 actinic keratoses, as detailed in the physical exam, to produce a freeze injury. The patient is aware that blisters may form and is instructed on wound care with gentle cleansing and use of vaseline ointment to keep moist until healed. The patient is supplied a handout on cryosurgery and is instructed to call if lesions do not completely resolve.    Neoplasm of uncertain behavior of skin  -     Specimen to Pathology, Dermatology  -     Shave biopsy(-ies) done of 1 site(s).   Patient informed to call for results within 2 weeks if have not received notification via telephone call or [facility]           Follow up for call for results.     PROCEDURE NOTE - SHAVE BIOPSY   Location: see above    After risk, benefits, and alternatives were discussed with the patient, the patient agrees to the procedure by verbal informed consent.  The area(s) were cleansed with alcohol. 2 cc of lidocaine 1% with epinephrine was injected for local anesthesia into each lesion(s).  A sharp dermablade was used to remove part or all of the lesion(s).  The specimen(s) will be sent for tissue pathology.  Hemostasis was obtained with aluminum chloride and/or hyfrecation.  The area(s) were dressed with vaseline ointment and bandaged.  The patient tolerated the procedure well without adverse events.  Wound care instructions were given to the patient on the AVS.  The patient will be notified of pathology results once available. Results will also be available in Epic.

## 2020-08-25 LAB
FINAL PATHOLOGIC DIAGNOSIS: NORMAL
GROSS: NORMAL

## 2020-09-15 ENCOUNTER — PATIENT OUTREACH (OUTPATIENT)
Dept: ADMINISTRATIVE | Facility: HOSPITAL | Age: 55
End: 2020-09-15

## 2020-09-15 NOTE — PROGRESS NOTES
Working uncontrolled cervical cancer screening report.  Noted pt already scheduled with dr garrett in December.

## 2020-10-06 ENCOUNTER — LAB VISIT (OUTPATIENT)
Dept: LAB | Facility: HOSPITAL | Age: 55
End: 2020-10-06
Attending: FAMILY MEDICINE
Payer: OTHER GOVERNMENT

## 2020-10-06 ENCOUNTER — OFFICE VISIT (OUTPATIENT)
Dept: PRIMARY CARE CLINIC | Facility: CLINIC | Age: 55
End: 2020-10-06
Payer: OTHER GOVERNMENT

## 2020-10-06 VITALS
DIASTOLIC BLOOD PRESSURE: 84 MMHG | WEIGHT: 189.5 LBS | BODY MASS INDEX: 35.78 KG/M2 | HEART RATE: 66 BPM | HEIGHT: 61 IN | TEMPERATURE: 97 F | SYSTOLIC BLOOD PRESSURE: 124 MMHG

## 2020-10-06 DIAGNOSIS — Z00.00 ROUTINE GENERAL MEDICAL EXAMINATION AT A HEALTH CARE FACILITY: ICD-10-CM

## 2020-10-06 DIAGNOSIS — E66.9 OBESITY, CLASS II, BMI 35-39.9: ICD-10-CM

## 2020-10-06 DIAGNOSIS — G47.00 INSOMNIA, UNSPECIFIED TYPE: ICD-10-CM

## 2020-10-06 DIAGNOSIS — R03.0 ELEVATED BP WITHOUT DIAGNOSIS OF HYPERTENSION: ICD-10-CM

## 2020-10-06 DIAGNOSIS — N95.1 HOT FLASHES DUE TO MENOPAUSE: ICD-10-CM

## 2020-10-06 DIAGNOSIS — F39 MOOD DISORDER: Primary | ICD-10-CM

## 2020-10-06 DIAGNOSIS — G89.29 CHRONIC NONINTRACTABLE HEADACHE, UNSPECIFIED HEADACHE TYPE: ICD-10-CM

## 2020-10-06 DIAGNOSIS — R51.9 CHRONIC NONINTRACTABLE HEADACHE, UNSPECIFIED HEADACHE TYPE: ICD-10-CM

## 2020-10-06 DIAGNOSIS — L30.9 DERMATITIS: ICD-10-CM

## 2020-10-06 DIAGNOSIS — M25.50 ARTHRALGIA, UNSPECIFIED JOINT: ICD-10-CM

## 2020-10-06 DIAGNOSIS — E66.9 CLASS 1 OBESITY WITHOUT SERIOUS COMORBIDITY WITH BODY MASS INDEX (BMI) OF 31.0 TO 31.9 IN ADULT, UNSPECIFIED OBESITY TYPE: ICD-10-CM

## 2020-10-06 LAB
ALBUMIN SERPL BCP-MCNC: 4.3 G/DL (ref 3.5–5.2)
ALP SERPL-CCNC: 72 U/L (ref 55–135)
ALT SERPL W/O P-5'-P-CCNC: 23 U/L (ref 10–44)
ANION GAP SERPL CALC-SCNC: 8 MMOL/L (ref 8–16)
AST SERPL-CCNC: 19 U/L (ref 10–40)
BASOPHILS # BLD AUTO: 0.01 K/UL (ref 0–0.2)
BASOPHILS NFR BLD: 0.3 % (ref 0–1.9)
BILIRUB SERPL-MCNC: 0.5 MG/DL (ref 0.1–1)
BUN SERPL-MCNC: 14 MG/DL (ref 6–20)
CALCIUM SERPL-MCNC: 9.3 MG/DL (ref 8.7–10.5)
CHLORIDE SERPL-SCNC: 104 MMOL/L (ref 95–110)
CHOLEST SERPL-MCNC: 262 MG/DL (ref 120–199)
CHOLEST/HDLC SERPL: 4 {RATIO} (ref 2–5)
CO2 SERPL-SCNC: 27 MMOL/L (ref 23–29)
CREAT SERPL-MCNC: 0.8 MG/DL (ref 0.5–1.4)
CRP SERPL-MCNC: 3.2 MG/L (ref 0–8.2)
DIFFERENTIAL METHOD: ABNORMAL
EOSINOPHIL # BLD AUTO: 0.1 K/UL (ref 0–0.5)
EOSINOPHIL NFR BLD: 1.6 % (ref 0–8)
ERYTHROCYTE [DISTWIDTH] IN BLOOD BY AUTOMATED COUNT: 12.6 % (ref 11.5–14.5)
ERYTHROCYTE [SEDIMENTATION RATE] IN BLOOD BY WESTERGREN METHOD: 44 MM/HR (ref 0–36)
EST. GFR  (AFRICAN AMERICAN): >60 ML/MIN/1.73 M^2
EST. GFR  (NON AFRICAN AMERICAN): >60 ML/MIN/1.73 M^2
GLUCOSE SERPL-MCNC: 92 MG/DL (ref 70–110)
HCT VFR BLD AUTO: 36.5 % (ref 37–48.5)
HDLC SERPL-MCNC: 66 MG/DL (ref 40–75)
HDLC SERPL: 25.2 % (ref 20–50)
HGB BLD-MCNC: 11.8 G/DL (ref 12–16)
IMM GRANULOCYTES # BLD AUTO: 0 K/UL (ref 0–0.04)
IMM GRANULOCYTES NFR BLD AUTO: 0 % (ref 0–0.5)
LDLC SERPL CALC-MCNC: 172.2 MG/DL (ref 63–159)
LYMPHOCYTES # BLD AUTO: 1.3 K/UL (ref 1–4.8)
LYMPHOCYTES NFR BLD: 34.3 % (ref 18–48)
MCH RBC QN AUTO: 33.3 PG (ref 27–31)
MCHC RBC AUTO-ENTMCNC: 32.3 G/DL (ref 32–36)
MCV RBC AUTO: 103 FL (ref 82–98)
MONOCYTES # BLD AUTO: 0.3 K/UL (ref 0.3–1)
MONOCYTES NFR BLD: 7 % (ref 4–15)
NEUTROPHILS # BLD AUTO: 2.1 K/UL (ref 1.8–7.7)
NEUTROPHILS NFR BLD: 56.8 % (ref 38–73)
NONHDLC SERPL-MCNC: 196 MG/DL
NRBC BLD-RTO: 0 /100 WBC
PLATELET # BLD AUTO: 162 K/UL (ref 150–350)
PMV BLD AUTO: 10.3 FL (ref 9.2–12.9)
POTASSIUM SERPL-SCNC: 4.3 MMOL/L (ref 3.5–5.1)
PROT SERPL-MCNC: 7.5 G/DL (ref 6–8.4)
RBC # BLD AUTO: 3.54 M/UL (ref 4–5.4)
SODIUM SERPL-SCNC: 139 MMOL/L (ref 136–145)
T4 FREE SERPL-MCNC: 1.07 NG/DL (ref 0.71–1.51)
TRIGL SERPL-MCNC: 119 MG/DL (ref 30–150)
TSH SERPL DL<=0.005 MIU/L-ACNC: 3.52 UIU/ML (ref 0.4–4)
WBC # BLD AUTO: 3.7 K/UL (ref 3.9–12.7)

## 2020-10-06 PROCEDURE — 86140 C-REACTIVE PROTEIN: CPT

## 2020-10-06 PROCEDURE — 99999 PR PBB SHADOW E&M-EST. PATIENT-LVL III: CPT | Mod: PBBFAC,,, | Performed by: FAMILY MEDICINE

## 2020-10-06 PROCEDURE — 99999 PR PBB SHADOW E&M-EST. PATIENT-LVL III: ICD-10-PCS | Mod: PBBFAC,,, | Performed by: FAMILY MEDICINE

## 2020-10-06 PROCEDURE — 83525 ASSAY OF INSULIN: CPT

## 2020-10-06 PROCEDURE — 84439 ASSAY OF FREE THYROXINE: CPT

## 2020-10-06 PROCEDURE — 85025 COMPLETE CBC W/AUTO DIFF WBC: CPT

## 2020-10-06 PROCEDURE — 36415 COLL VENOUS BLD VENIPUNCTURE: CPT | Mod: PN

## 2020-10-06 PROCEDURE — 80061 LIPID PANEL: CPT

## 2020-10-06 PROCEDURE — 99396 PREV VISIT EST AGE 40-64: CPT | Mod: S$PBB,,, | Performed by: FAMILY MEDICINE

## 2020-10-06 PROCEDURE — 99213 OFFICE O/P EST LOW 20 MIN: CPT | Mod: PBBFAC,PN | Performed by: FAMILY MEDICINE

## 2020-10-06 PROCEDURE — 83036 HEMOGLOBIN GLYCOSYLATED A1C: CPT

## 2020-10-06 PROCEDURE — 85652 RBC SED RATE AUTOMATED: CPT

## 2020-10-06 PROCEDURE — 80053 COMPREHEN METABOLIC PANEL: CPT

## 2020-10-06 PROCEDURE — 99396 PR PREVENTIVE VISIT,EST,40-64: ICD-10-PCS | Mod: S$PBB,,, | Performed by: FAMILY MEDICINE

## 2020-10-06 PROCEDURE — 84443 ASSAY THYROID STIM HORMONE: CPT

## 2020-10-06 RX ORDER — HYDROCHLOROTHIAZIDE 12.5 MG/1
12.5 TABLET ORAL DAILY PRN
Qty: 30 TABLET | Refills: 1 | Status: SHIPPED | OUTPATIENT
Start: 2020-10-06 | End: 2021-01-11

## 2020-10-06 RX ORDER — PROPRANOLOL HYDROCHLORIDE 60 MG/1
60 TABLET ORAL NIGHTLY
Qty: 90 TABLET | Refills: 3 | Status: SHIPPED | OUTPATIENT
Start: 2020-10-06 | End: 2021-12-28

## 2020-10-06 RX ORDER — CITALOPRAM 10 MG/1
10 TABLET ORAL DAILY
Qty: 90 TABLET | Refills: 3 | Status: SHIPPED | OUTPATIENT
Start: 2020-10-06 | End: 2021-12-28

## 2020-10-06 NOTE — PROGRESS NOTES
Subjective:      Patient ID: Tova Muñoz is a 55 y.o. female.    Chief Complaint: Annual Exam and Medication Refill    Disclaimer:  This note is prepared using voice recognition software and as such is likely to have errors and has not been proof read. Please contact me for questions.     Patient is coming in today for prevention exam as well as weight loss and chronic issues.  Has been working from home and office. Is stressed again.  Weight is up again as well.  Not being able to exercise as much.  Previously was placed on Celexa to help some with mood symptoms and hormonal issues but ran out.  Did find that she was doing better when she was on the medication.  Would like to get back on it.  Has a lot of stressors going on.    Lab Results       Component                Value               Date                       HGBA1C                   5.5                 04/23/2019             Lab Results       Component                Value               Date                       CHOL                     241 (H)             04/23/2019                 CHOL                     253 (H)             03/09/2018                 CHOL                     265 (H)             03/15/2017            Lab Results       Component                Value               Date                       LDLCALC                  163.0 (H)           04/23/2019                 LDLCALC                  162.8 (H)           03/09/2018                 LDLCALC                  177.6 (H)           03/15/2017              Wt Readings from Last 10 Encounters:  10/06/20 : 86 kg (189 lb 7.8 oz)  12/06/19 : 81.6 kg (180 lb)  09/17/19 : 83.8 kg (184 lb 11.9 oz)  04/29/19 : 81.4 kg (179 lb 7.3 oz)  03/06/19 : 78 kg (171 lb 15.3 oz)  02/06/19 : 78.7 kg (173 lb 8 oz)  12/21/18 : 78.6 kg (173 lb 4.5 oz)  11/21/18 : 79.2 kg (174 lb 9.7 oz)  10/24/18 : 80.5 kg (177 lb 7.5 oz)  10/04/18 : 79.7 kg (175 lb 11.3 oz)      The 10-year ASCVD risk score (Svetlanajosey VERAS Jr., et al.,  2013) is: 2.1%    Values used to calculate the score:      Age: 55 years      Sex: Female      Is Non- : No      Diabetic: No      Tobacco smoker: No      Systolic Blood Pressure: 124 mmHg      Is BP treated: No      HDL Cholesterol: 65 mg/dL      Total Cholesterol: 241 mg/dL      Going to start the [employer] plan in 2 weeks. 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 She is operations managers to get things fixed.   Is needing a catalyst. Can't really stop to plan or meal prep.  Just has a lot of stress as well.  Does have an upcoming vacation to go to [place] in 2 weeks.  Has been also had a heart attack and stent placements.  Son is also going through a lot in his relationships and marriage as well.  Just feels like she is extremely stressed.    Extremely hard Trying to fit an exercise program with her schedule.     Also reports a nail issues since having her nails done.  Uncertain if it was fungus related or not.  She has been applying some cortisone cream but has not been helping as much.  Is slightly itchy as well for her.      Also has chronic migraines and headaches and lately been having more.  Does need a refill the propanolol.  Does not want to go back on Topamax    Also having sleep difficulty and depressed mood.  Was previously started on Celexa 10 to help with hot flashes.    Also reports that she is having a lot more swelling.  Uncertain if related to hormonal issues versus just the weight gain.  Mainly in the hands.  Interested in doing inflammatory markers also.    Desires to also discussed health maintenance and prevention.  Willing to set up mammogram.  Has set up to see Dr. Richard at the end of this month since Dr. Middleton just left [facility] for Pap smear.      Lab Results   Component Value Date    WBC 4.56 04/23/2019    HGB 12.5 04/23/2019    HCT 39.6 04/23/2019     04/23/2019    CHOL 241 (H) 04/23/2019    TRIG 65 04/23/2019    HDL 65  04/23/2019    ALT 25 04/23/2019    AST 21 04/23/2019     04/23/2019    K 4.8 04/23/2019     04/23/2019    CREATININE 0.9 04/23/2019    BUN 19 04/23/2019    CO2 24 04/23/2019    TSH 2.415 04/23/2019    HGBA1C 5.5 04/23/2019       Mammo Digital Screening Bilat w/ Jonny  Narrative: Result:  Mammo Digital Screening Bilat w/ Jonny    History:  Patient is 54 y.o. and is seen for a screening mammogram.    Films Compared:  Compared to: 10/04/2018 Mammo Digital Screening Bilat With CAD and   09/06/2017 Mammo Digital Screening Bilat with CAD    Findings:  Computer-aided detection was utilized in the interpretation of this   examination. This procedure was performed using tomosynthesis.       The breasts have scattered areas of fibroglandular density. There is no   evidence of suspicious masses, microcalcifications or architectural   distortion.  Impression: No mammographic evidence of malignancy.    BI-RADS Category 1: Negative    Recommendation:  Routine screening mammogram in 1 year is recommended.     Your estimated lifetime risk of breast cancer (to age 85) based on   Tyrer-Cuzick risk assessment model is Tyrer-Cuzick: 12.24 %. According to   the American Cancer Society, patients with a lifetime breast cancer risk   of 20% or higher might benefit from supplemental screening tests. ??         Review of Systems   Constitutional: Positive for activity change, appetite change, fatigue and unexpected weight change. Negative for chills and fever.   HENT: Negative for ear pain and trouble swallowing.    Eyes: Negative for pain and visual disturbance.   Respiratory: Negative for cough and shortness of breath.    Cardiovascular: Negative for chest pain and leg swelling.   Gastrointestinal: Negative for abdominal pain, blood in stool, nausea and vomiting.   Endocrine: Negative for cold intolerance and heat intolerance.   Genitourinary: Negative for dysuria and frequency.   Musculoskeletal: Positive for arthralgias.  "Negative for joint swelling, myalgias and neck pain.   Skin: Positive for color change, rash and wound.   Neurological: Positive for headaches. Negative for dizziness, weakness and numbness.   Psychiatric/Behavioral: Positive for decreased concentration and dysphoric mood. Negative for behavioral problems and sleep disturbance. The patient is not nervous/anxious.      Objective:     Vitals:    10/06/20 0732   BP: 124/84   Pulse: 66   Temp: 97.3 °F (36.3 °C)   Weight: 86 kg (189 lb 7.8 oz)   Height: 5' 1" (1.549 m)     Physical Exam  Vitals signs reviewed.   Constitutional:       Appearance: Normal appearance. She is well-developed. She is obese.   HENT:      Head: Normocephalic and atraumatic.      Right Ear: Tympanic membrane and external ear normal.      Left Ear: Tympanic membrane and external ear normal.      Nose: Nose normal.      Mouth/Throat:      Mouth: Mucous membranes are moist.      Pharynx: Oropharynx is clear.   Eyes:      Conjunctiva/sclera: Conjunctivae normal.      Pupils: Pupils are equal, round, and reactive to light.   Neck:      Musculoskeletal: Normal range of motion and neck supple.      Thyroid: No thyromegaly.   Cardiovascular:      Rate and Rhythm: Normal rate and regular rhythm.      Heart sounds: No murmur. No friction rub. No gallop.    Pulmonary:      Effort: Pulmonary effort is normal. No respiratory distress.      Breath sounds: No wheezing or rales.   Abdominal:      General: Bowel sounds are normal. There is no distension.      Palpations: Abdomen is soft.      Tenderness: There is no abdominal tenderness. There is no rebound.   Musculoskeletal: Normal range of motion.   Lymphadenopathy:      Cervical: No cervical adenopathy.   Skin:     General: Skin is warm and dry.      Findings: Erythema and rash present. Rash is papular.          Neurological:      Mental Status: She is alert and oriented to person, place, and time.   Psychiatric:         Attention and Perception: Attention and " perception normal.         Mood and Affect: Mood is depressed. Affect is tearful.         Speech: Speech normal.         Behavior: Behavior normal.         Thought Content: Thought content normal.         Cognition and Memory: Cognition and memory normal.         Judgment: Judgment normal.       Assessment:     1. Mood disorder    2. Chronic nonintractable headache, unspecified headache type    3. Elevated BP without diagnosis of hypertension    4. Hot flashes due to menopause    5. Arthralgia, unspecified joint    6. Obesity, Class II, BMI 35-39.9    7. Insomnia, unspecified type    8. Dermatitis    9. Routine general medical examination at a health care facility      Plan:   Tova was seen today for annual exam and medication refill.    Diagnoses and all orders for this visit:    Mood disorder  Comments:  Restarting Celexa 10 mg discussed diet and going ahead and getting started on the program now so she can improve over the next 2 weeks before trip    Chronic nonintractable headache, unspecified headache type  Comments:  Worse lately due to stress refill propanolol discussed diet plan exercise and motivation  Orders:  -     TSH; Future  -     T4, Free; Future  -     Lipid Panel; Future  -     Cancel: Microalbumin/Creatinine Ratio, Urine  -     Hemoglobin A1C; Future  -     Insulin, Random; Future  -     Comprehensive Metabolic Panel; Future  -     CBC auto differential; Future  -     Mammo Digital Screening Bilat; Future  -     Sedimentation rate; Future  -     C-Reactive Protein; Future    Elevated BP without diagnosis of hypertension  Comments:  On propanolol mainly more for headaches but also benefitting with diet plan.  Some swelling start HCTZ low-dose p.r.n. edema  Orders:  -     TSH; Future  -     T4, Free; Future  -     Lipid Panel; Future  -     Cancel: Microalbumin/Creatinine Ratio, Urine  -     Hemoglobin A1C; Future  -     Insulin, Random; Future  -     Comprehensive Metabolic Panel; Future  -     CBC  auto differential; Future  -     Mammo Digital Screening Bilat; Future  -     Sedimentation rate; Future  -     C-Reactive Protein; Future    Hot flashes due to menopause  Comments:  Restarting Celexa.  Orders:  -     TSH; Future  -     T4, Free; Future  -     Lipid Panel; Future  -     Cancel: Microalbumin/Creatinine Ratio, Urine  -     Hemoglobin A1C; Future  -     Insulin, Random; Future  -     Comprehensive Metabolic Panel; Future  -     CBC auto differential; Future  -     Mammo Digital Screening Bilat; Future  -     Sedimentation rate; Future  -     C-Reactive Protein; Future    Arthralgia, unspecified joint  Comments:  Checking for inflammatory markers also suspect related to weight gain insulin resistance.  Checking lab work today.  Orders:  -     TSH; Future  -     T4, Free; Future  -     Lipid Panel; Future  -     Cancel: Microalbumin/Creatinine Ratio, Urine  -     Hemoglobin A1C; Future  -     Insulin, Random; Future  -     Comprehensive Metabolic Panel; Future  -     CBC auto differential; Future  -     Mammo Digital Screening Bilat; Future  -     Sedimentation rate; Future  -     C-Reactive Protein; Future    Obesity, Class II, BMI 35-39.9  Comments:  Discussed diet exercise and weight loss along with her meal planning.  Obtain lab work today.  Follow back up in 3 months for accountability  Orders:  -     TSH; Future  -     T4, Free; Future  -     Lipid Panel; Future  -     Cancel: Microalbumin/Creatinine Ratio, Urine  -     Hemoglobin A1C; Future  -     Insulin, Random; Future  -     Comprehensive Metabolic Panel; Future  -     CBC auto differential; Future  -     Mammo Digital Screening Bilat; Future  -     Sedimentation rate; Future  -     C-Reactive Protein; Future    Insomnia, unspecified type  Comments:  Noted, restarting Celexa consideration for melatonin 5 mg 20 min prior to sleep  Orders:  -     TSH; Future  -     T4, Free; Future  -     Lipid Panel; Future  -     Cancel:  Microalbumin/Creatinine Ratio, Urine  -     Hemoglobin A1C; Future  -     Insulin, Random; Future  -     Comprehensive Metabolic Panel; Future  -     CBC auto differential; Future  -     Mammo Digital Screening Bilat; Future  -     Sedimentation rate; Future  -     C-Reactive Protein; Future    Dermatitis  Comments:  Newly noted advised on doing topical Lotrisone or antifungal athlete's foot cream with steroid cream if not improving follow-up for oral treatment    Routine general medical examination at a health care facility  Comments:  Lab work today discussed health maintenance discussed diet plans.  Set up mammogram.  Orders:  -     TSH; Future  -     T4, Free; Future  -     Lipid Panel; Future  -     Cancel: Microalbumin/Creatinine Ratio, Urine  -     Hemoglobin A1C; Future  -     Insulin, Random; Future  -     Comprehensive Metabolic Panel; Future  -     CBC auto differential; Future  -     Mammo Digital Screening Bilat; Future  -     Sedimentation rate; Future  -     C-Reactive Protein; Future  -     Microalbumin/Creatinine Ratio, Urine; Future    Other orders  -     citalopram (CELEXA) 10 MG tablet; Take 1 tablet (10 mg total) by mouth once daily.  -     hydroCHLOROthiazide (HYDRODIURIL) 12.5 MG Tab; Take 1 tablet (12.5 mg total) by mouth daily as needed (edema).  -     propranoloL (INDERAL) 60 MG tablet; Take 1 tablet (60 mg total) by mouth every evening. For prevention of migraines.        Time spent: 40 minutes in face to face discussion concerning diagnosis, prognosis, review of lab and test results, benefits of treatment as well as management of disease, counseling of patient and coordination of care between various health care providers . Greater than half the time spent was used for coordination of care and counseling of patient.       Follow up in about 3 months (around 1/6/2021) for f/u Telemed Dr Lopez/ nilson.    Patient Instructions   Melatonin no more than 5 mg, take 20-30min.

## 2020-10-07 ENCOUNTER — TELEPHONE (OUTPATIENT)
Dept: PRIMARY CARE CLINIC | Facility: CLINIC | Age: 55
End: 2020-10-07

## 2020-10-07 DIAGNOSIS — E66.9 OBESITY, UNSPECIFIED CLASSIFICATION, UNSPECIFIED OBESITY TYPE, UNSPECIFIED WHETHER SERIOUS COMORBIDITY PRESENT: ICD-10-CM

## 2020-10-07 DIAGNOSIS — R03.0 ELEVATED BP WITHOUT DIAGNOSIS OF HYPERTENSION: Primary | ICD-10-CM

## 2020-10-07 DIAGNOSIS — R79.82 ELEVATED C-REACTIVE PROTEIN (CRP): ICD-10-CM

## 2020-10-07 DIAGNOSIS — E78.5 HYPERLIPIDEMIA, UNSPECIFIED HYPERLIPIDEMIA TYPE: ICD-10-CM

## 2020-10-07 DIAGNOSIS — M25.50 ARTHRALGIA, UNSPECIFIED JOINT: ICD-10-CM

## 2020-10-07 DIAGNOSIS — D64.9 ANEMIA, UNSPECIFIED TYPE: ICD-10-CM

## 2020-10-07 LAB
ESTIMATED AVG GLUCOSE: 108 MG/DL (ref 68–131)
HBA1C MFR BLD HPLC: 5.4 % (ref 4–5.6)
INSULIN COLLECTION INTERVAL: NORMAL
INSULIN SERPL-ACNC: 3.5 UU/ML

## 2020-11-19 ENCOUNTER — LAB VISIT (OUTPATIENT)
Dept: LAB | Facility: HOSPITAL | Age: 55
End: 2020-11-19
Attending: FAMILY MEDICINE
Payer: OTHER GOVERNMENT

## 2020-11-19 DIAGNOSIS — E66.9 OBESITY, UNSPECIFIED CLASSIFICATION, UNSPECIFIED OBESITY TYPE, UNSPECIFIED WHETHER SERIOUS COMORBIDITY PRESENT: ICD-10-CM

## 2020-11-19 DIAGNOSIS — M25.50 ARTHRALGIA, UNSPECIFIED JOINT: ICD-10-CM

## 2020-11-19 DIAGNOSIS — R79.82 ELEVATED C-REACTIVE PROTEIN (CRP): ICD-10-CM

## 2020-11-19 DIAGNOSIS — R03.0 ELEVATED BP WITHOUT DIAGNOSIS OF HYPERTENSION: ICD-10-CM

## 2020-11-19 DIAGNOSIS — D64.9 ANEMIA, UNSPECIFIED TYPE: ICD-10-CM

## 2020-11-19 DIAGNOSIS — E78.5 HYPERLIPIDEMIA, UNSPECIFIED HYPERLIPIDEMIA TYPE: ICD-10-CM

## 2020-11-19 LAB
ALBUMIN SERPL BCP-MCNC: 4.3 G/DL (ref 3.5–5.2)
ALP SERPL-CCNC: 60 U/L (ref 55–135)
ALT SERPL W/O P-5'-P-CCNC: 18 U/L (ref 10–44)
ANION GAP SERPL CALC-SCNC: 9 MMOL/L (ref 8–16)
AST SERPL-CCNC: 20 U/L (ref 10–40)
BASOPHILS # BLD AUTO: 0.01 K/UL (ref 0–0.2)
BASOPHILS NFR BLD: 0.3 % (ref 0–1.9)
BILIRUB SERPL-MCNC: 0.5 MG/DL (ref 0.1–1)
BUN SERPL-MCNC: 24 MG/DL (ref 6–20)
CALCIUM SERPL-MCNC: 9.3 MG/DL (ref 8.7–10.5)
CHLORIDE SERPL-SCNC: 104 MMOL/L (ref 95–110)
CHOLEST SERPL-MCNC: 216 MG/DL (ref 120–199)
CHOLEST/HDLC SERPL: 3.5 {RATIO} (ref 2–5)
CO2 SERPL-SCNC: 27 MMOL/L (ref 23–29)
CREAT SERPL-MCNC: 0.8 MG/DL (ref 0.5–1.4)
CRP SERPL-MCNC: 5.8 MG/L (ref 0–8.2)
DIFFERENTIAL METHOD: ABNORMAL
EOSINOPHIL # BLD AUTO: 0 K/UL (ref 0–0.5)
EOSINOPHIL NFR BLD: 0.9 % (ref 0–8)
ERYTHROCYTE [DISTWIDTH] IN BLOOD BY AUTOMATED COUNT: 12.6 % (ref 11.5–14.5)
ERYTHROCYTE [SEDIMENTATION RATE] IN BLOOD BY WESTERGREN METHOD: 26 MM/HR (ref 0–36)
EST. GFR  (AFRICAN AMERICAN): >60 ML/MIN/1.73 M^2
EST. GFR  (NON AFRICAN AMERICAN): >60 ML/MIN/1.73 M^2
GLUCOSE SERPL-MCNC: 90 MG/DL (ref 70–110)
HCT VFR BLD AUTO: 36.5 % (ref 37–48.5)
HDLC SERPL-MCNC: 61 MG/DL (ref 40–75)
HDLC SERPL: 28.2 % (ref 20–50)
HGB BLD-MCNC: 11.8 G/DL (ref 12–16)
IMM GRANULOCYTES # BLD AUTO: 0.01 K/UL (ref 0–0.04)
IMM GRANULOCYTES NFR BLD AUTO: 0.3 % (ref 0–0.5)
IRON SERPL-MCNC: 63 UG/DL (ref 30–160)
LDLC SERPL CALC-MCNC: 140.2 MG/DL (ref 63–159)
LYMPHOCYTES # BLD AUTO: 1.1 K/UL (ref 1–4.8)
LYMPHOCYTES NFR BLD: 32.8 % (ref 18–48)
MCH RBC QN AUTO: 32.9 PG (ref 27–31)
MCHC RBC AUTO-ENTMCNC: 32.3 G/DL (ref 32–36)
MCV RBC AUTO: 102 FL (ref 82–98)
MONOCYTES # BLD AUTO: 0.3 K/UL (ref 0.3–1)
MONOCYTES NFR BLD: 8.3 % (ref 4–15)
NEUTROPHILS # BLD AUTO: 1.9 K/UL (ref 1.8–7.7)
NEUTROPHILS NFR BLD: 57.4 % (ref 38–73)
NONHDLC SERPL-MCNC: 155 MG/DL
NRBC BLD-RTO: 0 /100 WBC
PLATELET # BLD AUTO: 165 K/UL (ref 150–350)
PMV BLD AUTO: 11.1 FL (ref 9.2–12.9)
POTASSIUM SERPL-SCNC: 4.4 MMOL/L (ref 3.5–5.1)
PROT SERPL-MCNC: 7.5 G/DL (ref 6–8.4)
RBC # BLD AUTO: 3.59 M/UL (ref 4–5.4)
SATURATED IRON: 19 % (ref 20–50)
SODIUM SERPL-SCNC: 140 MMOL/L (ref 136–145)
TOTAL IRON BINDING CAPACITY: 324 UG/DL (ref 250–450)
TRANSFERRIN SERPL-MCNC: 219 MG/DL (ref 200–375)
TRIGL SERPL-MCNC: 74 MG/DL (ref 30–150)
WBC # BLD AUTO: 3.26 K/UL (ref 3.9–12.7)

## 2020-11-19 PROCEDURE — 85025 COMPLETE CBC W/AUTO DIFF WBC: CPT

## 2020-11-19 PROCEDURE — 83540 ASSAY OF IRON: CPT

## 2020-11-19 PROCEDURE — 80053 COMPREHEN METABOLIC PANEL: CPT

## 2020-11-19 PROCEDURE — 86038 ANTINUCLEAR ANTIBODIES: CPT

## 2020-11-19 PROCEDURE — 36415 COLL VENOUS BLD VENIPUNCTURE: CPT | Mod: PN

## 2020-11-19 PROCEDURE — 86140 C-REACTIVE PROTEIN: CPT

## 2020-11-19 PROCEDURE — 80061 LIPID PANEL: CPT

## 2020-11-19 PROCEDURE — 86431 RHEUMATOID FACTOR QUANT: CPT

## 2020-11-19 PROCEDURE — 85652 RBC SED RATE AUTOMATED: CPT

## 2020-11-20 LAB
ANA SER QL IF: NORMAL
RHEUMATOID FACT SERPL-ACNC: <10 IU/ML (ref 0–15)

## 2020-12-10 ENCOUNTER — HOSPITAL ENCOUNTER (OUTPATIENT)
Dept: RADIOLOGY | Facility: HOSPITAL | Age: 55
Discharge: HOME | End: 2020-12-10
Attending: FAMILY MEDICINE
Payer: OTHER GOVERNMENT

## 2020-12-10 ENCOUNTER — PATIENT OUTREACH (OUTPATIENT)
Dept: ADMINISTRATIVE | Facility: OTHER | Age: 55
End: 2020-12-10

## 2020-12-10 ENCOUNTER — OFFICE VISIT (OUTPATIENT)
Dept: OBSTETRICS AND GYNECOLOGY | Facility: CLINIC | Age: 55
End: 2020-12-10
Payer: OTHER GOVERNMENT

## 2020-12-10 VITALS
DIASTOLIC BLOOD PRESSURE: 70 MMHG | SYSTOLIC BLOOD PRESSURE: 102 MMHG | HEIGHT: 61 IN | BODY MASS INDEX: 32.92 KG/M2 | WEIGHT: 174.38 LBS

## 2020-12-10 VITALS — WEIGHT: 174.19 LBS | BODY MASS INDEX: 32.89 KG/M2 | HEIGHT: 61 IN

## 2020-12-10 DIAGNOSIS — M25.561 RECURRENT PAIN OF RIGHT KNEE: ICD-10-CM

## 2020-12-10 DIAGNOSIS — E66.9 OBESITY, CLASS II, BMI 35-39.9: ICD-10-CM

## 2020-12-10 DIAGNOSIS — R03.0 ELEVATED BP WITHOUT DIAGNOSIS OF HYPERTENSION: ICD-10-CM

## 2020-12-10 DIAGNOSIS — M25.50 ARTHRALGIA, UNSPECIFIED JOINT: ICD-10-CM

## 2020-12-10 DIAGNOSIS — N95.1 HOT FLASHES DUE TO MENOPAUSE: ICD-10-CM

## 2020-12-10 DIAGNOSIS — G47.00 INSOMNIA, UNSPECIFIED TYPE: ICD-10-CM

## 2020-12-10 DIAGNOSIS — Z01.419 WELL WOMAN EXAM WITH ROUTINE GYNECOLOGICAL EXAM: Primary | ICD-10-CM

## 2020-12-10 DIAGNOSIS — M25.561 RIGHT KNEE PAIN, UNSPECIFIED CHRONICITY: Primary | ICD-10-CM

## 2020-12-10 DIAGNOSIS — Z00.00 ROUTINE GENERAL MEDICAL EXAMINATION AT A HEALTH CARE FACILITY: ICD-10-CM

## 2020-12-10 DIAGNOSIS — G89.29 CHRONIC NONINTRACTABLE HEADACHE, UNSPECIFIED HEADACHE TYPE: ICD-10-CM

## 2020-12-10 DIAGNOSIS — R51.9 CHRONIC NONINTRACTABLE HEADACHE, UNSPECIFIED HEADACHE TYPE: ICD-10-CM

## 2020-12-10 PROCEDURE — 77063 BREAST TOMOSYNTHESIS BI: CPT | Mod: 26,,, | Performed by: RADIOLOGY

## 2020-12-10 PROCEDURE — 77067 SCR MAMMO BI INCL CAD: CPT | Mod: 26,,, | Performed by: RADIOLOGY

## 2020-12-10 PROCEDURE — 77063 MAMMO DIGITAL SCREENING BILAT WITH TOMO: ICD-10-PCS | Mod: 26,,, | Performed by: RADIOLOGY

## 2020-12-10 PROCEDURE — 87624 HPV HI-RISK TYP POOLED RSLT: CPT

## 2020-12-10 PROCEDURE — 99396 PR PREVENTIVE VISIT,EST,40-64: ICD-10-PCS | Mod: S$PBB,,, | Performed by: OBSTETRICS & GYNECOLOGY

## 2020-12-10 PROCEDURE — 77067 MAMMO DIGITAL SCREENING BILAT WITH TOMO: ICD-10-PCS | Mod: 26,,, | Performed by: RADIOLOGY

## 2020-12-10 PROCEDURE — 88175 CYTOPATH C/V AUTO FLUID REDO: CPT

## 2020-12-10 PROCEDURE — 99396 PREV VISIT EST AGE 40-64: CPT | Mod: S$PBB,,, | Performed by: OBSTETRICS & GYNECOLOGY

## 2020-12-10 PROCEDURE — 77067 SCR MAMMO BI INCL CAD: CPT | Mod: TC

## 2020-12-10 PROCEDURE — 99999 PR PBB SHADOW E&M-EST. PATIENT-LVL III: ICD-10-PCS | Mod: PBBFAC,,, | Performed by: OBSTETRICS & GYNECOLOGY

## 2020-12-10 PROCEDURE — 99213 OFFICE O/P EST LOW 20 MIN: CPT | Mod: PBBFAC | Performed by: OBSTETRICS & GYNECOLOGY

## 2020-12-10 PROCEDURE — 99999 PR PBB SHADOW E&M-EST. PATIENT-LVL III: CPT | Mod: PBBFAC,,, | Performed by: OBSTETRICS & GYNECOLOGY

## 2020-12-10 NOTE — PROGRESS NOTES
CHIEF COMPLAINT:   Gynecologic Exam  Chief Complaint   Patient presents with    Well Woman       HISTORY OF PRESENT ILLNESS  Tova Muñoz   presents for annual exam. The patient has no gyn complaints today.   Has recurring R knee pain and a click with movement that is preventing her from regular exercise.     Patient's last menstrual period was 2018.  Postmenopausal     GYN screening history: last Pap: was normal. Never had any abnormal Pap smears in past..    Reports no bowel movement irregularities from baseline, bloating, or weight loss.    HRT:   None   .    Health Maintenance   Topic Date Due    Mammogram  2020    Lipid Panel  2021    TETANUS VACCINE  2026    Hepatitis C Screening  Completed       HISTORY  Patient Active Problem List   Diagnosis    Seasonal allergies    Colon cancer screening    Obesity    Chronic nonintractable headache    Hot flashes due to menopause    Elevated BP without diagnosis of hypertension       Past Medical History:   Diagnosis Date    Allergy     BCC (basal cell carcinoma of skin)     nose    Migraine headache     Sinusitis        Past Surgical History:   Procedure Laterality Date    AUGMENTATION OF BREAST          BASAL CELL CARCINOMA EXCISION  2015    breast augumentation      BREAST SURGERY Bilateral     saline implants     SECTION      x2    COLONOSCOPY N/A 2016    Procedure: COLONOSCOPY;  Surgeon: Dali Rodriguez MD;  Location[facility];  Service: Endoscopy;  Laterality: N/A;    LASIX      SKIN BIOPSY      TUBAL LIGATION         Family History   Problem Relation Age of Onset    Asthma Mother     Diabetes Mother     Emphysema Mother     Hypertension Mother     Basal cell carcinoma Mother     Melanoma Mother     Asthma Sister     Cancer Sister         precancerous cervical cells removed    Basal cell carcinoma Sister     Heart disease Father 75        CHF    Cancer Paternal Grandmother          uterine    Eczema Neg Hx     Lupus Neg Hx     Psoriasis Neg Hx        Social History     Socioeconomic History    Marital status:      Spouse name: Not on file    Number of children: Not on file    Years of education: Not on file    Highest education level: Not on file   Occupational History    Occupation: Insurance    Social Needs    Financial resource strain: Not hard at all    Food insecurity     Worry: Never true     Inability: Never true    Transportation needs     Medical: No     Non-medical: No   Tobacco Use    Smoking status: Never Smoker    Smokeless tobacco: Never Used   Substance and Sexual Activity    Alcohol use: Yes     Alcohol/week: 2.0 standard drinks     Types: 2 Cans of beer per week     Frequency: 2-4 times a month     Drinks per session: 1 or 2     Binge frequency: Less than monthly    Drug use: No    Sexual activity: Yes     Birth control/protection: Surgical   Lifestyle    Physical activity     Days per week: 2 days     Minutes per session: 30 min    Stress: Very much   Relationships    Social connections     Talks on phone: More than three times a week     Gets together: Once a week     Attends Caodaism service: Not on file     Active member of club or organization: No     Attends meetings of clubs or organizations: Patient refused     Relationship status:    Other Topics Concern    Are you pregnant or think you may be? No    Breast-feeding No   Social History Narrative    Not on file       Current Outpatient Medications   Medication Sig Dispense Refill    citalopram (CELEXA) 10 MG tablet Take 1 tablet (10 mg total) by mouth once daily. 90 tablet 3    propranoloL (INDERAL) 60 MG tablet Take 1 tablet (60 mg total) by mouth every evening. For prevention of migraines. 90 tablet 3    cetirizine (ZYRTEC) 10 MG tablet Take 10 mg by mouth once daily.      fluticasone (FLONASE) 50 mcg/actuation nasal spray 2 sprays (100 mcg total) by Each Nare  "route once daily. (Patient not taking: Reported on 12/10/2020) 1 Bottle 11    hydroCHLOROthiazide (HYDRODIURIL) 12.5 MG Tab Take 1 tablet (12.5 mg total) by mouth daily as needed (edema). (Patient not taking: Reported on 12/10/2020) 30 tablet 1    montelukast (SINGULAIR) 10 mg tablet Take 1 tablet (10 mg total) by mouth every evening. (Patient not taking: Reported on 12/10/2020) 90 tablet 3    TREXIMET  mg Tab        No current facility-administered medications for this visit.        Review of patient's allergies indicates:   Allergen Reactions    Sulfa (sulfonamide antibiotics) Swelling           PHYSICAL EXAM     Vitals:    12/10/20 0743   BP: 102/70   Weight: 79.1 kg (174 lb 6.1 oz)   Height: 5' 1" (1.549 m)   PainSc: 0-No pain        PAIN SCALE: 0/10 None    ROS:  GENERAL: No fever, chills, fatigability or weight loss.  ABDOMEN: Appetite fine. No weight loss. Denies diarrhea, abdominal pain, hematemesis or blood in stool.  No change in bowel movement pattern.  URINARY: No flank pain, dysuria or hematuria.  REPRODUCTIVE: No abnormal vaginal bleeding.  BREASTS: Breasts symmetric, nontender and no lumps detected.    PE:   APPEARANCE: Well nourished, well developed, in no acute distress.  NECK: Neck symmetric without masses or thyromegaly.    NODES: No inguinal lymph node enlargement.  ABDOMEN: Soft. No tenderness or masses.  No hernias.  BREASTS: Symmetrical, no skin changes or visible lesions. No palpable masses, nipple discharge or adenopathy bilaterally.    PELVIC:   VULVA: No lesions. Normal female genitalia.  URETHRAL MEATUS: Normal size and location, no lesions, no prolapse.  URETHRA: No masses, tenderness, prolapse or scarring.  VAGINA: Moist and well rugated, no discharge, no significant cystocele or rectocele.  CERVIX: No lesions, normal diameter, no stenosis, no cervical motion tenderness.  UTERUS:6 week size, regular shape, mobile, non-tender, normal position, good support.  ADNEXA: No masses, " tenderness or CDS nodularity.  ANUS PERINEUM: No lesions, no relaxation, external hemorrhoids noted.        DIAGNOSIS:         1. Well woman exam with routine gynecological exam    2. Recurrent pain of right knee        PLAN:   Orders Placed This Encounter   Procedures    HPV High Risk Genotypes, PCR     Order Specific Question:   Source     Answer:   Cervix    Ambulatory referral/consult to Orthopedics     Standing Status:   Future     Standing Expiration Date:   1/10/2022     Referral Priority:   Routine     Referral Type:   Consultation     Requested Specialty:   Orthopedic Surgery     Number of Visits Requested:   1        MEDICATIONS PRESCRIBED:       Calcium vitamin D and weight bearing exercise reviewed    COUNSELING:  Patient was counseled today on A.C.S. Pap guidelines and recommendations for yearly pelvic exams, mammograms and monthly self breast exams; to see her PCP for other health maintenance.   Pt counseled on signs and symptoms of cancer of the pelvic organs including ovarian and uterine, and to alert myself and my office if pt has any vaginal bleeding, pelvic/abdominal pain, persistent bloating, unexplained constipation, unexplained appetite and/or weight loss.     FOLLOW-UP: With me in 1 year. Pap smear every 3 years.

## 2020-12-11 ENCOUNTER — OFFICE VISIT (OUTPATIENT)
Dept: ORTHOPEDICS | Facility: CLINIC | Age: 55
End: 2020-12-11
Payer: OTHER GOVERNMENT

## 2020-12-11 ENCOUNTER — HOSPITAL ENCOUNTER (OUTPATIENT)
Dept: RADIOLOGY | Facility: HOSPITAL | Age: 55
Discharge: HOME | End: 2020-12-11
Attending: STUDENT IN AN ORGANIZED HEALTH CARE EDUCATION/TRAINING PROGRAM
Payer: OTHER GOVERNMENT

## 2020-12-11 VITALS — WEIGHT: 174 LBS | HEIGHT: 61 IN | RESPIRATION RATE: 18 BRPM | BODY MASS INDEX: 32.85 KG/M2

## 2020-12-11 DIAGNOSIS — M25.561 RIGHT KNEE PAIN, UNSPECIFIED CHRONICITY: ICD-10-CM

## 2020-12-11 DIAGNOSIS — M17.11 PATELLOFEMORAL ARTHRITIS OF RIGHT KNEE: ICD-10-CM

## 2020-12-11 DIAGNOSIS — M25.561 RECURRENT PAIN OF RIGHT KNEE: Primary | ICD-10-CM

## 2020-12-11 PROCEDURE — 73564 X-RAY EXAM KNEE 4 OR MORE: CPT | Mod: 26,RT,, | Performed by: RADIOLOGY

## 2020-12-11 PROCEDURE — 73562 X-RAY EXAM OF KNEE 3: CPT | Mod: TC,LT,59

## 2020-12-11 PROCEDURE — 99214 OFFICE O/P EST MOD 30 MIN: CPT | Mod: PBBFAC,25 | Performed by: STUDENT IN AN ORGANIZED HEALTH CARE EDUCATION/TRAINING PROGRAM

## 2020-12-11 PROCEDURE — 73562 X-RAY EXAM OF KNEE 3: CPT | Mod: 26,LT,, | Performed by: RADIOLOGY

## 2020-12-11 PROCEDURE — 99203 PR OFFICE/OUTPT VISIT, NEW, LEVL III, 30-44 MIN: ICD-10-PCS | Mod: S$PBB,,, | Performed by: STUDENT IN AN ORGANIZED HEALTH CARE EDUCATION/TRAINING PROGRAM

## 2020-12-11 PROCEDURE — 73562 XR KNEE ORTHO RIGHT WITH FLEXION: ICD-10-PCS | Mod: 26,LT,, | Performed by: RADIOLOGY

## 2020-12-11 PROCEDURE — 99999 PR PBB SHADOW E&M-EST. PATIENT-LVL IV: CPT | Mod: PBBFAC,,, | Performed by: STUDENT IN AN ORGANIZED HEALTH CARE EDUCATION/TRAINING PROGRAM

## 2020-12-11 PROCEDURE — 99999 PR PBB SHADOW E&M-EST. PATIENT-LVL IV: ICD-10-PCS | Mod: PBBFAC,,, | Performed by: STUDENT IN AN ORGANIZED HEALTH CARE EDUCATION/TRAINING PROGRAM

## 2020-12-11 PROCEDURE — 73564 XR KNEE ORTHO RIGHT WITH FLEXION: ICD-10-PCS | Mod: 26,RT,, | Performed by: RADIOLOGY

## 2020-12-11 PROCEDURE — 99203 OFFICE O/P NEW LOW 30 MIN: CPT | Mod: S$PBB,,, | Performed by: STUDENT IN AN ORGANIZED HEALTH CARE EDUCATION/TRAINING PROGRAM

## 2020-12-11 NOTE — PROGRESS NOTES
Patient ID: Tova Muñoz  YOB: 1965  MRN: [id number]    Chief Complaint: Pain and Injury of the Right Knee    Referred By: Dr. Pedrito Richard  for right knee pain /injury     History of Present Illness: Tova Muñoz is a right-hand dominant 55 y.o. female who presents today with right knee pain/injury. She fell walking into closet to obtain clothes then 2 weeks after initial fall, fell again off a stool at a friend house.      It has been present for 2 months.   Pain is located in the right knee(s).   The pain is described as constant.  The symptoms are worse withmovement, walking, standing  The patient has tried OTC pain medications (Aleve, Ibuprofen ) for pain, with minimal relief.   Related to injury: yes.    The patient is active in none.  Occupation: Payroll      This a pleasant 55-year-old female today presenting with subacute onset of right knee pain.  She was previously active in as a trauma, but has had some on and off pain in the right knee special with weight-bearing and walking up and down steps.  She denies any significant twisting injuries or instability, but notes acute trip and fall on the right knee over the last few months.  Notes some clicking sensations at a nonpainful, denies any locking.  She has tried some over-the-counter medicine including Aleve and ibuprofen for with some relief taking 400 mg to 600 mg a day.  She has had chronic pain in the left knee as well that she has been seen for previously.  She has tried physical therapy years ago with good results for the other leg.    Past Medical History:   Past Medical History:   Diagnosis Date    Allergy     BCC (basal cell carcinoma of skin)     nose    Colon polyp     Migraine headache     Sinusitis      Past Surgical History:   Procedure Laterality Date    AUGMENTATION OF BREAST      1994    BASAL CELL CARCINOMA EXCISION  7/2015    breast augumentation      BREAST SURGERY Bilateral 1989     saline implants     SECTION      x2    COLONOSCOPY N/A 2016    Procedure: COLONOSCOPY;  Surgeon: Dali Rodriguez MD;  Location: [facility];  Service: Endoscopy;  Laterality: N/A;    LASIX      SKIN BIOPSY      TUBAL LIGATION       Family History   Problem Relation Age of Onset    Asthma Mother     Diabetes Mother     Emphysema Mother     Hypertension Mother     Basal cell carcinoma Mother     Melanoma Mother     Asthma Sister     Cancer Sister         precancerous cervical cells removed    Basal cell carcinoma Sister     Heart disease Father 75        CHF    Cancer Paternal Grandmother         uterine    Ovarian cancer Paternal Grandmother     Eczema Neg Hx     Lupus Neg Hx     Psoriasis Neg Hx      Social History     Socioeconomic History    Marital status:      Spouse name: Not on file    Number of children: Not on file    Years of education: Not on file    Highest education level: Not on file   Occupational History    Occupation: Insurance    Social Needs    Financial resource strain: Not hard at all    Food insecurity     Worry: Never true     Inability: Never true    Transportation needs     Medical: No     Non-medical: No   Tobacco Use    Smoking status: Never Smoker    Smokeless tobacco: Never Used   Substance and Sexual Activity    Alcohol use: Yes     Alcohol/week: 2.0 standard drinks     Types: 2 Cans of beer per week     Frequency: 2-4 times a month     Drinks per session: 1 or 2     Binge frequency: Less than monthly    Drug use: No    Sexual activity: Yes     Birth control/protection: Surgical   Lifestyle    Physical activity     Days per week: 2 days     Minutes per session: 30 min    Stress: Very much   Relationships    Social connections     Talks on phone: More than three times a week     Gets together: Once a week     Attends Anabaptist service: Not on file     Active member of club or organization: No     Attends meetings of clubs or  organizations: Patient refused     Relationship status:    Other Topics Concern    Are you pregnant or think you may be? No    Breast-feeding No   Social History Narrative    Not on file     Medication List with Changes/Refills   Current Medications    CETIRIZINE (ZYRTEC) 10 MG TABLET    Take 10 mg by mouth once daily.    CITALOPRAM (CELEXA) 10 MG TABLET    Take 1 tablet (10 mg total) by mouth once daily.    FLUTICASONE (FLONASE) 50 MCG/ACTUATION NASAL SPRAY    2 sprays (100 mcg total) by Each Nare route once daily.    HYDROCHLOROTHIAZIDE (HYDRODIURIL) 12.5 MG TAB    Take 1 tablet (12.5 mg total) by mouth daily as needed (edema).    MONTELUKAST (SINGULAIR) 10 MG TABLET    Take 1 tablet (10 mg total) by mouth every evening.    PROPRANOLOL (INDERAL) 60 MG TABLET    Take 1 tablet (60 mg total) by mouth every evening. For prevention of migraines.    TREXIMET  MG TAB         Review of patient's allergies indicates:   Allergen Reactions    Sulfa (sulfonamide antibiotics) Swelling     Review of Systems   Constitution: Negative for chills and fever.   HENT: Negative for congestion.    Eyes: Negative for photophobia.   Cardiovascular: Negative for chest pain and leg swelling.   Respiratory: Negative for shortness of breath.    Musculoskeletal: Positive for joint pain, joint swelling, muscle cramps and muscle weakness. Negative for falls and myalgias.   Gastrointestinal: Negative for abdominal pain, constipation and diarrhea.   Neurological: Positive for loss of balance. Negative for headaches, numbness and weakness.       Physical Exam:   Body mass index is 32.88 kg/m².    GENERAL: Well appearing, in no acute distress.  HEAD: Normocephalic and atraumatic.  ENT: External ears and nose grossly normal.  EYES: EOMI bilaterally  PULMONARY: Respirations are grossly even and non-labored.  NEURO: Awake, alert, and oriented x 3.  SKIN: No obvious rashes appreciated.  PSYCH: Mood & affect are appropriate.    Detailed  MSK exam:     Mild pes planus  Mild valgus alignment  Positive Trendelenburg bilaterally    Right knee exam  Range of motion 0/0/125, symmetric  Good patellar mobility in all quadrants with nonpainful clicking with medial to lateral movement.  No effusion  Tenderness to palpation in the medial joint line as well as the medial patellar facet and medial femoral condyle  Crepitance with active range of motion, no improvement with medial force of the patella with active range  Ligaments grossly intact  Equivocal Demetrice's with lateral compression causing medial condyle pain at the interface with the patella    Imaging:    Narrative & Impression     EXAMINATION:  XR KNEE ORTHO RIGHT WITH FLEXION     CLINICAL HISTORY:  Pain in right knee     TECHNIQUE:  AP standing views of both knees, AP flexion views of both knees, lateral view of the right knee and Merchant views of both knees     COMPARISON:  09/25/2016     FINDINGS:  There is mild medial compartment joint space narrowing involving either knee.  No significant degenerative change at the right patellofemoral compartment.  No joint effusion.  No acute fracture or dislocation.     Impression:     1.  As above        Electronically signed by: Iron Barton DO  Date:                                            12/11/2020  Time:                                           08:54       Relevant imaging results were reviewed and interpreted by me and per my read mild medial compartment narrowing bilaterally, there is more significant patellar tilt and lateral subluxation noted on the right compared to left with more significant osteophytic changes noted in the medial patellofemoral joint on sunrise view.  This was discussed with the patient and / or family today.     Assessment:  Tova Muñoz is a 55 y.o. female presenting today with right medial knee pain consistent with some patellofemoral arthritis.  She has significant glute weakness on exam and I think she would benefit  from a good trial of repeat physical therapy focusing on her core and her hips to assist with some of her patellofemoral glide.  We also discussed getting into a brace and I do not think that a Darnell Pull would be as beneficial as she is having most of her symptoms over the medial patellar facet and medial femoral condyle interface.  I would like to put her into a more compressive patellofemoral brace that she feels more comfortable in with activity.  We discussed continuing anti-inflammatories over the next few weeks and she is going to continue with ibuprofen with food.  We also discuss getting into some more comfortable stiff-soled shoes including new balance or Edgar that she has a home as well as considering an over-the-counter insert for her mild pes planus.    Follow-up in 8 weeks    Recurrent pain of right knee  -     Ambulatory referral/consult to Orthopedics  -     Ambulatory referral/consult to Physical/Occupational Therapy; Future; Expected date: 12/18/2020    Patellofemoral arthritis of right knee         A copy of today's visit note has been sent to the referring provider.     Tomi Lanza MD    Disclaimer: This note was prepared using a voice recognition system and is likely to have sound alike errors within the text.

## 2020-12-11 NOTE — LETTER
December 11, 2020      Chata Richard MD  [address] [facility] [address]           [facility]  [address] [facility] [address]  Phone: [phone]  Fax: [phone]          Patient: Tova Muñoz   MR Number: [id number]   YOB: 1965   Date of Visit: 12/11/2020       Dear Dr. Chata Richard:    Thank you for referring Tova Muñoz to me for evaluation. Attached you will find relevant portions of my assessment and plan of care.    If you have questions, please do not hesitate to call me. I look forward to following Tova Muñoz along with you.    Sincerely,    Tomi Lanza MD    Enclosure  CC:  No Recipients    If you would like to receive this communication electronically, please contact [email or web address] or [phone] to request more information on [employer] access.    For providers and/or their staff who would like to refer a patient to [facility], please contact us through our one-stop-shop provider referral line, [facility], at [phone].    If you feel you have received this communication in error or would no longer like to receive these types of communications, please e-mail [email or web address]

## 2020-12-11 NOTE — PATIENT INSTRUCTIONS
Please try some over the counter inserts for your shoes, make sure they are comfortable and wear stiff sole shoes with good heel cups, ie Edgar, New Balance.      Apply topical diclofenac (Voltaren) up to 4 times a day to the affected area.  It can be bought over the counter at any local pharmacy.

## 2020-12-16 ENCOUNTER — CLINICAL SUPPORT (OUTPATIENT)
Dept: REHABILITATION | Facility: HOSPITAL | Age: 55
End: 2020-12-16
Attending: STUDENT IN AN ORGANIZED HEALTH CARE EDUCATION/TRAINING PROGRAM
Payer: OTHER GOVERNMENT

## 2020-12-16 DIAGNOSIS — M25.561 RECURRENT PAIN OF RIGHT KNEE: ICD-10-CM

## 2020-12-16 DIAGNOSIS — R29.898 WEAKNESS OF RIGHT LOWER EXTREMITY: ICD-10-CM

## 2020-12-16 DIAGNOSIS — Z78.9 DIFFICULTY NAVIGATING STAIRS: ICD-10-CM

## 2020-12-16 DIAGNOSIS — R29.898 WEAKNESS OF BOTH HIPS: Primary | ICD-10-CM

## 2020-12-16 PROCEDURE — 97161 PT EVAL LOW COMPLEX 20 MIN: CPT

## 2020-12-16 PROCEDURE — 97110 THERAPEUTIC EXERCISES: CPT

## 2020-12-16 NOTE — PLAN OF CARE
[facility]  Physical Therapy Initial Evaluation    Name: Tova Muñoz  Clinic Number: [id number]    Therapy Diagnosis:   Encounter Diagnosis   Name Primary?    Recurrent pain of right knee    Weakness of both hips (R29.898), Difficulty navigating stairs (Z78.9), Weakness of right lower extremity (R29.898)    Physician: Tomi Lanza MD    Physician Orders: PT Eval and Treat  Medical Diagnosis from Referral: M25.561 (ICD-10-CM) - Recurrent pain of right knee   Evaluation Date: 2020  Authorization Period Expiration: 2020  Plan of Care Expiration: 3/31/2021  Visit # / Visits authorized:     Time In: 1615  Time Out: 1710  Total Billable Time: 55 minutes    Precautions: Standard    Subjective   Date of onset: several years ago; most recently over the past couple of months  History of current condition - Tova reports: that she's been having on and off R knee pain for the better part of the past decade. Reports most recent exacerbation started a few weeks back after she experienced a fall directly on the anterior aspect of the R knee. Pt reports symptoms have improved some, but have yet to return to baseline. Previously a runner up to half marathon distances, more recently power walking 3-4 miles a day over past couple of years. Pt does report a history of L-sided ankle and tibial stresses fractures several years ago. Pt works a desk job and does note some issues with maintaining seated position for extended periods of time.     Medical History:   Past Medical History:   Diagnosis Date    Allergy     BCC (basal cell carcinoma of skin)     nose    Colon polyp     Migraine headache     Sinusitis        Surgical History:   Tova Muñoz  has a past surgical history that includes  section; breast augumentation; Tubal ligation; Excision basal cell carcinoma (2015); Skin biopsy; LASIX; Colonoscopy (N/A, 2016); Breast surgery (Bilateral, ); and  Augmentation of breast.    Medications:   Tova has a current medication list which includes the following prescription(s): cetirizine, citalopram, fluticasone propionate, hydrochlorothiazide, montelukast, propranolol, and treximet.    Allergies:   Review of patient's allergies indicates:   Allergen Reactions    Sulfa (sulfonamide antibiotics) Swelling        Imaging, x-ray: There is mild medial compartment joint space narrowing involving either knee. No significant degenerative change at the right patellofemoral compartment. No joint effusion. No acute fracture or dislocation.     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; decreased tolerance to prolonged knee flexion, has stool under desk that she extends leg on  Prior Level of Function: previously walking several miles per day; previously running a few years ago, half marathon in 2015  Current Level of Function: unable to walk or run for exercise; decreased community ambulation 2/2 symptoms    Pain:  Current 2/10, worst 8/10, best 2/10   Location: right knee   Description: Aching, Dull and Throbbing  Aggravating Factors: Sitting, Standing, Walking, running, stairs  Easing Factors: Ibuprofen/Aleve, cushioned shoes    Pts goals: to return to exercise walking most days per week and jogging as able    Objective     Observation: no acute distress    Posture: some valgus with external rotation of lower leg/foot in stance; mild contralateral hip drop during stance w/ normal gait    Joint Mobility: WNL at tibiofemoral joint bilaterally    Palpation: no TTP throughout R knee at this time    Sensation: intact and symmetrical    Flexibility: rectus/quadriceps tightness bilaterally, R > L; SLR to 70    Edema: none appreciated    Range of Motion:   Knee Left active Left Passive Right Active R passive   Flexion 125 125 125 125   Extension +3 hyper  "+3 +3 +3     Lower Extremity Strength  Left LE  Right LE    Knee extension: 5/5 Knee extension: 4+/5   Knee flexion: 4/5 Knee flexion: 4/5   Hip flexion: 4/5 Hip flexion: 4/5   Hip extension:  3+/5 Hip extension: 3+/5   Hip abduction: 3+/5 Hip abduction: 3+/5   Hip ER: 3+/5 Hip ER 3+/5   Hip IR: 4/5 Hip IR: 4/5     Special Tests:   Right   Valgus Stress Test -   Varus Stress test -   Darnell's Test -   Patellar Grind Test -     Step down test: + bilaterally    Adithya test: (+) bilaterally for rectus tightness; 50 deg knee flexion angle    Function:  - SL squat L: decreased depth, increased dynamic valgus  - SL squat R: "  - BW Squat: notable valgus collapse w/ quadriceps strategy, significantly reduced depth   - SL balance (L): 30" w/ mild sway EO; <5" EC  - SL balance (R): "    CMS Impairment/Limitation/Restriction for FOTO Knee Survey    Therapist reviewed FOTO scores for Tova Muñoz on 12/16/2020.   FOTO documents entered into [employer] - see Media section.    Limitation Score: 61%  Category: Mobility       TREATMENT   Treatment Time In: 1655  Treatment Time Out: 1705  Total Treatment time separate from Evaluation: 10 minutes    Tova received therapeutic exercises to develop strength, endurance, ROM, flexibility, posture and core stabilization for 10 minutes including:  Box squat 2x10 (14") GTB around knees to cue hip ER  Sidelying clam GTB 15x5" ea    Home Exercises and Patient Education Provided    Education provided:   - Role of PT, PT POC, potential correlation between hip weakness/mechanical compensations and knee symptoms, role of hip strengthening/re-ed    Written Home Exercises Provided: yes.  Exercises were reviewed and Tova was able to demonstrate them prior to the end of the session.  Tova demonstrated good  understanding of the education provided.     See EMR under Patient Instructions for exercises provided 12/16/2020.    Assessment   Tova is a 55 y.o. female referred to outpatient physical therapy " with a medical diagnosis of recurrent R knee pain. Pt presents today w/ significant bilateral hip weakness of the abductor and deep rotator groups, hip extensor weakness, altered functional movement patterns, and decreased tolerance to knee extensor loading consistent w/ R knee overuse 2/2 aforementioned mechanical faults. It seems likely that with the patient's exercise history and goals, she has likely been over-stressing her knee due to proximal weakness and resultant compensatory mechanics. She should benefit from skilled therapy in order to initiate a robust hip and core strengthening program in addition to mechanical retraining for better mechanics with functional patterns and power walking/running activity.    Pt prognosis is Good.   Pt will benefit from skilled outpatient physical therapy to address the deficits listed above and in the chart below, provide pt/family education, and to maximize pt's level of independence.     Plan of care discussed with patient: Yes  Pt's spiritual, cultural and educational needs considered and patient is agreeable to the plan of care and goals as stated below:     Anticipated Barriers for therapy: Covid-19    Medical Necessity is demonstrated by the following  History  Co-morbidities and personal factors that may impact the plan of care Co-morbidities:   none    Personal Factors:   lifestyle     low   Examination  Body Structures and Functions, activity limitations and participation restrictions that may impact the plan of care Body Regions:   lower extremities    Body Systems:    gross symmetry  strength  balance  gait  motor control    Participation Restrictions:   Walking, running, sitting, stairs, squatting    Activity limitations:   Learning and applying knowledge  no deficits    General Tasks and Commands  no deficits    Communication  no deficits    Mobility  lifting and carrying objects  walking    Self care  no deficits    Domestic Life  shopping  doing house work  (cleaning house, washing dishes, laundry)    Interactions/Relationships  no deficits    Life Areas  no deficits    Community and Social Life  no deficits         low   Clinical Presentation stable and uncomplicated low   Decision Making/ Complexity Score: low     Goals:  Short Term Goals (4-6 Weeks):   1) Pt will demonstrate compliance with initial home exercise program as prescribed by physical therapist to improve independence with management of condition.  2) Pt to demonstrate improvements in hip MMT scores grossly to at least 4/5 to allow for improved pelvis and knee control with dynamic movements.  3) Pt will be able to demonstrate 10 body weight squats without evidence of dynamic valgus mechanism for improved knee tracking and mechanics.    Long Term Goals (10-12 Weeks):   1) Pt to achieve <30% limitation as measured by the FOTO to demonstrate decreased disability.  2) Pt to demonstrate improvements in hip MMT scores grossly to at least 4+/5 to allow for improved pelvis and knee control with dynamic movements.  3) Pt will be able to demonstrate 10 single leg squats on involved limb without evidence of dynamic valgus mechanism for improved knee tracking and mechanics.  4) Pt will tolerate power walking at least 3 miles on 3 days per week to demonstrate ability to return to previous exercise activity without limitation.    Plan   Plan of care Certification: 12/16/2020 to 3/31/2020.    Outpatient Physical Therapy 1 times weekly for 10 weeks to include the following interventions: Manual Therapy, Moist Heat/ Ice, Neuromuscular Re-ed, Patient Education, Self Care, Therapeutic Activites and Therapeutic Exercise.     Fatimah Cerda, PT

## 2020-12-16 NOTE — PROGRESS NOTES
Please see the below listed initial evaluation and POC. Thank you for your consult.    Fatimah Cerda, PT, DPT

## 2020-12-17 ENCOUNTER — PATIENT MESSAGE (OUTPATIENT)
Dept: REHABILITATION | Facility: HOSPITAL | Age: 55
End: 2020-12-17

## 2020-12-17 LAB
HPV HR 12 DNA SPEC QL NAA+PROBE: NEGATIVE
HPV16 AG SPEC QL: NEGATIVE
HPV18 DNA SPEC QL NAA+PROBE: NEGATIVE

## 2020-12-22 NOTE — PROGRESS NOTES
"Physical Therapy Daily Treatment Note     Name: Tova Muñoz  Clinic Number: [id number]    Therapy Diagnosis:   Encounter Diagnoses   Name Primary?    Weakness of both hips     Difficulty navigating stairs     Weakness of right lower extremity      Physician: Tomi Lanza MD    Visit Date: 12/23/2020  Physician Orders: PT Eval and Treat  Medical Diagnosis from Referral: M25.561 (ICD-10-CM) - Recurrent pain of right knee   Evaluation Date: 12/16/2020  Authorization Period Expiration: 12/31/2020  Plan of Care Expiration: 3/31/2021  Visit # / Visits authorized: 2/12    Time In: 0756  Time Out: 0845  Total Billable Time: 49 minutes    Precautions: Standard    Subjective     Pt reports: she's been feeling pretty good, just with some muscle soreness after the eval. Didn't have a chance to work on HEP.  She was not compliant with home exercise program.  Response to previous treatment: no adverse effects  Functional change: too soon to tell    Pain: 0/10  Location: right knee      Objective     Tova received therapeutic exercises to develop strength, endurance and core stabilization for 49 minutes including:  Upright bike level 3.0 x5' for R knee AAROM and conditioning  Lateral band walk GTB 3 laps  Goblet box squat 2x10 (NW), 2x8 (10#) GTB around knees to cue hip ER 14" box  Shuttle SL press 2x10 ea 3 straps  Supine DL bridge GTB around knees 2x15 (5")  Sidelying clam GTB 2x15 (5") ea    Home Exercises Provided and Patient Education Provided     Education provided:   - Role of PT, PT POC, potential correlation between hip weakness/mechanical compensations and knee symptoms, role of hip strengthening/re-ed    Written Home Exercises Provided: Patient instructed to cont prior HEP.  Exercises were reviewed and Tova was able to demonstrate them prior to the end of the session.  Tova demonstrated good  understanding of the education provided.     See EMR under Patient Instructions for exercises provided prior " visit.    Assessment     Progressing well, able to initiate some foundational loading work today without issues. Some notable deficits in work capacity evident with patient fatiguing quickly with increased volume. Deep hip rotator and abductor weakness evident throughout session, but does respond fairly well to external cueing for increased recruitment.    Tova is progressing well towards her goals.   Pt prognosis is Good.     Pt will continue to benefit from skilled outpatient physical therapy to address the deficits listed in the problem list box on initial evaluation, provide pt/family education and to maximize pt's level of independence in the home and community environment.     Pt's spiritual, cultural and educational needs considered and pt agreeable to plan of care and goals.    Anticipated barriers to physical therapy: Covid-19    Goals:   Short Term Goals (4-6 Weeks): (progressing, not met)  1) Pt will demonstrate compliance with initial home exercise program as prescribed by physical therapist to improve independence with management of condition.  2) Pt to demonstrate improvements in hip MMT scores grossly to at least 4/5 to allow for improved pelvis and knee control with dynamic movements.  3) Pt will be able to demonstrate 10 body weight squats without evidence of dynamic valgus mechanism for improved knee tracking and mechanics.     Long Term Goals (10-12 Weeks): (progressing, not met)  1) Pt to achieve <30% limitation as measured by the FOTO to demonstrate decreased disability.  2) Pt to demonstrate improvements in hip MMT scores grossly to at least 4+/5 to allow for improved pelvis and knee control with dynamic movements.  3) Pt will be able to demonstrate 10 single leg squats on involved limb without evidence of dynamic valgus mechanism for improved knee tracking and mechanics.  4) Pt will tolerate power walking at least 3 miles on 3 days per week to demonstrate ability to return to previous exercise  activity without limitation.    Plan     Continue w/ current POC emphasizing progressive lower quarter resistance training with an emphasis on knee extensor load tolerance and hip abductor/deep external rotator work    Plan of care Certification: 12/16/2020 to 3/31/2020.  Outpatient Physical Therapy 1 times weekly for 10 weeks to include the following interventions: Manual Therapy, Moist Heat/ Ice, Neuromuscular Re-ed, Patient Education, Self Care, Therapeutic Activites and Therapeutic Exercise.     Fatimah Cerda PT

## 2020-12-23 ENCOUNTER — CLINICAL SUPPORT (OUTPATIENT)
Dept: REHABILITATION | Facility: HOSPITAL | Age: 55
End: 2020-12-23
Attending: STUDENT IN AN ORGANIZED HEALTH CARE EDUCATION/TRAINING PROGRAM
Payer: OTHER GOVERNMENT

## 2020-12-23 DIAGNOSIS — Z78.9 DIFFICULTY NAVIGATING STAIRS: ICD-10-CM

## 2020-12-23 DIAGNOSIS — R29.898 WEAKNESS OF RIGHT LOWER EXTREMITY: ICD-10-CM

## 2020-12-23 DIAGNOSIS — R29.898 WEAKNESS OF BOTH HIPS: ICD-10-CM

## 2020-12-23 PROCEDURE — 97110 THERAPEUTIC EXERCISES: CPT

## 2020-12-31 ENCOUNTER — CLINICAL SUPPORT (OUTPATIENT)
Dept: REHABILITATION | Facility: HOSPITAL | Age: 55
End: 2020-12-31
Payer: OTHER GOVERNMENT

## 2020-12-31 DIAGNOSIS — R29.898 WEAKNESS OF RIGHT LOWER EXTREMITY: ICD-10-CM

## 2020-12-31 DIAGNOSIS — R29.898 WEAKNESS OF BOTH HIPS: ICD-10-CM

## 2020-12-31 DIAGNOSIS — Z78.9 DIFFICULTY NAVIGATING STAIRS: ICD-10-CM

## 2020-12-31 PROCEDURE — 97110 THERAPEUTIC EXERCISES: CPT

## 2020-12-31 NOTE — PROGRESS NOTES
"Physical Therapy Daily Treatment Note     Name: Tova Muñoz  Clinic Number: [id number]    Therapy Diagnosis:   Encounter Diagnoses   Name Primary?    Weakness of both hips     Difficulty navigating stairs     Weakness of right lower extremity      Physician: Tomi Lanza MD    Visit Date: 12/31/2020  Physician Orders: PT Eval and Treat  Medical Diagnosis from Referral: M25.561 (ICD-10-CM) - Recurrent pain of right knee   Evaluation Date: 12/16/2020  Authorization Period Expiration: 12/31/2020  Plan of Care Expiration: 3/31/2021  Visit # / Visits authorized: 3/12    Time In: 1620  Time Out: 1700  Total Billable Time: 40 minutes    Precautions: Standard    Subjective     Pt reports: doing well. Sore for a couple of days after last session, but no issues. Didn't have time for HEP.  She was not compliant with home exercise program.  Response to previous treatment: no adverse effects  Functional change: improving symptoms with day to day activity    Pain: 0/10  Location: right knee      Objective     Tova received therapeutic exercises to develop strength, endurance and core stabilization for 40 minutes including:  Upright bike level 3.0 x5' for R knee AAROM and conditioning  Lateral band walk GTB 3 laps  Low box squat GTB around knees 4x10  Box step up/down fwd 3x12 ea 6" VC for eccentric control  Shuttle SL press 2x12 ea 4 straps  Supine DL bridge GTB around knees 2x15 (5") -NT  Sidelying clam GTB 2x15 (5") ea    Home Exercises Provided and Patient Education Provided     Education provided:   - Role of PT, PT POC, potential correlation between hip weakness/mechanical compensations and knee symptoms, role of hip strengthening/re-ed    Written Home Exercises Provided: Patient instructed to cont prior HEP.  Exercises were reviewed and Tova was able to demonstrate them prior to the end of the session.  Tova demonstrated good  understanding of the education provided.     See EMR under Patient Instructions for " exercises provided prior visit.    Assessment     Continues w/ some good clinical improvement despite relative non-compliance with HEP. Still struggles w/ incorporation of lateral hip musculature with squatting and step up tasks, but improving some w/ repetition and external cueing. Emphasized role of HEP for accelerated progression.    Tova is progressing well towards her goals.   Pt prognosis is Good.     Pt will continue to benefit from skilled outpatient physical therapy to address the deficits listed in the problem list box on initial evaluation, provide pt/family education and to maximize pt's level of independence in the home and community environment.     Pt's spiritual, cultural and educational needs considered and pt agreeable to plan of care and goals.    Anticipated barriers to physical therapy: Covid-19    Goals:   Short Term Goals (4-6 Weeks): (progressing, not met)  1) Pt will demonstrate compliance with initial home exercise program as prescribed by physical therapist to improve independence with management of condition.  2) Pt to demonstrate improvements in hip MMT scores grossly to at least 4/5 to allow for improved pelvis and knee control with dynamic movements.  3) Pt will be able to demonstrate 10 body weight squats without evidence of dynamic valgus mechanism for improved knee tracking and mechanics.     Long Term Goals (10-12 Weeks): (progressing, not met)  1) Pt to achieve <30% limitation as measured by the FOTO to demonstrate decreased disability.  2) Pt to demonstrate improvements in hip MMT scores grossly to at least 4+/5 to allow for improved pelvis and knee control with dynamic movements.  3) Pt will be able to demonstrate 10 single leg squats on involved limb without evidence of dynamic valgus mechanism for improved knee tracking and mechanics.  4) Pt will tolerate power walking at least 3 miles on 3 days per week to demonstrate ability to return to previous exercise activity without  limitation.    Plan     Continue w/ current POC emphasizing progressive lower quarter resistance training with an emphasis on knee extensor load tolerance and hip abductor/deep external rotator work    Plan of care Certification: 12/16/2020 to 3/31/2020.  Outpatient Physical Therapy 1 times weekly for 10 weeks to include the following interventions: Manual Therapy, Moist Heat/ Ice, Neuromuscular Re-ed, Patient Education, Self Care, Therapeutic Activites and Therapeutic Exercise.     Fatimah Cerda PT

## 2021-01-06 ENCOUNTER — CLINICAL SUPPORT (OUTPATIENT)
Dept: REHABILITATION | Facility: HOSPITAL | Age: 56
End: 2021-01-06
Payer: OTHER GOVERNMENT

## 2021-01-06 DIAGNOSIS — R29.898 WEAKNESS OF RIGHT LOWER EXTREMITY: ICD-10-CM

## 2021-01-06 DIAGNOSIS — Z78.9 DIFFICULTY NAVIGATING STAIRS: ICD-10-CM

## 2021-01-06 DIAGNOSIS — R29.898 WEAKNESS OF BOTH HIPS: ICD-10-CM

## 2021-01-06 PROCEDURE — 97110 THERAPEUTIC EXERCISES: CPT

## 2021-01-11 ENCOUNTER — OFFICE VISIT (OUTPATIENT)
Dept: PRIMARY CARE CLINIC | Facility: CLINIC | Age: 56
End: 2021-01-11
Payer: OTHER GOVERNMENT

## 2021-01-11 VITALS
DIASTOLIC BLOOD PRESSURE: 78 MMHG | HEIGHT: 61 IN | WEIGHT: 170.38 LBS | BODY MASS INDEX: 32.17 KG/M2 | SYSTOLIC BLOOD PRESSURE: 102 MMHG

## 2021-01-11 DIAGNOSIS — R51.9 CHRONIC NONINTRACTABLE HEADACHE, UNSPECIFIED HEADACHE TYPE: ICD-10-CM

## 2021-01-11 DIAGNOSIS — I10 ESSENTIAL HYPERTENSION: Primary | ICD-10-CM

## 2021-01-11 DIAGNOSIS — E66.9 CLASS 1 OBESITY WITH BODY MASS INDEX (BMI) OF 32.0 TO 32.9 IN ADULT, UNSPECIFIED OBESITY TYPE, UNSPECIFIED WHETHER SERIOUS COMORBIDITY PRESENT: ICD-10-CM

## 2021-01-11 DIAGNOSIS — G89.29 CHRONIC NONINTRACTABLE HEADACHE, UNSPECIFIED HEADACHE TYPE: ICD-10-CM

## 2021-01-11 DIAGNOSIS — J30.2 SEASONAL ALLERGIES: Chronic | ICD-10-CM

## 2021-01-11 PROCEDURE — 99214 PR OFFICE/OUTPT VISIT, EST, LEVL IV, 30-39 MIN: ICD-10-PCS | Mod: 95,,, | Performed by: FAMILY MEDICINE

## 2021-01-11 PROCEDURE — 99214 OFFICE O/P EST MOD 30 MIN: CPT | Mod: 95,,, | Performed by: FAMILY MEDICINE

## 2021-01-18 ENCOUNTER — OFFICE VISIT (OUTPATIENT)
Dept: URGENT CARE | Facility: CLINIC | Age: 56
End: 2021-01-18
Payer: OTHER GOVERNMENT

## 2021-01-18 ENCOUNTER — PATIENT MESSAGE (OUTPATIENT)
Dept: REHABILITATION | Facility: HOSPITAL | Age: 56
End: 2021-01-18

## 2021-01-18 VITALS
HEIGHT: 61 IN | SYSTOLIC BLOOD PRESSURE: 131 MMHG | WEIGHT: 170 LBS | BODY MASS INDEX: 32.1 KG/M2 | TEMPERATURE: 98 F | DIASTOLIC BLOOD PRESSURE: 76 MMHG | RESPIRATION RATE: 18 BRPM | HEART RATE: 59 BPM | OXYGEN SATURATION: 98 %

## 2021-01-18 DIAGNOSIS — U07.1 COVID-19 VIRUS INFECTION: Primary | ICD-10-CM

## 2021-01-18 DIAGNOSIS — U07.1 COVID-19 VIRUS DETECTED: ICD-10-CM

## 2021-01-18 DIAGNOSIS — Z11.9 ENCOUNTER FOR SCREENING EXAMINATION FOR INFECTIOUS DISEASE: ICD-10-CM

## 2021-01-18 LAB
CTP QC/QA: YES
SARS-COV-2 RDRP RESP QL NAA+PROBE: POSITIVE

## 2021-01-18 PROCEDURE — U0002: ICD-10-PCS | Mod: QW,S$GLB,, | Performed by: PHYSICIAN ASSISTANT

## 2021-01-18 PROCEDURE — 99213 OFFICE O/P EST LOW 20 MIN: CPT | Mod: S$GLB,,, | Performed by: PHYSICIAN ASSISTANT

## 2021-01-18 PROCEDURE — U0002 COVID-19 LAB TEST NON-CDC: HCPCS | Mod: QW,S$GLB,, | Performed by: PHYSICIAN ASSISTANT

## 2021-01-18 PROCEDURE — 99213 PR OFFICE/OUTPT VISIT, EST, LEVL III, 20-29 MIN: ICD-10-PCS | Mod: S$GLB,,, | Performed by: PHYSICIAN ASSISTANT

## 2021-01-20 ENCOUNTER — NURSE TRIAGE (OUTPATIENT)
Dept: ADMINISTRATIVE | Facility: CLINIC | Age: 56
End: 2021-01-20

## 2021-01-20 LAB
FINAL PATHOLOGIC DIAGNOSIS: NORMAL
Lab: NORMAL

## 2021-01-22 ENCOUNTER — NURSE TRIAGE (OUTPATIENT)
Dept: ADMINISTRATIVE | Facility: CLINIC | Age: 56
End: 2021-01-22

## 2021-02-03 ENCOUNTER — CLINICAL SUPPORT (OUTPATIENT)
Dept: REHABILITATION | Facility: HOSPITAL | Age: 56
End: 2021-02-03
Payer: OTHER GOVERNMENT

## 2021-02-03 DIAGNOSIS — R29.898 WEAKNESS OF RIGHT LOWER EXTREMITY: ICD-10-CM

## 2021-02-03 DIAGNOSIS — R29.898 WEAKNESS OF BOTH HIPS: ICD-10-CM

## 2021-02-03 DIAGNOSIS — Z78.9 DIFFICULTY NAVIGATING STAIRS: ICD-10-CM

## 2021-02-03 PROCEDURE — 97110 THERAPEUTIC EXERCISES: CPT

## 2021-02-04 ENCOUNTER — PATIENT OUTREACH (OUTPATIENT)
Dept: ADMINISTRATIVE | Facility: OTHER | Age: 56
End: 2021-02-04

## 2021-02-05 ENCOUNTER — OFFICE VISIT (OUTPATIENT)
Dept: ORTHOPEDICS | Facility: CLINIC | Age: 56
End: 2021-02-05
Payer: OTHER GOVERNMENT

## 2021-02-05 DIAGNOSIS — M17.11 PATELLOFEMORAL ARTHRITIS OF RIGHT KNEE: Primary | ICD-10-CM

## 2021-02-05 DIAGNOSIS — M25.561 RIGHT KNEE PAIN, UNSPECIFIED CHRONICITY: ICD-10-CM

## 2021-02-05 PROCEDURE — 99212 OFFICE O/P EST SF 10 MIN: CPT | Mod: PBBFAC | Performed by: STUDENT IN AN ORGANIZED HEALTH CARE EDUCATION/TRAINING PROGRAM

## 2021-02-05 PROCEDURE — 99999 PR PBB SHADOW E&M-EST. PATIENT-LVL II: CPT | Mod: PBBFAC,,, | Performed by: STUDENT IN AN ORGANIZED HEALTH CARE EDUCATION/TRAINING PROGRAM

## 2021-02-05 PROCEDURE — 99999 PR PBB SHADOW E&M-EST. PATIENT-LVL II: ICD-10-PCS | Mod: PBBFAC,,, | Performed by: STUDENT IN AN ORGANIZED HEALTH CARE EDUCATION/TRAINING PROGRAM

## 2021-02-05 PROCEDURE — 99213 OFFICE O/P EST LOW 20 MIN: CPT | Mod: S$PBB,,, | Performed by: STUDENT IN AN ORGANIZED HEALTH CARE EDUCATION/TRAINING PROGRAM

## 2021-02-05 PROCEDURE — 99213 PR OFFICE/OUTPT VISIT, EST, LEVL III, 20-29 MIN: ICD-10-PCS | Mod: S$PBB,,, | Performed by: STUDENT IN AN ORGANIZED HEALTH CARE EDUCATION/TRAINING PROGRAM

## 2021-02-10 ENCOUNTER — CLINICAL SUPPORT (OUTPATIENT)
Dept: REHABILITATION | Facility: HOSPITAL | Age: 56
End: 2021-02-10
Payer: OTHER GOVERNMENT

## 2021-02-10 DIAGNOSIS — Z78.9 DIFFICULTY NAVIGATING STAIRS: ICD-10-CM

## 2021-02-10 DIAGNOSIS — R29.898 WEAKNESS OF RIGHT LOWER EXTREMITY: ICD-10-CM

## 2021-02-10 DIAGNOSIS — R29.898 WEAKNESS OF BOTH HIPS: ICD-10-CM

## 2021-02-10 PROCEDURE — 97110 THERAPEUTIC EXERCISES: CPT

## 2021-02-24 ENCOUNTER — CLINICAL SUPPORT (OUTPATIENT)
Dept: REHABILITATION | Facility: HOSPITAL | Age: 56
End: 2021-02-24
Payer: OTHER GOVERNMENT

## 2021-02-24 DIAGNOSIS — R29.898 WEAKNESS OF BOTH HIPS: ICD-10-CM

## 2021-02-24 DIAGNOSIS — R29.898 WEAKNESS OF RIGHT LOWER EXTREMITY: ICD-10-CM

## 2021-02-24 DIAGNOSIS — Z78.9 DIFFICULTY NAVIGATING STAIRS: ICD-10-CM

## 2021-02-24 PROCEDURE — 97110 THERAPEUTIC EXERCISES: CPT

## 2021-03-04 ENCOUNTER — CLINICAL SUPPORT (OUTPATIENT)
Dept: REHABILITATION | Facility: HOSPITAL | Age: 56
End: 2021-03-04
Payer: OTHER GOVERNMENT

## 2021-03-04 DIAGNOSIS — Z78.9 DIFFICULTY NAVIGATING STAIRS: ICD-10-CM

## 2021-03-04 DIAGNOSIS — R29.898 WEAKNESS OF BOTH HIPS: ICD-10-CM

## 2021-03-04 DIAGNOSIS — R29.898 WEAKNESS OF RIGHT LOWER EXTREMITY: ICD-10-CM

## 2021-03-04 PROCEDURE — 97110 THERAPEUTIC EXERCISES: CPT

## 2021-04-11 ENCOUNTER — PATIENT MESSAGE (OUTPATIENT)
Dept: PRIMARY CARE CLINIC | Facility: CLINIC | Age: 56
End: 2021-04-11

## 2021-04-12 ENCOUNTER — OFFICE VISIT (OUTPATIENT)
Dept: PRIMARY CARE CLINIC | Facility: CLINIC | Age: 56
End: 2021-04-12
Payer: OTHER GOVERNMENT

## 2021-04-12 ENCOUNTER — TELEPHONE (OUTPATIENT)
Dept: ENDOSCOPY | Facility: HOSPITAL | Age: 56
End: 2021-04-12

## 2021-04-12 VITALS
BODY MASS INDEX: 31.53 KG/M2 | DIASTOLIC BLOOD PRESSURE: 76 MMHG | WEIGHT: 167 LBS | HEIGHT: 61 IN | SYSTOLIC BLOOD PRESSURE: 131 MMHG

## 2021-04-12 DIAGNOSIS — R51.9 CHRONIC NONINTRACTABLE HEADACHE, UNSPECIFIED HEADACHE TYPE: Primary | ICD-10-CM

## 2021-04-12 DIAGNOSIS — E66.9 CLASS 1 OBESITY WITH BODY MASS INDEX (BMI) OF 32.0 TO 32.9 IN ADULT, UNSPECIFIED OBESITY TYPE, UNSPECIFIED WHETHER SERIOUS COMORBIDITY PRESENT: ICD-10-CM

## 2021-04-12 DIAGNOSIS — L24.89 IRRITANT CONTACT DERMATITIS DUE TO OTHER AGENTS: ICD-10-CM

## 2021-04-12 DIAGNOSIS — Z12.11 COLON CANCER SCREENING: ICD-10-CM

## 2021-04-12 DIAGNOSIS — R03.0 ELEVATED BP WITHOUT DIAGNOSIS OF HYPERTENSION: ICD-10-CM

## 2021-04-12 DIAGNOSIS — G89.29 CHRONIC NONINTRACTABLE HEADACHE, UNSPECIFIED HEADACHE TYPE: Primary | ICD-10-CM

## 2021-04-12 PROCEDURE — 99214 OFFICE O/P EST MOD 30 MIN: CPT | Mod: 95,,, | Performed by: FAMILY MEDICINE

## 2021-04-12 PROCEDURE — 99214 PR OFFICE/OUTPT VISIT, EST, LEVL IV, 30-39 MIN: ICD-10-PCS | Mod: 95,,, | Performed by: FAMILY MEDICINE

## 2021-04-12 RX ORDER — CLOBETASOL PROPIONATE 0.5 MG/G
OINTMENT TOPICAL 2 TIMES DAILY
Qty: 60 G | Refills: 0 | Status: SHIPPED | OUTPATIENT
Start: 2021-04-12 | End: 2021-12-28 | Stop reason: SDUPTHER

## 2021-04-13 ENCOUNTER — TELEPHONE (OUTPATIENT)
Dept: ENDOSCOPY | Facility: HOSPITAL | Age: 56
End: 2021-04-13

## 2021-04-13 ENCOUNTER — PATIENT MESSAGE (OUTPATIENT)
Dept: ENDOSCOPY | Facility: HOSPITAL | Age: 56
End: 2021-04-13

## 2021-04-13 DIAGNOSIS — Z13.9 SCREENING PROCEDURE: Primary | ICD-10-CM

## 2021-04-13 DIAGNOSIS — Z01.818 PRE-OP TESTING: ICD-10-CM

## 2021-04-13 RX ORDER — SODIUM, POTASSIUM,MAG SULFATES 17.5-3.13G
1 SOLUTION, RECONSTITUTED, ORAL ORAL DAILY
Qty: 1 KIT | Refills: 0 | Status: SHIPPED | OUTPATIENT
Start: 2021-04-13 | End: 2021-04-15

## 2021-04-28 ENCOUNTER — PATIENT MESSAGE (OUTPATIENT)
Dept: RESEARCH | Facility: HOSPITAL | Age: 56
End: 2021-04-28

## 2021-07-14 ENCOUNTER — PATIENT MESSAGE (OUTPATIENT)
Dept: ENDOSCOPY | Facility: HOSPITAL | Age: 56
End: 2021-07-14

## 2021-07-14 DIAGNOSIS — Z01.818 PRE-OP TESTING: Primary | ICD-10-CM

## 2021-07-19 ENCOUNTER — OFFICE VISIT (OUTPATIENT)
Dept: PRIMARY CARE CLINIC | Facility: CLINIC | Age: 56
End: 2021-07-19
Payer: OTHER GOVERNMENT

## 2021-07-19 DIAGNOSIS — G89.29 CHRONIC NONINTRACTABLE HEADACHE, UNSPECIFIED HEADACHE TYPE: Primary | ICD-10-CM

## 2021-07-19 DIAGNOSIS — Z00.00 ROUTINE GENERAL MEDICAL EXAMINATION AT A HEALTH CARE FACILITY: ICD-10-CM

## 2021-07-19 DIAGNOSIS — R51.9 CHRONIC NONINTRACTABLE HEADACHE, UNSPECIFIED HEADACHE TYPE: Primary | ICD-10-CM

## 2021-07-19 DIAGNOSIS — R03.0 ELEVATED BP WITHOUT DIAGNOSIS OF HYPERTENSION: ICD-10-CM

## 2021-07-19 DIAGNOSIS — E66.9 OBESITY, UNSPECIFIED CLASSIFICATION, UNSPECIFIED OBESITY TYPE, UNSPECIFIED WHETHER SERIOUS COMORBIDITY PRESENT: ICD-10-CM

## 2021-07-19 DIAGNOSIS — F41.1 GAD (GENERALIZED ANXIETY DISORDER): ICD-10-CM

## 2021-07-19 PROCEDURE — 99214 PR OFFICE/OUTPT VISIT, EST, LEVL IV, 30-39 MIN: ICD-10-PCS | Mod: 95,,, | Performed by: FAMILY MEDICINE

## 2021-07-19 PROCEDURE — 99214 OFFICE O/P EST MOD 30 MIN: CPT | Mod: 95,,, | Performed by: FAMILY MEDICINE

## 2021-09-10 ENCOUNTER — TELEPHONE (OUTPATIENT)
Dept: ENDOSCOPY | Facility: HOSPITAL | Age: 56
End: 2021-09-10

## 2021-10-18 ENCOUNTER — PATIENT MESSAGE (OUTPATIENT)
Dept: PRIMARY CARE CLINIC | Facility: CLINIC | Age: 56
End: 2021-10-18
Payer: OTHER GOVERNMENT

## 2021-12-22 ENCOUNTER — PATIENT MESSAGE (OUTPATIENT)
Dept: ENDOSCOPY | Facility: HOSPITAL | Age: 56
End: 2021-12-22
Payer: OTHER GOVERNMENT

## 2021-12-22 DIAGNOSIS — Z12.31 OTHER SCREENING MAMMOGRAM: ICD-10-CM

## 2021-12-28 ENCOUNTER — OFFICE VISIT (OUTPATIENT)
Dept: INTERNAL MEDICINE | Facility: CLINIC | Age: 56
End: 2021-12-28
Payer: OTHER GOVERNMENT

## 2021-12-28 ENCOUNTER — HOSPITAL ENCOUNTER (OUTPATIENT)
Dept: RADIOLOGY | Facility: HOSPITAL | Age: 56
Discharge: HOME | End: 2021-12-28
Attending: NURSE PRACTITIONER
Payer: OTHER GOVERNMENT

## 2021-12-28 VITALS
HEIGHT: 61 IN | WEIGHT: 188.25 LBS | SYSTOLIC BLOOD PRESSURE: 130 MMHG | TEMPERATURE: 98 F | HEART RATE: 68 BPM | DIASTOLIC BLOOD PRESSURE: 100 MMHG | BODY MASS INDEX: 35.54 KG/M2

## 2021-12-28 DIAGNOSIS — R21 RASH OF HANDS: Primary | ICD-10-CM

## 2021-12-28 DIAGNOSIS — M25.512 ACUTE PAIN OF LEFT SHOULDER: ICD-10-CM

## 2021-12-28 DIAGNOSIS — F41.8 MIXED ANXIETY DEPRESSIVE DISORDER: ICD-10-CM

## 2021-12-28 PROCEDURE — 99214 OFFICE O/P EST MOD 30 MIN: CPT | Mod: S$PBB,,, | Performed by: NURSE PRACTITIONER

## 2021-12-28 PROCEDURE — 73030 X-RAY EXAM OF SHOULDER: CPT | Mod: 26,LT,, | Performed by: RADIOLOGY

## 2021-12-28 PROCEDURE — 99999 PR PBB SHADOW E&M-EST. PATIENT-LVL IV: ICD-10-PCS | Mod: PBBFAC,,, | Performed by: NURSE PRACTITIONER

## 2021-12-28 PROCEDURE — 99999 PR PBB SHADOW E&M-EST. PATIENT-LVL IV: CPT | Mod: PBBFAC,,, | Performed by: NURSE PRACTITIONER

## 2021-12-28 PROCEDURE — 99214 OFFICE O/P EST MOD 30 MIN: CPT | Mod: PBBFAC,PO | Performed by: NURSE PRACTITIONER

## 2021-12-28 PROCEDURE — 73030 XR SHOULDER COMPLETE 2 OR MORE VIEWS LEFT: ICD-10-PCS | Mod: 26,LT,, | Performed by: RADIOLOGY

## 2021-12-28 PROCEDURE — 73030 X-RAY EXAM OF SHOULDER: CPT | Mod: TC,FY,PO,LT

## 2021-12-28 PROCEDURE — 99214 PR OFFICE/OUTPT VISIT, EST, LEVL IV, 30-39 MIN: ICD-10-PCS | Mod: S$PBB,,, | Performed by: NURSE PRACTITIONER

## 2021-12-28 RX ORDER — CITALOPRAM 20 MG/1
20 TABLET, FILM COATED ORAL DAILY
Qty: 90 TABLET | Refills: 0 | Status: SHIPPED | OUTPATIENT
Start: 2021-12-28 | End: 2022-02-16 | Stop reason: SDUPTHER

## 2021-12-28 RX ORDER — MELOXICAM 15 MG/1
15 TABLET ORAL DAILY
Qty: 30 TABLET | Refills: 0 | Status: SHIPPED | OUTPATIENT
Start: 2021-12-28 | End: 2022-02-16 | Stop reason: SDUPTHER

## 2021-12-28 RX ORDER — CLOBETASOL PROPIONATE 0.5 MG/G
OINTMENT TOPICAL 2 TIMES DAILY
Qty: 60 G | Refills: 0 | Status: SHIPPED | OUTPATIENT
Start: 2021-12-28

## 2022-01-12 ENCOUNTER — OFFICE VISIT (OUTPATIENT)
Dept: INTERNAL MEDICINE | Facility: CLINIC | Age: 57
End: 2022-01-12
Payer: OTHER GOVERNMENT

## 2022-01-12 DIAGNOSIS — U07.1 COVID-19: Primary | ICD-10-CM

## 2022-01-12 LAB
CTP QC/QA: YES
CTP QC/QA: YES
POC MOLECULAR INFLUENZA A AGN: NEGATIVE
POC MOLECULAR INFLUENZA B AGN: NEGATIVE
SARS-COV-2 RDRP RESP QL NAA+PROBE: POSITIVE

## 2022-01-12 PROCEDURE — 99213 OFFICE O/P EST LOW 20 MIN: CPT | Mod: 95,,, | Performed by: NURSE PRACTITIONER

## 2022-01-12 PROCEDURE — 87502 POCT INFLUENZA A/B MOLECULAR: ICD-10-PCS | Mod: QW,,, | Performed by: NURSE PRACTITIONER

## 2022-01-12 PROCEDURE — 87502 INFLUENZA DNA AMP PROBE: CPT | Mod: QW,,, | Performed by: NURSE PRACTITIONER

## 2022-01-12 PROCEDURE — 99213 PR OFFICE/OUTPT VISIT, EST, LEVL III, 20-29 MIN: ICD-10-PCS | Mod: 95,,, | Performed by: NURSE PRACTITIONER

## 2022-01-12 NOTE — PROGRESS NOTES
Subjective:       Patient ID: Tova Muñoz is a 57 y.o. female.    Chief Complaint: No chief complaint on file.    The patient location is: home  The chief complaint leading to consultation is: Upper Respiratory Infection      Visit type: audiovisual    Face to Face time with patient: 15 minutes  20 minutes of total time spent on the encounter, which includes face to face time and non-face to face time preparing to see the patient (eg, review of tests), Obtaining and/or reviewing separately obtained history, Documenting clinical information in the electronic or other health record, Independently interpreting results (not separately reported) and communicating results to the patient/family/caregiver, or Care coordination (not separately reported).         Each patient to whom he or she provides medical services by telemedicine is:  (1) informed of the relationship between the physician and patient and the respective role of any other health care provider with respect to management of the patient; and (2) notified that he or she may decline to receive medical services by telemedicine and may withdraw from such care at any time.    Notes:   Patient doing virtual visit for Upper Respiratory Infection Symptoms x 2 days  Has nasal congestion, sore throat, cough  Cough worsening and now has headache  No known fevers  Having some neck pain  Patient is taking lozenges, mobic, zyrtec    Cough  This is a new problem. The current episode started in the past 7 days. The problem has been gradually worsening. The problem occurs every few minutes. The cough is non-productive. Associated symptoms include ear congestion, headaches, nasal congestion, postnasal drip, rhinorrhea, a sore throat, shortness of breath and sweats. Pertinent negatives include no chest pain, chills, ear pain, fever, heartburn, hemoptysis, myalgias, rash, weight loss or wheezing. The symptoms are aggravated by lying down. She has tried OTC cough suppressant,  body position changes, cool air and rest for the symptoms. The treatment provided mild relief. Her past medical history is significant for bronchitis and environmental allergies. There is no history of asthma, bronchiectasis, COPD, emphysema or pneumonia.       LMP 08/06/2018     Review of Systems   Constitutional: Negative for chills, fever and weight loss.   HENT: Positive for postnasal drip, rhinorrhea and sore throat. Negative for ear pain.    Respiratory: Positive for cough and shortness of breath. Negative for hemoptysis and wheezing.    Cardiovascular: Negative for chest pain.   Gastrointestinal: Negative for heartburn.   Musculoskeletal: Negative for myalgias.   Skin: Negative for rash.   Allergic/Immunologic: Positive for environmental allergies.   Neurological: Positive for headaches.       Objective:      Physical Exam  Constitutional:       General: She is not in acute distress.     Appearance: She is well-developed and well-nourished. She is not diaphoretic.   HENT:      Head: Normocephalic and atraumatic.      Right Ear: External ear normal.      Left Ear: External ear normal.   Eyes:      General: No scleral icterus.        Right eye: No discharge.         Left eye: No discharge.      Conjunctiva/sclera: Conjunctivae normal.   Pulmonary:      Effort: Pulmonary effort is normal. No tachypnea, accessory muscle usage or respiratory distress.      Breath sounds: No stridor.   Musculoskeletal:      Cervical back: Normal range of motion and neck supple.   Skin:     Findings: No rash.   Neurological:      Mental Status: She is alert. She is not disoriented.   Psychiatric:         Attention and Perception: She is attentive.         Mood and Affect: Mood and affect normal. Mood is not anxious or depressed. Affect is not labile, blunt, angry or inappropriate.         Speech: Speech normal.         Behavior: Behavior normal. Behavior is not actively hallucinating.         Thought Content: Thought content normal.          Cognition and Memory: Cognition and memory normal.         Judgment: Judgment normal.         Assessment:       1. COVID-19        Plan:       Diagnoses and all orders for this visit:    COVID-19  -     POCT COVID-19 Rapid Screening  -     POCT Influenza A/B Molecular    flu Negative  covid positive   recommended rest, hydration, supportive care, deep breathing  cdc recommended quarantine discussed  Infection precautions discussed  Vitamins c, d, zinc discussed  mucinex with lots of fluids  Emergency Room if shortness of breath, issues breathing

## 2022-02-01 ENCOUNTER — PATIENT OUTREACH (OUTPATIENT)
Dept: ADMINISTRATIVE | Facility: OTHER | Age: 57
End: 2022-02-01
Payer: OTHER GOVERNMENT

## 2022-02-01 ENCOUNTER — OFFICE VISIT (OUTPATIENT)
Dept: DERMATOLOGY | Facility: CLINIC | Age: 57
End: 2022-02-01
Payer: OTHER GOVERNMENT

## 2022-02-01 DIAGNOSIS — L30.9 HAND ECZEMA: ICD-10-CM

## 2022-02-01 DIAGNOSIS — Z12.83 SCREENING, MALIGNANT NEOPLASM, SKIN: ICD-10-CM

## 2022-02-01 DIAGNOSIS — L90.5 SCAR CONDITIONS/SKIN FIBROSIS: Primary | ICD-10-CM

## 2022-02-01 DIAGNOSIS — L20.89 OTHER ATOPIC DERMATITIS: ICD-10-CM

## 2022-02-01 DIAGNOSIS — D22.9 MULTIPLE NEVI: ICD-10-CM

## 2022-02-01 DIAGNOSIS — Z85.828 HISTORY OF SKIN CANCER: ICD-10-CM

## 2022-02-01 PROCEDURE — 99999 PR PBB SHADOW E&M-EST. PATIENT-LVL II: ICD-10-PCS | Mod: PBBFAC,,, | Performed by: DERMATOLOGY

## 2022-02-01 PROCEDURE — 99214 PR OFFICE/OUTPT VISIT, EST, LEVL IV, 30-39 MIN: ICD-10-PCS | Mod: S$PBB,,, | Performed by: DERMATOLOGY

## 2022-02-01 PROCEDURE — 99999 PR PBB SHADOW E&M-EST. PATIENT-LVL II: CPT | Mod: PBBFAC,,, | Performed by: DERMATOLOGY

## 2022-02-01 PROCEDURE — 99212 OFFICE O/P EST SF 10 MIN: CPT | Mod: PBBFAC | Performed by: DERMATOLOGY

## 2022-02-01 PROCEDURE — 99214 OFFICE O/P EST MOD 30 MIN: CPT | Mod: S$PBB,,, | Performed by: DERMATOLOGY

## 2022-02-01 RX ORDER — CRISABOROLE 20 MG/G
OINTMENT TOPICAL
Qty: 60 G | Refills: 3 | Status: SHIPPED | OUTPATIENT
Start: 2022-02-01 | End: 2023-02-28

## 2022-02-01 NOTE — PATIENT INSTRUCTIONS
HAND ECZEMA INSTRUCTIONS    Use eucrisa twice a day. Use with white cotton gloves at bedtime.   Use Aquaphor 3-in-1 diaper rash cream (with zinc)  Avoid use of hand sanitizers when possible  Use dove sensitive skin bar for hand washing

## 2022-02-01 NOTE — PROGRESS NOTES
Subjective:       Patient ID:  Tova Muñoz is a 57 y.o. female who presents for No chief complaint on file.    Hx of NMSC of the left nasal ala, BCC of the right mid-back (s/p excision on 5/7/20) and IDN of the right nasal ala, last seen on 8/20/20.  Denies bleeding, tender, growing, or concerning lesions.     She c/o rash of the bilateral hands x several months.  + pruritus. tx c/ clobetasol ointment    This is a high risk patient here to check for the development of new lesions.        Review of Systems   Constitutional: Negative for fever and chills.   Gastrointestinal: Negative for nausea and vomiting.   Skin: Positive for activity-related sunscreen use. Negative for daily sunscreen use and recent sunburn.   Hematologic/Lymphatic: Does not bruise/bleed easily.        Objective:    Physical Exam   Constitutional: She appears well-developed and well-nourished. No distress.   Neurological: She is alert and oriented to person, place, and time. She is not disoriented.   Psychiatric: She has a normal mood and affect.   Skin:   Areas Examined (abnormalities noted in diagram):   Scalp / Hair Palpated and Inspected  Head / Face Inspection Performed  Neck Inspection Performed  Chest / Axilla Inspection Performed  Abdomen Inspection Performed  Genitals / Buttocks / Groin Inspection Performed  Back Inspection Performed  RUE Inspected  LUE Inspection Performed  RLE Inspected  LLE Inspection Performed  Nails and Digits Inspection Performed                           Diagram Legend     Erythematous scaling macule/papule c/w actinic keratosis       Vascular papule c/w angioma      Pigmented verrucoid papule/plaque c/w seborrheic keratosis      Yellow umbilicated papule c/w sebaceous hyperplasia      Irregularly shaped tan macule c/w lentigo     1-2 mm smooth white papules consistent with Milia      Movable subcutaneous cyst with punctum c/w epidermal inclusion cyst      Subcutaneous movable cyst c/w pilar cyst      Firm  pink to brown papule c/w dermatofibroma      Pedunculated fleshy papule(s) c/w skin tag(s)      Evenly pigmented macule c/w junctional nevus     Mildly variegated pigmented, slightly irregular-bordered macule c/w mildly atypical nevus      Flesh colored to evenly pigmented papule c/w intradermal nevus       Pink pearly papule/plaque c/w basal cell carcinoma      Erythematous hyperkeratotic cursted plaque c/w SCC      Surgical scar with no sign of skin cancer recurrence      Open and closed comedones      Inflammatory papules and pustules      Verrucoid papule consistent consistent with wart     Erythematous eczematous patches and plaques     Dystrophic onycholytic nail with subungual debris c/w onychomycosis     Umbilicated papule    Erythematous-base heme-crusted tan verrucoid plaque consistent with inflamed seborrheic keratosis     Erythematous Silvery Scaling Plaque c/w Psoriasis     See annotation      Assessment / Plan:        Scar conditions/skin fibrosis  Screening, malignant neoplasm, skin  History of skin cancer  Scar of the left nasal ala, right mid-back, hx of NMSC.  No evidence of recurrence on physical exam today.  Continue routine skin surveillance. Daily sunscreen advised.    Multiple nevi  Reassurance given.  Discussed ABCDEF of melanoma and changes for patient to look for.  AAD Handout given. Discussed importance of daily use of sunscreen which is broad-spectrum and has a minimum SPF of 30.    Hand eczema  Other atopic dermatitis  -     crisaborole (EUCRISA) 2 % Oint; AAA bid prn.  Non-steroid.  Safe to use long-term.  Dispense: 60 g; Refill:   -     Will add eucrisa for maintenance, continue clobetasol prn flares.  Discussed sensitive skin care.              Follow up in about 6 months (around 8/1/2022).

## 2022-02-10 ENCOUNTER — LAB VISIT (OUTPATIENT)
Dept: LAB | Facility: HOSPITAL | Age: 57
End: 2022-02-10
Attending: FAMILY MEDICINE
Payer: OTHER GOVERNMENT

## 2022-02-10 DIAGNOSIS — G89.29 CHRONIC NONINTRACTABLE HEADACHE, UNSPECIFIED HEADACHE TYPE: ICD-10-CM

## 2022-02-10 DIAGNOSIS — R03.0 ELEVATED BP WITHOUT DIAGNOSIS OF HYPERTENSION: ICD-10-CM

## 2022-02-10 DIAGNOSIS — F41.1 GAD (GENERALIZED ANXIETY DISORDER): ICD-10-CM

## 2022-02-10 DIAGNOSIS — Z00.00 ROUTINE GENERAL MEDICAL EXAMINATION AT A HEALTH CARE FACILITY: ICD-10-CM

## 2022-02-10 DIAGNOSIS — R51.9 CHRONIC NONINTRACTABLE HEADACHE, UNSPECIFIED HEADACHE TYPE: ICD-10-CM

## 2022-02-10 LAB
ALBUMIN SERPL BCP-MCNC: 4 G/DL (ref 3.5–5.2)
ALP SERPL-CCNC: 70 U/L (ref 55–135)
ALT SERPL W/O P-5'-P-CCNC: 18 U/L (ref 10–44)
ANION GAP SERPL CALC-SCNC: 11 MMOL/L (ref 8–16)
AST SERPL-CCNC: 20 U/L (ref 10–40)
BASOPHILS # BLD AUTO: 0.02 K/UL (ref 0–0.2)
BASOPHILS NFR BLD: 0.6 % (ref 0–1.9)
BILIRUB SERPL-MCNC: 0.4 MG/DL (ref 0.1–1)
BUN SERPL-MCNC: 15 MG/DL (ref 6–20)
CALCIUM SERPL-MCNC: 9.5 MG/DL (ref 8.7–10.5)
CHLORIDE SERPL-SCNC: 106 MMOL/L (ref 95–110)
CO2 SERPL-SCNC: 24 MMOL/L (ref 23–29)
CREAT SERPL-MCNC: 0.8 MG/DL (ref 0.5–1.4)
DIFFERENTIAL METHOD: ABNORMAL
EOSINOPHIL # BLD AUTO: 0.1 K/UL (ref 0–0.5)
EOSINOPHIL NFR BLD: 2.6 % (ref 0–8)
ERYTHROCYTE [DISTWIDTH] IN BLOOD BY AUTOMATED COUNT: 13 % (ref 11.5–14.5)
EST. GFR  (AFRICAN AMERICAN): >60 ML/MIN/1.73 M^2
EST. GFR  (NON AFRICAN AMERICAN): >60 ML/MIN/1.73 M^2
ESTIMATED AVG GLUCOSE: 105 MG/DL (ref 68–131)
GLUCOSE SERPL-MCNC: 92 MG/DL (ref 70–110)
HBA1C MFR BLD: 5.3 % (ref 4–5.6)
HCT VFR BLD AUTO: 36.9 % (ref 37–48.5)
HGB BLD-MCNC: 11.9 G/DL (ref 12–16)
IMM GRANULOCYTES # BLD AUTO: 0.02 K/UL (ref 0–0.04)
IMM GRANULOCYTES NFR BLD AUTO: 0.6 % (ref 0–0.5)
INSULIN COLLECTION INTERVAL: 0
INSULIN SERPL-ACNC: 7.8 UU/ML
LYMPHOCYTES # BLD AUTO: 1.2 K/UL (ref 1–4.8)
LYMPHOCYTES NFR BLD: 35.1 % (ref 18–48)
MCH RBC QN AUTO: 33.9 PG (ref 27–31)
MCHC RBC AUTO-ENTMCNC: 32.2 G/DL (ref 32–36)
MCV RBC AUTO: 105 FL (ref 82–98)
MONOCYTES # BLD AUTO: 0.3 K/UL (ref 0.3–1)
MONOCYTES NFR BLD: 8.5 % (ref 4–15)
NEUTROPHILS # BLD AUTO: 1.8 K/UL (ref 1.8–7.7)
NEUTROPHILS NFR BLD: 52.6 % (ref 38–73)
NRBC BLD-RTO: 0 /100 WBC
PLATELET # BLD AUTO: 143 K/UL (ref 150–450)
PMV BLD AUTO: 10.3 FL (ref 9.2–12.9)
POTASSIUM SERPL-SCNC: 4.2 MMOL/L (ref 3.5–5.1)
PROT SERPL-MCNC: 7.3 G/DL (ref 6–8.4)
RBC # BLD AUTO: 3.51 M/UL (ref 4–5.4)
SODIUM SERPL-SCNC: 141 MMOL/L (ref 136–145)
T4 FREE SERPL-MCNC: 0.92 NG/DL (ref 0.71–1.51)
TSH SERPL DL<=0.005 MIU/L-ACNC: 2.67 UIU/ML (ref 0.4–4)
WBC # BLD AUTO: 3.42 K/UL (ref 3.9–12.7)

## 2022-02-10 PROCEDURE — 83525 ASSAY OF INSULIN: CPT | Performed by: FAMILY MEDICINE

## 2022-02-10 PROCEDURE — 84443 ASSAY THYROID STIM HORMONE: CPT | Performed by: FAMILY MEDICINE

## 2022-02-10 PROCEDURE — 83036 HEMOGLOBIN GLYCOSYLATED A1C: CPT | Performed by: FAMILY MEDICINE

## 2022-02-10 PROCEDURE — 80053 COMPREHEN METABOLIC PANEL: CPT | Performed by: FAMILY MEDICINE

## 2022-02-10 PROCEDURE — 85025 COMPLETE CBC W/AUTO DIFF WBC: CPT | Performed by: FAMILY MEDICINE

## 2022-02-10 PROCEDURE — 84439 ASSAY OF FREE THYROXINE: CPT | Performed by: FAMILY MEDICINE

## 2022-02-16 ENCOUNTER — LAB VISIT (OUTPATIENT)
Dept: LAB | Facility: HOSPITAL | Age: 57
End: 2022-02-16
Attending: FAMILY MEDICINE
Payer: OTHER GOVERNMENT

## 2022-02-16 ENCOUNTER — OFFICE VISIT (OUTPATIENT)
Dept: PRIMARY CARE CLINIC | Facility: CLINIC | Age: 57
End: 2022-02-16
Payer: OTHER GOVERNMENT

## 2022-02-16 VITALS
BODY MASS INDEX: 34.8 KG/M2 | TEMPERATURE: 97 F | HEIGHT: 61 IN | SYSTOLIC BLOOD PRESSURE: 130 MMHG | WEIGHT: 184.31 LBS | OXYGEN SATURATION: 97 % | HEART RATE: 75 BPM | DIASTOLIC BLOOD PRESSURE: 50 MMHG

## 2022-02-16 DIAGNOSIS — E66.9 CLASS 1 OBESITY WITH BODY MASS INDEX (BMI) OF 32.0 TO 32.9 IN ADULT, UNSPECIFIED OBESITY TYPE, UNSPECIFIED WHETHER SERIOUS COMORBIDITY PRESENT: ICD-10-CM

## 2022-02-16 DIAGNOSIS — N95.1 HOT FLASHES DUE TO MENOPAUSE: ICD-10-CM

## 2022-02-16 DIAGNOSIS — M25.512 ACUTE PAIN OF LEFT SHOULDER: ICD-10-CM

## 2022-02-16 DIAGNOSIS — F41.1 GAD (GENERALIZED ANXIETY DISORDER): ICD-10-CM

## 2022-02-16 DIAGNOSIS — J30.2 SEASONAL ALLERGIES: Chronic | ICD-10-CM

## 2022-02-16 DIAGNOSIS — Z00.01 ENCOUNTER FOR PREVENTATIVE ADULT HEALTH CARE EXAM WITH ABNORMAL FINDINGS: ICD-10-CM

## 2022-02-16 DIAGNOSIS — Z00.01 ENCOUNTER FOR PREVENTATIVE ADULT HEALTH CARE EXAM WITH ABNORMAL FINDINGS: Primary | ICD-10-CM

## 2022-02-16 DIAGNOSIS — Z12.11 COLON CANCER SCREENING: ICD-10-CM

## 2022-02-16 DIAGNOSIS — F41.8 MIXED ANXIETY DEPRESSIVE DISORDER: ICD-10-CM

## 2022-02-16 DIAGNOSIS — R03.0 ELEVATED BP WITHOUT DIAGNOSIS OF HYPERTENSION: ICD-10-CM

## 2022-02-16 LAB
CHOLEST SERPL-MCNC: 268 MG/DL (ref 120–199)
CHOLEST/HDLC SERPL: 3.6 {RATIO} (ref 2–5)
HDLC SERPL-MCNC: 74 MG/DL (ref 40–75)
HDLC SERPL: 27.6 % (ref 20–50)
LDLC SERPL CALC-MCNC: 175.8 MG/DL (ref 63–159)
NONHDLC SERPL-MCNC: 194 MG/DL
TRIGL SERPL-MCNC: 91 MG/DL (ref 30–150)

## 2022-02-16 PROCEDURE — 99213 OFFICE O/P EST LOW 20 MIN: CPT | Mod: PBBFAC,PN | Performed by: FAMILY MEDICINE

## 2022-02-16 PROCEDURE — 99396 PREV VISIT EST AGE 40-64: CPT | Mod: S$PBB,,, | Performed by: FAMILY MEDICINE

## 2022-02-16 PROCEDURE — 99999 PR PBB SHADOW E&M-EST. PATIENT-LVL III: ICD-10-PCS | Mod: PBBFAC,,, | Performed by: FAMILY MEDICINE

## 2022-02-16 PROCEDURE — 36415 COLL VENOUS BLD VENIPUNCTURE: CPT | Mod: PN | Performed by: FAMILY MEDICINE

## 2022-02-16 PROCEDURE — 99999 PR PBB SHADOW E&M-EST. PATIENT-LVL III: CPT | Mod: PBBFAC,,, | Performed by: FAMILY MEDICINE

## 2022-02-16 PROCEDURE — 99396 PR PREVENTIVE VISIT,EST,40-64: ICD-10-PCS | Mod: S$PBB,,, | Performed by: FAMILY MEDICINE

## 2022-02-16 PROCEDURE — 80061 LIPID PANEL: CPT | Performed by: FAMILY MEDICINE

## 2022-02-16 RX ORDER — CITALOPRAM 20 MG/1
20 TABLET, FILM COATED ORAL DAILY
Qty: 90 TABLET | Refills: 3 | Status: SHIPPED | OUTPATIENT
Start: 2022-02-16 | End: 2023-02-28 | Stop reason: SDUPTHER

## 2022-02-16 RX ORDER — MELOXICAM 15 MG/1
15 TABLET ORAL DAILY PRN
Qty: 90 TABLET | Refills: 1 | Status: SHIPPED | OUTPATIENT
Start: 2022-02-16 | End: 2022-08-10

## 2022-02-16 NOTE — PROGRESS NOTES
Subjective:      Patient ID: Tova Muñoz is a 57 y.o. female.    Chief Complaint: Annual Exam    Disclaimer:  This note is prepared using voice recognition software and as such is likely to have errors and has not been proof read. Please contact me for questions.     Pt is here for followup of chronic issues and annual exam.    The last 6 months she has not really been following her diet.  Her sister was diagnosed with cancer and she was down there with her and [place].  Otherwise she has been back but there has been a lot of work stress as well.  She is hoping to get back on track.  In the past she did very well with the weight loss program [email or web address].nging to her to help with her sister. Stress is very high.     Not sleeping well but thinks it is stress and busy at work and at home as well. Has another grandbaby also.     BP is good, ha are good as well.   Trying to still do the celexa as well for the stress depending on what all is going on with work and stress levels.   Headaches have been doing well for her.  Hot flashes have also been doing well as well.    Lab Results       Component                Value               Date                       HGBA1C                   5.3                 02/10/2022                 HGBA1C                   5.4                 10/06/2020                 HGBA1C                   5.5                 04/23/2019             Lab Results       Component                Value               Date                       CHOL                     216 (H)             11/19/2020                 CHOL                     262 (H)             10/06/2020                 CHOL                     241 (H)             04/23/2019            Lab Results       Component                Value               Date                       LDLCALC                  140.2               11/19/2020                 LDLCALC                  172.2 (H)           10/06/2020                 LDLCALC                  163.0  (H)           04/23/2019              Wt Readings from Last 10 Encounters:  02/16/22 : 83.6 kg (184 lb 4.9 oz)  12/28/21 : 85.4 kg (188 lb 4.4 oz)  04/12/21 : 75.8 kg (167 lb)  01/18/21 : 77.1 kg (170 lb)  01/11/21 : 77.3 kg (170 lb 6.4 oz)  12/11/20 : 78.9 kg (174 lb)  12/10/20 : 79 kg (174 lb 2.6 oz)  12/10/20 : 79.1 kg (174 lb 6.1 oz)  10/06/20 : 86 kg (189 lb 7.8 oz)  12/06/19 : 81.6 kg (180 lb)      The 10-year ASCVD risk score (Svetlana VERAS Jr., et al., 2013) is: 2.7%    Values used to calculate the score:      Age: 57 years      Sex: Female      Is Non- : No      Diabetic: No      Tobacco smoker: No      Systolic Blood Pressure: 130 mmHg      Is BP treated: No      HDL Cholesterol: 61 mg/dL      Total Cholesterol: 216 mg/dL                    Lab Results   Component Value Date    WBC 3.42 (L) 02/10/2022    HGB 11.9 (L) 02/10/2022    HCT 36.9 (L) 02/10/2022     (L) 02/10/2022    CHOL 268 (H) 02/16/2022    TRIG 91 02/16/2022    HDL 74 02/16/2022    ALT 18 02/10/2022    AST 20 02/10/2022     02/10/2022    K 4.2 02/10/2022     02/10/2022    CREATININE 0.8 02/10/2022    BUN 15 02/10/2022    CO2 24 02/10/2022    TSH 2.669 02/10/2022    HGBA1C 5.3 02/10/2022       X-Ray Shoulder 2 or More Views Left  Narrative: EXAMINATION:  XR SHOULDER COMPLETE 2 OR MORE VIEWS LEFT    CLINICAL HISTORY:  Pain in left shoulder    TECHNIQUE:  Two or three views of the left shoulder were preformed.    COMPARISON:  None    FINDINGS:  No acute fracture or dislocation.  Mild-to-moderate AC joint arthropathy noted.  No significant degenerative change at the glenohumeral joint.  Impression: 1.  As above    Electronically signed by: Iron Barton DO  Date:    12/28/2021  Time:    08:23        Review of Systems   Constitutional: Positive for activity change and appetite change. Negative for chills, fatigue and fever.   HENT: Negative for ear pain and trouble swallowing.    Eyes: Negative for pain and  "visual disturbance.   Respiratory: Negative for cough and shortness of breath.    Cardiovascular: Negative for chest pain and leg swelling.   Gastrointestinal: Negative for abdominal pain, blood in stool, nausea and vomiting.   Endocrine: Negative for cold intolerance and heat intolerance.   Genitourinary: Negative for dysuria and frequency.   Musculoskeletal: Negative for joint swelling, myalgias and neck pain.   Skin: Negative for color change and rash.   Neurological: Negative for dizziness and headaches.   Psychiatric/Behavioral: Negative for behavioral problems, dysphoric mood and sleep disturbance. The patient is not nervous/anxious.      Objective:     Vitals:    02/16/22 0752   BP: (!) 130/50   Pulse: 75   Temp: 97.3 °F (36.3 °C)   SpO2: 97%   Weight: 83.6 kg (184 lb 4.9 oz)   Height: 5' 1" (1.549 m)     Physical Exam  Vitals reviewed.   Constitutional:       Appearance: Normal appearance. She is well-developed. She is obese.   HENT:      Head: Normocephalic and atraumatic.      Right Ear: Tympanic membrane and external ear normal.      Left Ear: Tympanic membrane and external ear normal.      Nose: Nose normal.      Mouth/Throat:      Mouth: Mucous membranes are moist.      Pharynx: Oropharynx is clear.   Eyes:      Conjunctiva/sclera: Conjunctivae normal.      Pupils: Pupils are equal, round, and reactive to light.   Neck:      Thyroid: No thyromegaly.   Cardiovascular:      Rate and Rhythm: Normal rate and regular rhythm.      Heart sounds: No murmur heard.  No friction rub. No gallop.    Pulmonary:      Effort: Pulmonary effort is normal. No respiratory distress.      Breath sounds: No wheezing or rales.   Abdominal:      General: Bowel sounds are normal. There is no distension.      Palpations: Abdomen is soft.      Tenderness: There is no abdominal tenderness. There is no rebound.   Musculoskeletal:         General: Normal range of motion.      Cervical back: Normal range of motion and neck supple. "   Lymphadenopathy:      Cervical: No cervical adenopathy.   Skin:     General: Skin is warm and dry.      Findings: No rash.   Neurological:      Mental Status: She is alert and oriented to person, place, and time.   Psychiatric:         Attention and Perception: Attention and perception normal.         Mood and Affect: Mood and affect normal.         Speech: Speech normal.         Behavior: Behavior normal.         Thought Content: Thought content normal.         Cognition and Memory: Cognition and memory normal.         Judgment: Judgment normal.       Assessment:     1. Encounter for preventative adult health care exam with abnormal findings    2. NIKOLAS (generalized anxiety disorder)    3. Elevated BP without diagnosis of hypertension    4. Hot flashes due to menopause    5. Class 1 obesity with body mass index (BMI) of 32.0 to 32.9 in adult, unspecified obesity type, unspecified whether serious comorbidity present    6. Seasonal allergies    7. Colon cancer screening    8. Mixed anxiety depressive disorder    9. Acute pain of left shoulder      Plan:   Tova was seen today for annual exam.    Diagnoses and all orders for this visit:    Encounter for preventative adult health care exam with abnormal findings - labs ordered. Discussed Health Maintenance issues.   Elevated lipids, willing to work on diet and repeat again in 6 months.   -     Lipid Panel; Future    NIKOLAS (generalized anxiety disorder) - stable, Continue with current medications and interventions. Labs reviewed.       Elevated BP without diagnosis of hypertension- stable, Continue with current medications and interventions. Labs reviewed.  Work on wt loss.     Hot flashes due to menopause - stable, Continue with current medications and interventions. Labs reviewed.     Class 1 obesity with body mass index (BMI) of 32.0 to 32.9 in adult, unspecified obesity type, unspecified whether serious comorbidity present plans on working back again on diet exercise  and weight loss    Seasonal allergies stable at this time    Colon cancer screening placed order to to perform colonoscopy since due  -     Case Request Endoscopy: COLONOSCOPY    Mixed anxiety depressive disorder stable this time  -     citalopram (CELEXA) 20 MG tablet; Take 1 tablet (20 mg total) by mouth once daily.    Acute pain of left shoulder desires to have medication.  -     meloxicam (MOBIC) 15 MG tablet; Take 1 tablet (15 mg total) by mouth daily as needed for Pain (arthritis).            Follow up in about 1 year (around 2/16/2023) for physical with Dr RAMSEY.    Patient Instructions   Colonoscopy : [phone]    You can get a shingles vaccine, but currently the old one is not recommended anymore. Shingrix vaccine is the new shingles vaccine. You can ask at your local pharmacy.  It is 2 shots, and is not a live vaccine. It is usually  covered by insurance. It is 90 % effective against Shingles. You can start it now at age 50.  It is currently on backorder at some pharmacies and thus it may be difficult to get the 2nd shot.  You do not need a prescription to receive it.     Please let me know if you have further questions.    Sincerely,   Oumou Ramsey MD

## 2022-02-16 NOTE — PATIENT INSTRUCTIONS
Colonoscopy : [phone]    You can get a shingles vaccine, but currently the old one is not recommended anymore. Shingrix vaccine is the new shingles vaccine. You can ask at your local pharmacy.  It is 2 shots, and is not a live vaccine. It is usually  covered by insurance. It is 90 % effective against Shingles. You can start it now at age 50.  It is currently on backorder at some pharmacies and thus it may be difficult to get the 2nd shot.  You do not need a prescription to receive it.     Please let me know if you have further questions.    Sincerely,   Oumou Lopez MD

## 2022-03-02 ENCOUNTER — PATIENT MESSAGE (OUTPATIENT)
Dept: RESEARCH | Facility: HOSPITAL | Age: 57
End: 2022-03-02
Payer: OTHER GOVERNMENT

## 2022-03-10 ENCOUNTER — HOSPITAL ENCOUNTER (OUTPATIENT)
Dept: RADIOLOGY | Facility: HOSPITAL | Age: 57
Discharge: HOME | End: 2022-03-10
Attending: FAMILY MEDICINE
Payer: OTHER GOVERNMENT

## 2022-03-10 DIAGNOSIS — Z12.31 OTHER SCREENING MAMMOGRAM: ICD-10-CM

## 2022-03-10 PROCEDURE — 77067 SCR MAMMO BI INCL CAD: CPT | Mod: 26,,, | Performed by: RADIOLOGY

## 2022-03-10 PROCEDURE — 77067 SCR MAMMO BI INCL CAD: CPT | Mod: TC

## 2022-03-10 PROCEDURE — 77063 MAMMO DIGITAL SCREENING BILAT WITH TOMO: ICD-10-PCS | Mod: 26,,, | Performed by: RADIOLOGY

## 2022-03-10 PROCEDURE — 77067 MAMMO DIGITAL SCREENING BILAT WITH TOMO: ICD-10-PCS | Mod: 26,,, | Performed by: RADIOLOGY

## 2022-03-10 PROCEDURE — 77063 BREAST TOMOSYNTHESIS BI: CPT | Mod: 26,,, | Performed by: RADIOLOGY

## 2022-03-17 ENCOUNTER — PATIENT OUTREACH (OUTPATIENT)
Dept: ADMINISTRATIVE | Facility: OTHER | Age: 57
End: 2022-03-17
Payer: OTHER GOVERNMENT

## 2022-03-17 ENCOUNTER — OFFICE VISIT (OUTPATIENT)
Dept: OBSTETRICS AND GYNECOLOGY | Facility: CLINIC | Age: 57
End: 2022-03-17
Payer: OTHER GOVERNMENT

## 2022-03-17 VITALS — DIASTOLIC BLOOD PRESSURE: 82 MMHG | SYSTOLIC BLOOD PRESSURE: 130 MMHG | BODY MASS INDEX: 35.62 KG/M2 | WEIGHT: 188.5 LBS

## 2022-03-17 DIAGNOSIS — Z01.419 WELL WOMAN EXAM WITH ROUTINE GYNECOLOGICAL EXAM: Primary | ICD-10-CM

## 2022-03-17 PROCEDURE — 99999 PR PBB SHADOW E&M-EST. PATIENT-LVL III: CPT | Mod: PBBFAC,,, | Performed by: NURSE PRACTITIONER

## 2022-03-17 PROCEDURE — 99999 PR PBB SHADOW E&M-EST. PATIENT-LVL III: ICD-10-PCS | Mod: PBBFAC,,, | Performed by: NURSE PRACTITIONER

## 2022-03-17 PROCEDURE — 99213 OFFICE O/P EST LOW 20 MIN: CPT | Mod: PBBFAC | Performed by: NURSE PRACTITIONER

## 2022-03-17 PROCEDURE — 99396 PREV VISIT EST AGE 40-64: CPT | Mod: S$PBB,,, | Performed by: NURSE PRACTITIONER

## 2022-03-17 PROCEDURE — 99396 PR PREVENTIVE VISIT,EST,40-64: ICD-10-PCS | Mod: S$PBB,,, | Performed by: NURSE PRACTITIONER

## 2022-03-17 NOTE — PROGRESS NOTES
Subjective:       Patient ID: Tova Muñoz is a 57 y.o. female.    Chief Complaint:  No chief complaint on file.    Patient's last menstrual period was 2018.  History of Present Illness  Annual Exam-Postmenopausal  Patient presents for annual exam. The patient has no complaints today. The patient is sexually active. GYN screening history: last pap: approximate date 12/10/20 and was normal and last mammogram: approximate date 3/10/22 and was normal. The patient is not taking hormone replacement therapy. Patient denies post-menopausal vaginal bleeding. The patient wears seatbelts: yes. The patient participates in regular exercise: no. Has the patient ever been transfused or tattooed?: yes. The patient reports that there is not domestic violence in her life.    OB History    Para Term  AB Living   4 4 2     2   SAB IAB Ectopic Multiple Live Births           2      # Outcome Date GA Lbr Uriel/2nd Weight Sex Delivery Anes PTL Lv   4 Para      CS-Unspec   TOI   3 Para      CS-Unspec   TOI   2 Term            1 Term                Review of Systems  Review of Systems   Constitutional: Negative for appetite change, fatigue, fever and unexpected weight change.   Eyes: Negative for visual disturbance.   Cardiovascular: Negative for chest pain.   Gastrointestinal: Negative for abdominal pain, bloating, constipation, diarrhea, nausea and vomiting.   Genitourinary: Negative for bladder incontinence, dysmenorrhea, dyspareunia, dysuria, flank pain, frequency, genital sores, menorrhagia, menstrual problem, pelvic pain, urgency, vaginal bleeding, vaginal discharge, vaginal pain, postcoital bleeding, vaginal dryness and vaginal odor.   Integumentary:  Negative for rash, acne, mole/lesion, breast mass, nipple discharge, breast skin changes and breast tenderness.   Neurological: Negative for syncope and headaches.   Hematological: Negative for adenopathy. Does not bruise/bleed easily.   All other systems reviewed  and are negative.  Breast: Positive for breast self exam.Negative for asymmetry, lump, mass, nipple discharge, skin changes and tenderness           Objective:      Physical Exam:   Constitutional: She is oriented to person, place, and time. She appears well-developed and well-nourished.    HENT:   Head: Normocephalic and atraumatic.    Eyes: Pupils are equal, round, and reactive to light. Conjunctivae and EOM are normal.     Cardiovascular: Normal rate and regular rhythm.     Pulmonary/Chest: Effort normal. Right breast exhibits no inverted nipple, no mass, no nipple discharge, no skin change, no tenderness, no bleeding and no swelling. Left breast exhibits no inverted nipple, no mass, no nipple discharge, no skin change, no tenderness, no bleeding and no swelling. Breasts are symmetrical.        Abdominal: Soft. Hernia confirmed negative in the right inguinal area and confirmed negative in the left inguinal area.     Genitourinary:    Inguinal canal, vagina, uterus, right adnexa, left adnexa and rectum normal.      Pelvic exam was performed with patient supine.   The external female genitalia was normal.   Genitalia hair distrobution normal .   Labial bartholins normal.There is no rash, tenderness, lesion or injury on the right labia. There is no rash, tenderness, lesion or injury on the left labia. Cervix is normal. No erythema,  no vaginal discharge, bleeding, rectocele, cystocele or unspecified prolapse of vaginal walls in the vagina. Cervix exhibits no motion tenderness and no friability. Uterus is not tender.           Musculoskeletal: Normal range of motion and moves all extremeties.      Lymphadenopathy: No inguinal adenopathy noted on the right or left side.    Neurological: She is alert and oriented to person, place, and time.    Skin: Skin is warm and dry. No rash noted. No erythema.    Psychiatric: She has a normal mood and affect. Her behavior is normal. Judgment and thought content normal.             Assessment:     1. Well woman exam with routine gynecological exam              Plan:   Diagnoses and all orders for this visit:    Well woman exam with routine gynecological exam      Follow up with me in 1 year for annual well woman exam.

## 2022-04-21 ENCOUNTER — TELEPHONE (OUTPATIENT)
Dept: ADMINISTRATIVE | Facility: HOSPITAL | Age: 57
End: 2022-04-21
Payer: OTHER GOVERNMENT

## 2022-04-21 DIAGNOSIS — Z12.11 COLON CANCER SCREENING: Primary | ICD-10-CM

## 2022-05-26 ENCOUNTER — HOSPITAL ENCOUNTER (OUTPATIENT)
Dept: PREADMISSION TESTING | Facility: HOSPITAL | Age: 57
Discharge: HOME | End: 2022-05-26
Attending: FAMILY MEDICINE
Payer: OTHER GOVERNMENT

## 2022-05-26 DIAGNOSIS — Z12.11 COLON CANCER SCREENING: Primary | ICD-10-CM

## 2022-05-26 RX ORDER — POLYETHYLENE GLYCOL 3350, SODIUM SULFATE ANHYDROUS, SODIUM BICARBONATE, SODIUM CHLORIDE, POTASSIUM CHLORIDE 236; 22.74; 6.74; 5.86; 2.97 G/4L; G/4L; G/4L; G/4L; G/4L
4 POWDER, FOR SOLUTION ORAL ONCE
Qty: 4000 ML | Refills: 0 | Status: SHIPPED | OUTPATIENT
Start: 2022-05-26 | End: 2022-05-26

## 2022-08-15 ENCOUNTER — ANESTHESIA (OUTPATIENT)
Dept: ENDOSCOPY | Facility: HOSPITAL | Age: 57
End: 2022-08-15
Payer: OTHER GOVERNMENT

## 2022-08-15 ENCOUNTER — ANESTHESIA EVENT (OUTPATIENT)
Dept: ENDOSCOPY | Facility: HOSPITAL | Age: 57
End: 2022-08-15
Payer: OTHER GOVERNMENT

## 2022-08-15 ENCOUNTER — HOSPITAL ENCOUNTER (OUTPATIENT)
Facility: HOSPITAL | Age: 57
Discharge: HOME | End: 2022-08-15
Attending: INTERNAL MEDICINE | Admitting: INTERNAL MEDICINE
Payer: OTHER GOVERNMENT

## 2022-08-15 VITALS
BODY MASS INDEX: 34.93 KG/M2 | RESPIRATION RATE: 17 BRPM | TEMPERATURE: 98 F | DIASTOLIC BLOOD PRESSURE: 81 MMHG | WEIGHT: 185 LBS | OXYGEN SATURATION: 98 % | HEIGHT: 61 IN | SYSTOLIC BLOOD PRESSURE: 129 MMHG | HEART RATE: 62 BPM

## 2022-08-15 DIAGNOSIS — K63.5 COLON POLYPS: ICD-10-CM

## 2022-08-15 DIAGNOSIS — K63.5 POLYP OF COLON, UNSPECIFIED PART OF COLON, UNSPECIFIED TYPE: Primary | ICD-10-CM

## 2022-08-15 PROCEDURE — 25000003 PHARM REV CODE 250: Performed by: NURSE ANESTHETIST, CERTIFIED REGISTERED

## 2022-08-15 PROCEDURE — 88305 TISSUE EXAM BY PATHOLOGIST: CPT | Mod: 26,,, | Performed by: PATHOLOGY

## 2022-08-15 PROCEDURE — 45385 PR COLONOSCOPY,REMV LESN,SNARE: ICD-10-PCS | Mod: PT,,, | Performed by: INTERNAL MEDICINE

## 2022-08-15 PROCEDURE — 37000008 HC ANESTHESIA 1ST 15 MINUTES: Performed by: INTERNAL MEDICINE

## 2022-08-15 PROCEDURE — 45385 COLONOSCOPY W/LESION REMOVAL: CPT | Mod: PT,,, | Performed by: INTERNAL MEDICINE

## 2022-08-15 PROCEDURE — 25000003 PHARM REV CODE 250: Performed by: INTERNAL MEDICINE

## 2022-08-15 PROCEDURE — 88305 TISSUE EXAM BY PATHOLOGIST: ICD-10-PCS | Mod: 26,,, | Performed by: PATHOLOGY

## 2022-08-15 PROCEDURE — 00811 ANES LWR INTST NDSC NOS: CPT | Performed by: INTERNAL MEDICINE

## 2022-08-15 PROCEDURE — 45385 COLONOSCOPY W/LESION REMOVAL: CPT | Performed by: INTERNAL MEDICINE

## 2022-08-15 PROCEDURE — 37000009 HC ANESTHESIA EA ADD 15 MINS: Performed by: INTERNAL MEDICINE

## 2022-08-15 PROCEDURE — 27200997: Performed by: INTERNAL MEDICINE

## 2022-08-15 PROCEDURE — 88305 TISSUE EXAM BY PATHOLOGIST: CPT | Performed by: PATHOLOGY

## 2022-08-15 PROCEDURE — 27201089 HC SNARE, DISP (ANY): Performed by: INTERNAL MEDICINE

## 2022-08-15 PROCEDURE — 63600175 PHARM REV CODE 636 W HCPCS: Performed by: NURSE ANESTHETIST, CERTIFIED REGISTERED

## 2022-08-15 RX ORDER — PROPOFOL 10 MG/ML
VIAL (ML) INTRAVENOUS
Status: DISCONTINUED | OUTPATIENT
Start: 2022-08-15 | End: 2022-08-15

## 2022-08-15 RX ORDER — LIDOCAINE HYDROCHLORIDE 10 MG/ML
INJECTION, SOLUTION EPIDURAL; INFILTRATION; INTRACAUDAL; PERINEURAL
Status: DISCONTINUED | OUTPATIENT
Start: 2022-08-15 | End: 2022-08-15

## 2022-08-15 RX ORDER — DEXTROMETHORPHAN/PSEUDOEPHED 2.5-7.5/.8
DROPS ORAL
Status: COMPLETED | OUTPATIENT
Start: 2022-08-15 | End: 2022-08-15

## 2022-08-15 RX ADMIN — PROPOFOL 100 MG: 10 INJECTION, EMULSION INTRAVENOUS at 07:08

## 2022-08-15 RX ADMIN — PROPOFOL 50 MG: 10 INJECTION, EMULSION INTRAVENOUS at 07:08

## 2022-08-15 RX ADMIN — SODIUM CHLORIDE, SODIUM LACTATE, POTASSIUM CHLORIDE, AND CALCIUM CHLORIDE: .6; .31; .03; .02 INJECTION, SOLUTION INTRAVENOUS at 07:08

## 2022-08-15 RX ADMIN — SIMETHICONE 200 MG: 20 SUSPENSION/ DROPS ORAL at 07:08

## 2022-08-15 RX ADMIN — LIDOCAINE HYDROCHLORIDE 50 MG: 10 INJECTION, SOLUTION EPIDURAL; INFILTRATION; INTRACAUDAL; PERINEURAL at 07:08

## 2022-08-15 NOTE — H&P
PRE PROCEDURE H&P    Patient Name: Tova Muñoz  MRN: [id number]  : 1965  Date of Procedure:  8/15/2022  Referring Physician: Oumou Lopez MD  Primary Physician: Oumou Lopez MD  Procedure Physician: Shayy William MD       Planned Procedure: Colonoscopy  Diagnosis: previous adenomatous polyp  Chief Complaint: Same as above    HPI: Patient is an 57 y.o. female is here for the above.     Last colonoscopy:   Family history: None   Anticoagulation: none     Past Medical History:   Past Medical History:   Diagnosis Date    Allergy     BCC (basal cell carcinoma of skin)     nose    Colon polyp     Migraine headache     Sinusitis         Past Surgical History:  Past Surgical History:   Procedure Laterality Date    AUGMENTATION OF BREAST          BASAL CELL CARCINOMA EXCISION  2015    breast augumentation      BREAST SURGERY Bilateral 1989    saline implants     SECTION      x2    COLONOSCOPY N/A 2016    Procedure: COLONOSCOPY;  Surgeon: Dali Rodriguez MD;  [facility];  Service: Endoscopy;  Laterality: N/A;    LASIX      SKIN BIOPSY      TUBAL LIGATION          Home Medications:  Prior to Admission medications    Medication Sig Start Date End Date Taking? Authorizing Provider   cetirizine (ZYRTEC) 10 MG tablet Take 10 mg by mouth once daily.   Yes Historical Provider   citalopram (CELEXA) 20 MG tablet Take 1 tablet (20 mg total) by mouth once daily. 22  Yes Oumou Lopez MD   meloxicam (MOBIC) 15 MG tablet TAKE 1 TABLET (15 MG TOTAL) BY MOUTH DAILY AS NEEDED FOR PAIN (ARTHRITIS). 8/10/22  Yes Oumou Lopez MD   multivit-min/folic acid/biotin (HAIR,SKIN AND NAILS,FA-BIOTIN, ORAL) Take by mouth.   Yes Historical Provider   clobetasol 0.05% (TEMOVATE) 0.05 % Oint Apply topically 2 (two) times daily. To nail beds 21   THANIA Olivares   crisaborole (EUCRISA) 2 % Oint AAA bid prn.  Non-steroid.  Safe to use long-term. 22   Glenis Pena MD    TREXIMET  mg Tab Take 1 tablet by mouth continuous prn. 10/9/18   Historical Provider   UNABLE TO FIND move free advanced MSM    Historical Provider        Allergies:  Review of patient's allergies indicates:   Allergen Reactions    Sulfa (sulfonamide antibiotics) Swelling        Social History:   Social History     Socioeconomic History    Marital status:    Occupational History    Occupation: Insurance    Tobacco Use    Smoking status: Never Smoker    Smokeless tobacco: Never Used   Substance and Sexual Activity    Alcohol use: Yes     Alcohol/week: 2.0 standard drinks     Types: 2 Cans of beer per week    Drug use: No    Sexual activity: Yes     Partners: Male     Birth control/protection: Surgical   Other Topics Concern    Are you pregnant or think you may be? No    Breast-feeding No     Social Determinants of Health     Financial Resource Strain: Low Risk     Difficulty of Paying Living Expenses: Not hard at all   Food Insecurity: No Food Insecurity    Worried About Running Out of Food in the Last Year: Never true    Ran Out of Food in the Last Year: Never true   Transportation Needs: No Transportation Needs    Lack of Transportation (Medical): No    Lack of Transportation (Non-Medical): No   Physical Activity: Unknown    Days of Exercise per Week: Patient refused   Stress: Stress Concern Present    Feeling of Stress : Very much   Social Connections: Unknown    Frequency of Communication with Friends and Family: Patient refused    Frequency of Social Gatherings with Friends and Family: Patient refused    Active Member of Clubs or Organizations: Patient refused    Attends Club or Organization Meetings: Patient refused    Marital Status:    Housing Stability: Unknown    Unable to Pay for Housing in the Last Year: Patient refused    Number of Places Lived in the Last Year: 1    Unstable Housing in the Last Year: Patient refused       Family History:  Family  "History   Problem Relation Age of Onset    Asthma Mother     Diabetes Mother     Emphysema Mother     Hypertension Mother     Basal cell carcinoma Mother     Melanoma Mother     Asthma Sister     Cancer Sister         precancerous cervical cells removed    Basal cell carcinoma Sister     Vulvar Cancer Sister     Heart disease Father 75        CHF    Cancer Paternal Grandmother         uterine    Ovarian cancer Paternal Grandmother     Eczema Neg Hx     Lupus Neg Hx     Psoriasis Neg Hx        ROS: No acute cardiac events, no acute respiratory complaints.     Physical Exam (all patients):    /60 (BP Location: Left arm, Patient Position: Lying)   Pulse 69   Temp 98.4 °F (36.9 °C) (Temporal)   Resp 16   Ht 5' 1" (1.549 m)   Wt 83.9 kg (185 lb)   LMP 08/06/2018   SpO2 95%   Breastfeeding No   BMI 34.96 kg/m²   Lungs: Clear to auscultation bilaterally, respirations unlabored  Heart: Regular rate and rhythm, S1 and S2 normal, no obvious murmurs  Abdomen:         Soft, non-tender, bowel sounds normal, no masses, no organomegaly    Lab Results   Component Value Date    WBC 3.42 (L) 02/10/2022     (H) 02/10/2022    RDW 13.0 02/10/2022     (L) 02/10/2022    GLU 92 02/10/2022    HGBA1C 5.3 02/10/2022    BUN 15 02/10/2022     02/10/2022    K 4.2 02/10/2022     02/10/2022        SEDATION PLAN: per anesthesia      History reviewed, vital signs satisfactory, cardiopulmonary status satisfactory, sedation options, risks and plans have been discussed with the patient  All their questions were answered and the patient agrees to the sedation procedures as planned and the patient is deemed an appropriate candidate for the sedation as planned.    Procedure explained to patient, informed consent obtained and placed in chart.    Shayy William  8/15/2022  7:07 AM     "

## 2022-08-15 NOTE — ANESTHESIA POSTPROCEDURE EVALUATION
Anesthesia Post Evaluation    Patient: Tova Muñoz    Procedure(s) Performed: Procedure(s) (LRB):  COLONOSCOPY (N/A)    Final Anesthesia Type: MAC      Patient location during evaluation: GI PACU  Patient participation: No - Unable to Participate, Intubation  Level of consciousness: awake and alert  Post-procedure vital signs: reviewed and stable  Pain management: adequate  Airway patency: patent    PONV status at discharge: No PONV  Anesthetic complications: no      Cardiovascular status: blood pressure returned to baseline  Respiratory status: unassisted and spontaneous ventilation  Hydration status: euvolemic  Follow-up not needed.          Vitals Value Taken Time   /74 08/15/22 0747   Temp 36.5 °C (97.7 °F) 08/15/22 0737   Pulse 61 08/15/22 0747   Resp 15 08/15/22 0747   SpO2 96 % 08/15/22 0747         No case tracking events are documented in the log.      Pain/Sofie Score: Sofie Score: 9 (8/15/2022  7:47 AM)

## 2022-08-15 NOTE — PROVATION PATIENT INSTRUCTIONS
Discharge Summary/Instructions after an Endoscopic Procedure  Patient Name: Tova Muñoz  Patient MRN: [id number]  Patient YOB: 1965  Monday, August 15, 2022 Shayy William MD  Dear patient,  As a result of recent federal legislation (The Federal Cures Act), you may   receive lab or pathology results from your procedure in your [facility]   account before your physician is able to contact you. Your physician or   their representative will relay the results to you with their   recommendations at their soonest availability.  Thank you,  RESTRICTIONS:  During your procedure today, you received medications for sedation.  These   medications may affect your judgment, balance and coordination.  Therefore,   for 24 hours, you have the following restrictions:   - DO NOT drive a car, operate machinery, make legal/financial decisions,   sign important papers or drink alcohol.    ACTIVITY:  Today: no heavy lifting, straining or running due to procedural   sedation/anesthesia.  The following day: return to full activity including work.  DIET:  Eat and drink normally unless instructed otherwise.     TREATMENT FOR COMMON SIDE EFFECTS:  - Mild abdominal pain, nausea, belching, bloating or excessive gas:  rest,   eat lightly and use a heating pad.  - Sore Throat: treat with throat lozenges and/or gargle with warm salt   water.  - Because air was used during the procedure, expelling large amounts of air   from your rectum or belching is normal.  - If a bowel prep was taken, you may not have a bowel movement for 1-3 days.    This is normal.  SYMPTOMS TO WATCH FOR AND REPORT TO YOUR PHYSICIAN:  1. Abdominal pain or bloating, other than gas cramps.  2. Chest pain.  3. Back pain.  4. Signs of infection such as: chills or fever occurring within 24 hours   after the procedure.  5. Rectal bleeding, which would show as bright red, maroon, or black stools.   (A tablespoon of blood from the rectum is not serious, especially  if   hemorrhoids are present.)  6. Vomiting.  7. Weakness or dizziness.  GO DIRECTLY TO THE NEAREST EMERGENCY ROOM IF YOU HAVE ANY OF THE FOLLOWING:      Difficulty breathing              Chills and/or fever over 101 F   Persistent vomiting and/or vomiting blood   Severe abdominal pain   Severe chest pain   Black, tarry stools   Bleeding- more than one tablespoon   Any other symptom or condition that you feel may need urgent attention  Your doctor recommends these additional instructions:  If any biopsies were taken, your doctors clinic will contact you in 1 to 2   weeks with any results.  - Discharge patient to home.   - Resume previous diet.   - Continue present medications.   - Await pathology results.   - Repeat colonoscopy in 3 years for surveillance.   - Return to referring physician.   - Patient has a contact number available for emergencies.  The signs and   symptoms of potential delayed complications were discussed with the   patient.  Return to normal activities tomorrow.  Written discharge   instructions were provided to the patient.  For questions, problems or results please call your physician Shayy William MD at Work:  [phone]  If you have any questions about the above instructions, call the GI   department at [phone] or call the endoscopy unit at [phone]   from 7am until 3 pm.  [facility] [place], EMERGENCY ROOM PHONE NUMBER:   [phone]  IF A COMPLICATION OR EMERGENCY SITUATION ARISES AND YOU ARE UNABLE TO REACH   YOUR PHYSICIAN - GO DIRECTLY TO THE EMERGENCY ROOM.  I have read or have had read to me these discharge instructions for my   procedure and have received a written copy.  I understand these   instructions and will follow-up with my physician if I have any questions.     __________________________________       _____________________________________  Nurse Signature                                          Patient/Designated   Responsible  Party Signature  Shayy William MD  8/15/2022 7:36:16 AM  This report has been verified and signed electronically.  Dear patient,  As a result of recent federal legislation (The Federal Cures Act), you may   receive lab or pathology results from your procedure in your [facility]   account before your physician is able to contact you. Your physician or   their representative will relay the results to you with their   recommendations at their soonest availability.  Thank you,  PROVATION

## 2022-08-15 NOTE — ANESTHESIA PREPROCEDURE EVALUATION
08/15/2022  Tova Muñoz is a 57 y.o., female.    Pre-op Assessment    I have reviewed the Patient Summary Reports.    I have reviewed the Nursing Notes. I have reviewed the NPO Status.   I have reviewed the Medications.     Review of Systems  Anesthesia Hx:  No problems with previous Anesthesia  Denies Family Hx of Anesthesia complications.   Denies Personal Hx of Anesthesia complications.   Social:  Non-Smoker, Alcohol Use Social alcohol use   Hematology/Oncology:  Hematology Normal      Oncology Comments: Basal cell removed from nose in July 2015    Cardiovascular:   Exercise tolerance: good Denies Hypertension.   Denies CHF.    Pulmonary:  Pulmonary Normal +snore   Renal/:  Renal/ Normal     Hepatic/GI:  Hepatic/GI Normal    Musculoskeletal:  Musculoskeletal Normal    Neurological:   Denies CVA. Headaches Denies Seizures.    Endocrine:  Endocrine Normal  Obesity / BMI > 30  Psych:   Psychiatric History anxiety          Physical Exam  General:  Well nourished      Airway/Jaw/Neck:  Airway Findings: Mouth Opening: Normal   Tongue: Normal   Pre-Existing Airway Tube(s): Oral Endotracheal tube General Airway Assessment: Adult  Mallampati: II  TM Distance: Normal   Neck ROM: Normal ROM       Dental:  Dental Findings: In tact     Chest/Lungs:  Chest/Lungs Findings: Normal Respiratory Rate      Heart/Vascular:  Heart Findings: Rate: Normal  Rhythm: Regular Rhythm  Sounds: Normal             Anesthesia Plan  Type of Anesthesia, risks & benefits discussed:  Anesthesia Type:  MAC    Patient's Preference:   Plan Factors:          Intra-op Monitoring Plan: Standard ASA Monitors  Intra-op Monitoring Plan Comments:   Post Op Pain Control Plan: multimodal analgesia  Post Op Pain Control Plan Comments:     Induction:   IV  Beta Blocker:  Patient is not currently on a Beta-Blocker (No further documentation  required).       Informed Consent: Informed consent signed with the Patient and all parties understand the risks and agree with anesthesia plan.  All questions answered.  Anesthesia consent signed with patient.  ASA Score: 2     Day of Surgery Review of History & Physical: I have interviewed and examined the patient. I have reviewed the patient's H&P dated:  There are no significant changes.  H&P Update referred to the surgeon/provider.          Ready For Surgery From Anesthesia Perspective.           Physical Exam  General: Well nourished    Airway:  Mallampati: II   Mouth Opening: Normal  TM Distance: Normal  Tongue: Normal  Neck ROM: Normal ROM  Pre-Existing Airway: Oral Endotracheal tube    Dental:  In tact    Chest/Lungs:  Normal Respiratory Rate    Heart:  Rate: Normal  Rhythm: Regular Rhythm  Sounds: Normal          Anesthesia Plan  Type of Anesthesia, risks & benefits discussed:    Anesthesia Type: MAC  Intra-op Monitoring Plan: Standard ASA Monitors  Post Op Pain Control Plan: multimodal analgesia  Induction:  IV  Informed Consent: Informed consent signed with the Patient and all parties understand the risks and agree with anesthesia plan.  All questions answered.   ASA Score: 2  Day of Surgery Review of History & Physical: H&P Update referred to the surgeon/provider.I have interviewed and examined the patient. I have reviewed the patient's H&P dated: There are no significant changes.     Ready For Surgery From Anesthesia Perspective.       .

## 2022-08-15 NOTE — TRANSFER OF CARE
"Anesthesia Transfer of Care Note    Patient: Tova Muñoz    Procedure(s) Performed: Procedure(s) (LRB):  COLONOSCOPY (N/A)    Patient location: GI    Anesthesia Type: MAC    Transport from OR: Transported from OR on room air with adequate spontaneous ventilation    Post pain: adequate analgesia    Post assessment: no apparent anesthetic complications    Post vital signs: stable    Level of consciousness: sedated    Nausea/Vomiting: no nausea/vomiting    Complications: none    Transfer of care protocol was followed      Last vitals:   Visit Vitals  /60 (BP Location: Left arm, Patient Position: Lying)   Pulse 69   Temp 36.9 °C (98.4 °F) (Temporal)   Resp 16   Ht 5' 1" (1.549 m)   Wt 83.9 kg (185 lb)   LMP 08/06/2018   SpO2 95%   Breastfeeding No   BMI 34.96 kg/m²     "

## 2022-08-18 LAB
FINAL PATHOLOGIC DIAGNOSIS: NORMAL
GROSS: NORMAL
Lab: NORMAL

## 2023-02-28 ENCOUNTER — OFFICE VISIT (OUTPATIENT)
Dept: INTERNAL MEDICINE | Facility: CLINIC | Age: 58
End: 2023-02-28
Payer: OTHER GOVERNMENT

## 2023-02-28 VITALS — HEART RATE: 72 BPM | WEIGHT: 189.81 LBS | BODY MASS INDEX: 35.84 KG/M2 | HEIGHT: 61 IN | OXYGEN SATURATION: 97 %

## 2023-02-28 DIAGNOSIS — F41.8 MIXED ANXIETY DEPRESSIVE DISORDER: Primary | ICD-10-CM

## 2023-02-28 DIAGNOSIS — E66.09 CLASS 2 OBESITY DUE TO EXCESS CALORIES WITHOUT SERIOUS COMORBIDITY WITH BODY MASS INDEX (BMI) OF 35.0 TO 35.9 IN ADULT: ICD-10-CM

## 2023-02-28 PROCEDURE — 99999 PR PBB SHADOW E&M-EST. PATIENT-LVL III: CPT | Mod: PBBFAC,,, | Performed by: NURSE PRACTITIONER

## 2023-02-28 PROCEDURE — 99214 OFFICE O/P EST MOD 30 MIN: CPT | Mod: S$PBB,,, | Performed by: NURSE PRACTITIONER

## 2023-02-28 PROCEDURE — 99214 PR OFFICE/OUTPT VISIT, EST, LEVL IV, 30-39 MIN: ICD-10-PCS | Mod: S$PBB,,, | Performed by: NURSE PRACTITIONER

## 2023-02-28 PROCEDURE — 99999 PR PBB SHADOW E&M-EST. PATIENT-LVL III: ICD-10-PCS | Mod: PBBFAC,,, | Performed by: NURSE PRACTITIONER

## 2023-02-28 PROCEDURE — 99213 OFFICE O/P EST LOW 20 MIN: CPT | Mod: PBBFAC,PO | Performed by: NURSE PRACTITIONER

## 2023-02-28 RX ORDER — CITALOPRAM 20 MG/1
20 TABLET, FILM COATED ORAL DAILY
Qty: 90 TABLET | Refills: 0 | Status: SHIPPED | OUTPATIENT
Start: 2023-02-28 | End: 2023-03-01 | Stop reason: SDUPTHER

## 2023-02-28 NOTE — PROGRESS NOTES
"Subjective:       Patient ID: Tova Muñoz is a 58 y.o. female.    Chief Complaint: Follow-up and Medication Refill    58 year old female here for med refill  On celexa for sushma; stable  Frustrated with weight, having joint pains, not watching diet, not drinking enough water, not exercising because she does not have time  Sits for her job for 8+ hours a day  Patient states she is not able to get in with her Primary Care Physician until June    Follow-up  Pertinent negatives include no chest pain, chills, coughing, diaphoresis, fatigue, fever or rash.   Medication Refill  Pertinent negatives include no chest pain, chills, coughing, diaphoresis, fatigue, fever or rash.   Pulse 72   Ht 5' 1" (1.549 m)   Wt 86.1 kg (189 lb 13.1 oz)   LMP 08/06/2018   SpO2 97%   BMI 35.87 kg/m²     Review of Systems   Constitutional:  Negative for activity change, appetite change, chills, diaphoresis, fatigue, fever and unexpected weight change.   HENT: Negative.     Respiratory:  Negative for cough and shortness of breath.    Cardiovascular:  Negative for chest pain, palpitations and leg swelling.   Gastrointestinal: Negative.    Genitourinary: Negative.    Musculoskeletal: Negative.    Skin:  Negative for color change, pallor, rash and wound.   Allergic/Immunologic: Negative for immunocompromised state.   Neurological: Negative.  Negative for dizziness and facial asymmetry.   Hematological:  Negative for adenopathy. Does not bruise/bleed easily.   Psychiatric/Behavioral:  Negative for agitation, behavioral problems and confusion.      Objective:      Physical Exam  Vitals and nursing note reviewed.   Constitutional:       General: She is not in acute distress.     Appearance: Normal appearance. She is well-developed. She is not diaphoretic.   HENT:      Head: Normocephalic and atraumatic.   Cardiovascular:      Rate and Rhythm: Normal rate and regular rhythm.      Heart sounds: Normal heart sounds. No murmur heard.  Pulmonary: "      Effort: Pulmonary effort is normal. No respiratory distress.      Breath sounds: Normal breath sounds.   Musculoskeletal:         General: Normal range of motion.   Skin:     General: Skin is warm and dry.      Findings: No rash.   Neurological:      Mental Status: She is alert.   Psychiatric:         Mood and Affect: Mood normal.         Behavior: Behavior normal. Behavior is cooperative.         Thought Content: Thought content normal.         Judgment: Judgment normal.       Assessment:       1. Mixed anxiety depressive disorder    2. Class 2 obesity due to excess calories without serious comorbidity with body mass index (BMI) of 35.0 to 35.9 in adult        Plan:       Tova was seen today for follow-up and medication refill.    Diagnoses and all orders for this visit:    Mixed anxiety depressive disorder  -     citalopram (CELEXA) 20 MG tablet; Take 1 tablet (20 mg total) by mouth once daily.  Stable; refill celexa    Class 2 obesity due to excess calories without serious comorbidity with body mass index (BMI) of 35.0 to 35.9 in adult  Not controlled  Discussed my fitness pal tracking goal is 1200 cals a day; focus on lean proteins and veggies  Increase water to 1/2 of body weight in oz daily  Diet / lifestyle changes strongly advised    Due for annual and labs with Primary Care Physician

## 2023-03-01 ENCOUNTER — PATIENT MESSAGE (OUTPATIENT)
Dept: PRIMARY CARE CLINIC | Facility: CLINIC | Age: 58
End: 2023-03-01
Payer: OTHER GOVERNMENT

## 2023-03-01 ENCOUNTER — OFFICE VISIT (OUTPATIENT)
Dept: PRIMARY CARE CLINIC | Facility: CLINIC | Age: 58
End: 2023-03-01
Payer: OTHER GOVERNMENT

## 2023-03-01 VITALS
SYSTOLIC BLOOD PRESSURE: 124 MMHG | DIASTOLIC BLOOD PRESSURE: 86 MMHG | HEIGHT: 61 IN | WEIGHT: 193.44 LBS | TEMPERATURE: 99 F | HEART RATE: 83 BPM | BODY MASS INDEX: 36.52 KG/M2

## 2023-03-01 DIAGNOSIS — Z00.00 ROUTINE GENERAL MEDICAL EXAMINATION AT A HEALTH CARE FACILITY: Primary | ICD-10-CM

## 2023-03-01 DIAGNOSIS — F41.8 MIXED ANXIETY DEPRESSIVE DISORDER: ICD-10-CM

## 2023-03-01 DIAGNOSIS — E66.9 CLASS 1 OBESITY WITH BODY MASS INDEX (BMI) OF 32.0 TO 32.9 IN ADULT, UNSPECIFIED OBESITY TYPE, UNSPECIFIED WHETHER SERIOUS COMORBIDITY PRESENT: ICD-10-CM

## 2023-03-01 DIAGNOSIS — N95.1 HOT FLASHES DUE TO MENOPAUSE: ICD-10-CM

## 2023-03-01 DIAGNOSIS — R03.0 ELEVATED BP WITHOUT DIAGNOSIS OF HYPERTENSION: ICD-10-CM

## 2023-03-01 PROCEDURE — 99999 PR PBB SHADOW E&M-EST. PATIENT-LVL IV: ICD-10-PCS | Mod: PBBFAC,,, | Performed by: FAMILY MEDICINE

## 2023-03-01 PROCEDURE — 99999 PR PBB SHADOW E&M-EST. PATIENT-LVL IV: CPT | Mod: PBBFAC,,, | Performed by: FAMILY MEDICINE

## 2023-03-01 PROCEDURE — 99396 PR PREVENTIVE VISIT,EST,40-64: ICD-10-PCS | Mod: S$PBB,,, | Performed by: FAMILY MEDICINE

## 2023-03-01 PROCEDURE — 99214 OFFICE O/P EST MOD 30 MIN: CPT | Mod: PBBFAC,PN | Performed by: FAMILY MEDICINE

## 2023-03-01 PROCEDURE — 99396 PREV VISIT EST AGE 40-64: CPT | Mod: S$PBB,,, | Performed by: FAMILY MEDICINE

## 2023-03-01 RX ORDER — CITALOPRAM 20 MG/1
20 TABLET, FILM COATED ORAL DAILY
Qty: 90 TABLET | Refills: 3 | Status: SHIPPED | OUTPATIENT
Start: 2023-03-01 | End: 2024-02-29 | Stop reason: SDUPTHER

## 2023-03-01 NOTE — PROGRESS NOTES
Subjective:      Patient ID: Tova Muñoz is a 58 y.o. female.    Chief Complaint: Annual Exam      Patient is a 58 y.o. female coming in today  has a past medical history of Allergy, BCC (basal cell carcinoma of skin), Colon polyp, Migraine headache, and Sinusitis.    Pt presents to clinic today for annual exam.  Currently on Celexa and Zyrtec. Pt today reports she has been dealing with anxiety increase. States she feels fatigued all the time. Work is one main factor that exacerbates anxiety. Pt reports sleep disturbance associated with anxiety. Pt is also concerned with losing weight; reports she has not been able to lose weight despite some dietary changes. Reports being stable with Tylenol for pain treatment.     [facility] Reason For Visit    2/28/2023  8:32 PM CST - Filed by Patient   What is your primary reason for visit? Other/Annual   Have you experienced any of the following:   Change in activity? Yes   Unexpected weight change? No   Neck pain? No   Hearing loss? No   Runny nose? No   Trouble swallowing? No   Eye discharge? No   Changes in vision? Yes   Chest tightness? No   Wheezing? No   Chest pain? No   Heart beating fast or racing? No   Blood in stool? No   Constipation? No   Vomiting? No   Diarrhea? No   Drinking much more than usual? No   Urinating much more than usual? No   Difficulty urinating? No   Blood in the urine? No   Menstrual problem? No   Painful urination? No   Joint swelling? Yes   Joint pain? Yes   Headaches? No   Weakness? Yes   Confusion? No   Feeling depressed? Yes     [facility] Documents    2/28/2023  8:33 PM CST - Filed by Patient   Would you like a copy of [facility]'s Financial Assistance Policy Summary? No, I would not like a copy.   Is this visit work-related or due to a work-related accident/injury? No   Would you like to receive more information regarding your rights and protections under the [place] Billing act? No, I would not.     [facility]    2/28/2023  8:36 PM CST -  Filed by Patient   On average, how many days per week do you engage in moderate to strenuous exercise (like a brisk walk)? 1 day   On average, how many minutes do you engage in exercise at this level? 30 min   Do you feel stress - tense, restless, nervous, or anxious, or unable to sleep at night because your mind is troubled all the time - these days? Very much   Do you belong to any clubs or organizations such as Islam groups, unions, fraternal or athletic groups, or school groups? No   How often do you attend meetings of the clubs or organizations you belong to? Patient refused   In a typical week, how many times do you talk on the phone with family, friends, or neighbors? More than three times a week   How often do you get together with friends or relatives? Once a week   Are you , , , , never , or living with a partner?    How hard is it for you to pay for the very basics like food, housing, medical care, and heating? Not hard at all   Within the past 12 months, you worried that your food would run out before you got the money to buy more. Never true   Within the past 12 months, the food you bought just didnt last and you didnt have money to get more. Never true   In the past 12 months, has lack of transportation kept you from medical appointments or from getting medications? No   In the past 12 months, has lack of transportation kept you from meetings, work, or from getting things needed for daily living? No   How often do you have a drink containing alcohol? 2-4 times a month   How many drinks containing alcohol do you have on a typical day when you are drinking? 1 or 2   How often do you have six or more drinks on one occasion? Never   In the last 12 months, was there a time when you were not able to pay the mortgage or rent on time? No   In the last 12 months, how many places have you lived? (range: at least 0) 1   In the last 12 months, was there a time when  "you did not have a steady place to sleep or slept in a shelter (including now)? No         Pmh, Psh, Family Hx, Social Hx, HM updated in Epic Tabs today.   Review of Systems   Constitutional:  Positive for activity change. Negative for unexpected weight change.   HENT:  Negative for hearing loss, rhinorrhea and trouble swallowing.    Eyes:  Positive for visual disturbance. Negative for discharge.   Respiratory:  Negative for chest tightness and wheezing.    Cardiovascular:  Negative for chest pain and palpitations.   Gastrointestinal:  Negative for blood in stool, constipation, diarrhea and vomiting.   Endocrine: Negative for polydipsia and polyuria.   Genitourinary:  Negative for difficulty urinating, dysuria, hematuria and menstrual problem.   Musculoskeletal:  Positive for arthralgias and joint swelling. Negative for neck pain.   Neurological:  Positive for weakness. Negative for headaches.   Psychiatric/Behavioral:  Positive for dysphoric mood. Negative for confusion.    Objective:     Vitals:    03/01/23 1413   BP: 124/86   Pulse: 83   Temp: 98.6 °F (37 °C)   TempSrc: Oral   Weight: 87.8 kg (193 lb 7.3 oz)   Height: 5' 1" (1.549 m)     Wt Readings from Last 10 Encounters:   03/01/23 87.8 kg (193 lb 7.3 oz)   02/28/23 86.1 kg (189 lb 13.1 oz)   08/15/22 83.9 kg (185 lb)   03/17/22 85.5 kg (188 lb 7.9 oz)   02/16/22 83.6 kg (184 lb 4.9 oz)   12/28/21 85.4 kg (188 lb 4.4 oz)   04/12/21 75.8 kg (167 lb)   01/18/21 77.1 kg (170 lb)   01/11/21 77.3 kg (170 lb 6.4 oz)   12/11/20 78.9 kg (174 lb)     Physical Exam  Vitals reviewed.   Constitutional:       Appearance: Normal appearance. She is well-developed. She is morbidly obese.   HENT:      Head: Normocephalic and atraumatic.      Right Ear: Tympanic membrane and external ear normal.      Left Ear: Tympanic membrane and external ear normal.      Nose: Nose normal.      Mouth/Throat:      Mouth: Mucous membranes are moist.      Pharynx: Oropharynx is clear.   Eyes: "      Conjunctiva/sclera: Conjunctivae normal.      Pupils: Pupils are equal, round, and reactive to light.   Neck:      Thyroid: No thyromegaly.   Cardiovascular:      Rate and Rhythm: Normal rate and regular rhythm.      Heart sounds: Normal heart sounds. No murmur heard.    No friction rub. No gallop.   Pulmonary:      Effort: Pulmonary effort is normal. No respiratory distress.      Breath sounds: Normal breath sounds. No wheezing or rales.   Abdominal:      General: Bowel sounds are normal. There is no distension.      Palpations: Abdomen is soft.      Tenderness: There is no abdominal tenderness. There is no rebound.   Musculoskeletal:         General: Normal range of motion.      Cervical back: Normal range of motion and neck supple.   Lymphadenopathy:      Cervical: No cervical adenopathy.   Skin:     General: Skin is warm and dry.      Findings: No rash.   Neurological:      Mental Status: She is alert and oriented to person, place, and time.   Psychiatric:         Attention and Perception: Attention and perception normal.         Mood and Affect: Mood and affect normal.         Speech: Speech normal.         Behavior: Behavior normal.         Thought Content: Thought content normal.         Cognition and Memory: Cognition and memory normal.         Judgment: Judgment normal.     Assessment:     1. Routine general medical examination at a health care facility    2. Class 1 obesity with body mass index (BMI) of 32.0 to 32.9 in adult, unspecified obesity type, unspecified whether serious comorbidity present    3. Hot flashes due to menopause    4. Elevated BP without diagnosis of hypertension    5. Mixed anxiety depressive disorder        LABS:   Lab Results   Component Value Date    HGBA1C 5.3 02/10/2022    HGBA1C 5.4 10/06/2020    HGBA1C 5.5 04/23/2019      Lab Results   Component Value Date    CHOL 268 (H) 02/16/2022    CHOL 216 (H) 11/19/2020    CHOL 262 (H) 10/06/2020     Lab Results   Component Value  Date    LDLCALC 175.8 (H) 02/16/2022    LDLCALC 140.2 11/19/2020    LDLCALC 172.2 (H) 10/06/2020     Lab Results   Component Value Date    WBC 3.42 (L) 02/10/2022    HGB 11.9 (L) 02/10/2022    HCT 36.9 (L) 02/10/2022     (L) 02/10/2022    CHOL 268 (H) 02/16/2022    TRIG 91 02/16/2022    HDL 74 02/16/2022    ALT 18 02/10/2022    AST 20 02/10/2022     02/10/2022    K 4.2 02/10/2022     02/10/2022    CREATININE 0.8 02/10/2022    BUN 15 02/10/2022    CO2 24 02/10/2022    TSH 2.669 02/10/2022    HGBA1C 5.3 02/10/2022       The 10-year ASCVD risk score (Kim RESENDEZ, et al., 2019) is: 2.9%    Values used to calculate the score:      Age: 58 years      Sex: Female      Is Non- : No      Diabetic: No      Tobacco smoker: No      Systolic Blood Pressure: 124 mmHg      Is BP treated: No      HDL Cholesterol: 74 mg/dL      Total Cholesterol: 268 mg/dL    Mammo Digital Screening Bilat w/ Jonny  Narrative: Result:  Mammo Digital Screening Bilat w/ Jonny    History:  Patient is 57 y.o. and is seen for a screening mammogram.    Films Compared:  Compared to: 12/10/2020 Mammo Digital Screening Bilat w/ Jonny, 11/29/2019   Mammo Digital Screening Bilat w/ Jonny, 10/04/2018 Mammo Digital Screening   Bilat With CAD, 09/06/2017 Mammo Digital Screening Bilat with CAD,   07/14/2016 Mammo Digital Screening Bilat with CAD, 07/08/2015 Mammo   Digital Screening Bilat with CAD, and 11/25/2013 Mammo Digital Screening   Bilat with CAD     Findings:   This procedure was performed using tomosynthesis.   Computer-aided detection was utilized in the interpretation of this   examination.    The breasts have scattered areas of fibroglandular density. There is no   evidence of suspicious masses, microcalcifications or architectural   distortion.  Impression:    No mammographic evidence of malignancy.    BI-RADS Category 1: Negative    Recommendation:  Routine screening mammogram in 1 year is recommended.    Your  estimated lifetime risk of breast cancer (to age 85) based on   Tyrer-Cuzick risk assessment model is 6.36 %.  According to the American   Cancer Society, patients with a lifetime breast cancer risk of 20% or   higher might benefit from supplemental screening tests. ??       Plan:   Tova was seen today for annual exam.    Diagnoses and all orders for this visit:    Routine general medical examination at a health care facility  -     TSH; Future  -     T4, Free; Future  -     Lipid Panel; Future  -     Hemoglobin A1C; Future  -     Comprehensive Metabolic Panel; Future  -     CBC Auto Differential; Future  -     Insulin, Random; Future    Class 1 obesity with body mass index (BMI) of 32.0 to 32.9 in adult, unspecified obesity type, unspecified whether serious comorbidity present  -     Ambulatory referral/consult to [facility]; Future    Hot flashes due to menopause  -     Ambulatory referral/consult to [facility]; Future    Elevated BP without diagnosis of hypertension  -     Ambulatory referral/consult to [facility]; Future    Mixed anxiety depressive disorder  -     citalopram (CELEXA) 20 MG tablet; Take 1 tablet (20 mg total) by mouth once daily.      Referral given to Wellness clinic for weight management.   Lab work ordered to be completed tomorrow.   Rx refill Celexa 20 mg/day for MDD treatment.   Instructed to f/u in 1 year.     There are no Patient Instructions on file for this visit.    Follow up in about 1 year (around 3/1/2024) for physical with Dr RAMSEY.  Chiara Attestation:   I, Simón Stout, am scribing for, and in the presence of, Dr. Oumou Ramsey MD. I performed the above scribed service and the documentation accurately describes the services I performed. I attest to the accuracy of the note.    I, Dr. Oumou Ramsey MD, reviewed documentation as scribed above. I personally performed the services described in this documentation.  I agree that  the record reflects my personal performance and is accurate and complete. Oumou Lopez MD.    03/01/2023

## 2023-03-02 ENCOUNTER — LAB VISIT (OUTPATIENT)
Dept: LAB | Facility: HOSPITAL | Age: 58
End: 2023-03-02
Attending: FAMILY MEDICINE
Payer: OTHER GOVERNMENT

## 2023-03-02 DIAGNOSIS — Z00.00 ROUTINE GENERAL MEDICAL EXAMINATION AT A HEALTH CARE FACILITY: ICD-10-CM

## 2023-03-02 LAB
BASOPHILS # BLD AUTO: 0.01 K/UL (ref 0–0.2)
BASOPHILS NFR BLD: 0.3 % (ref 0–1.9)
DIFFERENTIAL METHOD: ABNORMAL
EOSINOPHIL # BLD AUTO: 0.1 K/UL (ref 0–0.5)
EOSINOPHIL NFR BLD: 1.7 % (ref 0–8)
ERYTHROCYTE [DISTWIDTH] IN BLOOD BY AUTOMATED COUNT: 13.2 % (ref 11.5–14.5)
HCT VFR BLD AUTO: 38.3 % (ref 37–48.5)
HGB BLD-MCNC: 12.2 G/DL (ref 12–16)
IMM GRANULOCYTES # BLD AUTO: 0 K/UL (ref 0–0.04)
IMM GRANULOCYTES NFR BLD AUTO: 0 % (ref 0–0.5)
LYMPHOCYTES # BLD AUTO: 1.2 K/UL (ref 1–4.8)
LYMPHOCYTES NFR BLD: 32.3 % (ref 18–48)
MCH RBC QN AUTO: 33 PG (ref 27–31)
MCHC RBC AUTO-ENTMCNC: 31.9 G/DL (ref 32–36)
MCV RBC AUTO: 104 FL (ref 82–98)
MONOCYTES # BLD AUTO: 0.3 K/UL (ref 0.3–1)
MONOCYTES NFR BLD: 8 % (ref 4–15)
NEUTROPHILS # BLD AUTO: 2.1 K/UL (ref 1.8–7.7)
NEUTROPHILS NFR BLD: 57.7 % (ref 38–73)
NRBC BLD-RTO: 0 /100 WBC
PLATELET # BLD AUTO: 166 K/UL (ref 150–450)
PMV BLD AUTO: 10.3 FL (ref 9.2–12.9)
RBC # BLD AUTO: 3.7 M/UL (ref 4–5.4)
WBC # BLD AUTO: 3.62 K/UL (ref 3.9–12.7)

## 2023-03-02 PROCEDURE — 84439 ASSAY OF FREE THYROXINE: CPT | Performed by: FAMILY MEDICINE

## 2023-03-02 PROCEDURE — 36415 COLL VENOUS BLD VENIPUNCTURE: CPT | Mod: PN | Performed by: FAMILY MEDICINE

## 2023-03-02 PROCEDURE — 80053 COMPREHEN METABOLIC PANEL: CPT | Performed by: FAMILY MEDICINE

## 2023-03-02 PROCEDURE — 83525 ASSAY OF INSULIN: CPT | Performed by: FAMILY MEDICINE

## 2023-03-02 PROCEDURE — 85025 COMPLETE CBC W/AUTO DIFF WBC: CPT | Performed by: FAMILY MEDICINE

## 2023-03-02 PROCEDURE — 83036 HEMOGLOBIN GLYCOSYLATED A1C: CPT | Performed by: FAMILY MEDICINE

## 2023-03-02 PROCEDURE — 84443 ASSAY THYROID STIM HORMONE: CPT | Performed by: FAMILY MEDICINE

## 2023-03-02 PROCEDURE — 80061 LIPID PANEL: CPT | Performed by: FAMILY MEDICINE

## 2023-03-03 LAB
ALBUMIN SERPL BCP-MCNC: 4.2 G/DL (ref 3.5–5.2)
ALP SERPL-CCNC: 73 U/L (ref 55–135)
ALT SERPL W/O P-5'-P-CCNC: 26 U/L (ref 10–44)
ANION GAP SERPL CALC-SCNC: 5 MMOL/L (ref 8–16)
AST SERPL-CCNC: 24 U/L (ref 10–40)
BILIRUB SERPL-MCNC: 0.4 MG/DL (ref 0.1–1)
BUN SERPL-MCNC: 22 MG/DL (ref 6–20)
CALCIUM SERPL-MCNC: 9.3 MG/DL (ref 8.7–10.5)
CHLORIDE SERPL-SCNC: 107 MMOL/L (ref 95–110)
CHOLEST SERPL-MCNC: 275 MG/DL (ref 120–199)
CHOLEST/HDLC SERPL: 4 {RATIO} (ref 2–5)
CO2 SERPL-SCNC: 27 MMOL/L (ref 23–29)
CREAT SERPL-MCNC: 0.9 MG/DL (ref 0.5–1.4)
EST. GFR  (NO RACE VARIABLE): >60 ML/MIN/1.73 M^2
ESTIMATED AVG GLUCOSE: 111 MG/DL (ref 68–131)
GLUCOSE SERPL-MCNC: 86 MG/DL (ref 70–110)
HBA1C MFR BLD: 5.5 % (ref 4–5.6)
HDLC SERPL-MCNC: 68 MG/DL (ref 40–75)
HDLC SERPL: 24.7 % (ref 20–50)
INSULIN COLLECTION INTERVAL: NORMAL
INSULIN SERPL-ACNC: 7.5 UU/ML
LDLC SERPL CALC-MCNC: 188 MG/DL (ref 63–159)
NONHDLC SERPL-MCNC: 207 MG/DL
POTASSIUM SERPL-SCNC: 4.5 MMOL/L (ref 3.5–5.1)
PROT SERPL-MCNC: 7.5 G/DL (ref 6–8.4)
SODIUM SERPL-SCNC: 139 MMOL/L (ref 136–145)
T4 FREE SERPL-MCNC: 0.87 NG/DL (ref 0.71–1.51)
TRIGL SERPL-MCNC: 95 MG/DL (ref 30–150)
TSH SERPL DL<=0.005 MIU/L-ACNC: 2.68 UIU/ML (ref 0.4–4)

## 2023-03-05 NOTE — PROGRESS NOTES
Tova Muñoz    Your labs have been reviewed. Your sugar levels, blood counts, kidney, liver functions, and thyroid functions are within goal ranges.       Your cholesterol profile has been reviewed. Based on current guidelines it is recommended that you work hard on a lower fat diet. Your risk has been calculated for you having an Atherosclerotic Cardiovascular Disease (ASCVD) event over the next 10 years. An event is defined as myocardial infarction, CHD death, or stroke. The ASCVD risk score (Svetlana VERAS Jr, et al., 2013) returns the percentage likelihood of a first time ASCVD event. Here are your numbers/criteria:                                 The 10-year ASCVD risk score (Kim RESENDEZ, et al., 2019) is: 3.1%    Values used to calculate the score:      Age: 58 years      Sex: Female      Is Non- : No      Diabetic: No      Tobacco smoker: No      Systolic Blood Pressure: 124 mmHg      Is BP treated: No      HDL Cholesterol: 68 mg/dL      Total Cholesterol: 275 mg/dL         You should modify your diet to get your cholesterol numbers down,  Any calculation >7% is recommended to go on statin medication (cholesterol lowering) therapy. Your number is < 7% so no prescription medications are recommended at this time. Working on a better lower fat diet and weight loss will be beneficial. Please let me know if you have further questions.  Oumou Lopez MD

## 2023-03-29 DIAGNOSIS — Z12.31 OTHER SCREENING MAMMOGRAM: ICD-10-CM

## 2023-04-10 ENCOUNTER — OFFICE VISIT (OUTPATIENT)
Dept: OBSTETRICS AND GYNECOLOGY | Facility: CLINIC | Age: 58
End: 2023-04-10
Payer: OTHER GOVERNMENT

## 2023-04-10 VITALS — WEIGHT: 190.69 LBS | BODY MASS INDEX: 36 KG/M2 | HEIGHT: 61 IN

## 2023-04-10 DIAGNOSIS — N94.10 DYSPAREUNIA IN FEMALE: ICD-10-CM

## 2023-04-10 DIAGNOSIS — Z12.4 ENCOUNTER FOR SCREENING FOR CERVICAL CANCER: ICD-10-CM

## 2023-04-10 DIAGNOSIS — N95.2 VAGINAL ATROPHY: ICD-10-CM

## 2023-04-10 DIAGNOSIS — Z01.419 WELL WOMAN EXAM WITH ROUTINE GYNECOLOGICAL EXAM: Primary | ICD-10-CM

## 2023-04-10 PROCEDURE — 99999 PR PBB SHADOW E&M-EST. PATIENT-LVL III: ICD-10-PCS | Mod: PBBFAC,,, | Performed by: NURSE PRACTITIONER

## 2023-04-10 PROCEDURE — 99396 PREV VISIT EST AGE 40-64: CPT | Mod: S$PBB,,, | Performed by: NURSE PRACTITIONER

## 2023-04-10 PROCEDURE — 87624 HPV HI-RISK TYP POOLED RSLT: CPT | Performed by: NURSE PRACTITIONER

## 2023-04-10 PROCEDURE — 99396 PR PREVENTIVE VISIT,EST,40-64: ICD-10-PCS | Mod: S$PBB,,, | Performed by: NURSE PRACTITIONER

## 2023-04-10 PROCEDURE — 88175 CYTOPATH C/V AUTO FLUID REDO: CPT | Performed by: NURSE PRACTITIONER

## 2023-04-10 PROCEDURE — 99213 OFFICE O/P EST LOW 20 MIN: CPT | Mod: PBBFAC | Performed by: NURSE PRACTITIONER

## 2023-04-10 PROCEDURE — 81514 NFCT DS BV&VAGINITIS DNA ALG: CPT | Performed by: NURSE PRACTITIONER

## 2023-04-10 PROCEDURE — 99999 PR PBB SHADOW E&M-EST. PATIENT-LVL III: CPT | Mod: PBBFAC,,, | Performed by: NURSE PRACTITIONER

## 2023-04-10 RX ORDER — ESTRADIOL 0.1 MG/G
CREAM VAGINAL
Qty: 42.5 G | Refills: 11 | Status: SHIPPED | OUTPATIENT
Start: 2023-04-10 | End: 2023-08-17

## 2023-04-10 NOTE — PROGRESS NOTES
Subjective:       Patient ID: Tova Muñoz is a 58 y.o. female.    Chief Complaint:  Annual Exam    Patient's last menstrual period was 2018.  History of Present Illness  Annual Exam-Postmenopausal  Patient presents for annual exam.  The patient is sexually active. GYN screening history: last pap: approximate date 12/10/2020 and was normal and last mammogram: approximate date 3/10/22 and was normal. Annual mmg scheduled. The patient is not taking hormone replacement therapy. Patient denies post-menopausal vaginal bleeding. The patient wears seatbelts: yes. The patient participates in regular exercise: no. Has the patient ever been transfused or tattooed?: yes. The patient reports that there is not domestic violence in her life.  Patient has complaints of pain with intercourse.  Patient has noticed increase in vaginal odor over the last couple of months.  Denies any significant change in vaginal discharge.  Denies vaginal itching.    OB History    Para Term  AB Living   4 4 2     2   SAB IAB Ectopic Multiple Live Births           2      # Outcome Date GA Lbr Uriel/2nd Weight Sex Delivery Anes PTL Lv   4 Para      CS-Unspec   TOI   3 Para      CS-Unspec   TOI   2 Term            1 Term                Review of Systems  Review of Systems   Constitutional:  Negative for appetite change, fatigue, fever and unexpected weight change.   Eyes:  Negative for visual disturbance.   Cardiovascular:  Negative for chest pain.   Gastrointestinal:  Negative for abdominal pain, bloating, constipation, diarrhea, nausea and vomiting.   Genitourinary:  Positive for dyspareunia and vaginal odor. Negative for bladder incontinence, dysmenorrhea, dysuria, flank pain, frequency, genital sores, menorrhagia, menstrual problem, pelvic pain, urgency, vaginal bleeding, vaginal discharge, vaginal pain, postcoital bleeding and vaginal dryness.   Integumentary:  Negative for rash, acne, mole/lesion, breast mass, nipple  discharge, breast skin changes and breast tenderness.   Neurological:  Negative for syncope and headaches.   Hematological:  Negative for adenopathy. Does not bruise/bleed easily.   All other systems reviewed and are negative.  Breast: Positive for breast self exam.Negative for asymmetry, lump, mass, nipple discharge, skin changes and tenderness         Objective:      Physical Exam:   Constitutional: She is oriented to person, place, and time. She appears well-developed and well-nourished.    HENT:   Head: Normocephalic and atraumatic.    Eyes: Pupils are equal, round, and reactive to light. Conjunctivae and EOM are normal.     Cardiovascular:  Normal rate and regular rhythm.             Pulmonary/Chest: Effort normal. Right breast exhibits no inverted nipple, no mass, no nipple discharge, no skin change, no tenderness, no bleeding and no swelling. Left breast exhibits no inverted nipple, no mass, no nipple discharge, no skin change, no tenderness, no bleeding and no swelling. Breasts are symmetrical.        Abdominal: Soft. Hernia confirmed negative in the right inguinal area and confirmed negative in the left inguinal area.     Genitourinary:    Inguinal canal, uterus, right adnexa, left adnexa and rectum normal.      Pelvic exam was performed with patient supine.   The external female genitalia was normal.   Genitalia hair distrobution normal .   Labial bartholins normal.There is no rash, tenderness, lesion or injury on the right labia. There is no rash, tenderness, lesion or injury on the left labia. Cervix is normal. Right adnexum displays no mass, no tenderness and no fullness. Left adnexum displays no mass, no tenderness and no fullness. There is vaginal discharge (White, thin) in the vagina. No erythema, bleeding, rectocele, cystocele or unspecified prolapse of vaginal walls in the vagina. Vaginal atrophy noted. Cervix exhibits no motion tenderness and no friability.    pap smear completedUterus is not  tender. Normal urethral meatus.          Musculoskeletal: Normal range of motion and moves all extremeties.      Lymphadenopathy: No inguinal adenopathy noted on the right or left side.    Neurological: She is alert and oriented to person, place, and time.    Skin: Skin is warm and dry. No rash noted. No erythema.    Psychiatric: She has a normal mood and affect. Her behavior is normal. Judgment and thought content normal.          Assessment:     1. Well woman exam with routine gynecological exam    2. Encounter for screening for cervical cancer    3. Dyspareunia in female    4. Vaginal atrophy              Plan:   Tova was seen today for annual exam.    Diagnoses and all orders for this visit:    Well woman exam with routine gynecological exam  -     Liquid-Based Pap Smear, Screening  -     HPV High Risk Genotypes, PCR    Encounter for screening for cervical cancer  -     Liquid-Based Pap Smear, Screening  -     HPV High Risk Genotypes, PCR    Dyspareunia in female  -     Vaginosis Screen by DNA Probe  -     estradioL (ESTRACE) 0.01 % (0.1 mg/gram) vaginal cream; Place 1/4 applicator intravaginally, nightly for 1-2 weeks then decrease to 3-4 times per week.    Vaginal atrophy  -     estradioL (ESTRACE) 0.01 % (0.1 mg/gram) vaginal cream; Place 1/4 applicator intravaginally, nightly for 1-2 weeks then decrease to 3-4 times per week.      Will follow vaginosis screen and treat as indicated.  Recommend beginning vaginal estrogen cream for vaginal atrophy.  Medication details, dosing, risks, side-effects, and interactions were discussed.    Oil based lubrication with intercourse.    Follow up with me in 1 year for annual well woman exam.

## 2023-04-12 LAB
BACTERIAL VAGINOSIS DNA: NEGATIVE
CANDIDA GLABRATA DNA: NEGATIVE
CANDIDA KRUSEI DNA: NEGATIVE
CANDIDA RRNA VAG QL PROBE: NEGATIVE
T VAGINALIS RRNA GENITAL QL PROBE: NEGATIVE

## 2023-04-14 ENCOUNTER — HOSPITAL ENCOUNTER (OUTPATIENT)
Dept: RADIOLOGY | Facility: HOSPITAL | Age: 58
Discharge: HOME | End: 2023-04-14
Attending: FAMILY MEDICINE
Payer: OTHER GOVERNMENT

## 2023-04-14 DIAGNOSIS — Z12.31 OTHER SCREENING MAMMOGRAM: ICD-10-CM

## 2023-04-14 PROCEDURE — 77067 SCR MAMMO BI INCL CAD: CPT | Mod: TC,PO

## 2023-04-14 PROCEDURE — 77063 BREAST TOMOSYNTHESIS BI: CPT | Mod: 26,,, | Performed by: RADIOLOGY

## 2023-04-14 PROCEDURE — 77067 MAMMO DIGITAL SCREENING BILAT WITH TOMO: ICD-10-PCS | Mod: 26,,, | Performed by: RADIOLOGY

## 2023-04-14 PROCEDURE — 77063 MAMMO DIGITAL SCREENING BILAT WITH TOMO: ICD-10-PCS | Mod: 26,,, | Performed by: RADIOLOGY

## 2023-04-14 PROCEDURE — 77067 SCR MAMMO BI INCL CAD: CPT | Mod: 26,,, | Performed by: RADIOLOGY

## 2023-04-18 ENCOUNTER — PATIENT MESSAGE (OUTPATIENT)
Dept: PRIMARY CARE CLINIC | Facility: CLINIC | Age: 58
End: 2023-04-18
Payer: OTHER GOVERNMENT

## 2023-04-18 DIAGNOSIS — E66.9 CLASS 1 OBESITY WITH BODY MASS INDEX (BMI) OF 32.0 TO 32.9 IN ADULT, UNSPECIFIED OBESITY TYPE, UNSPECIFIED WHETHER SERIOUS COMORBIDITY PRESENT: Primary | ICD-10-CM

## 2023-04-18 DIAGNOSIS — N95.1 HOT FLASHES DUE TO MENOPAUSE: ICD-10-CM

## 2023-04-18 DIAGNOSIS — R03.0 ELEVATED BP WITHOUT DIAGNOSIS OF HYPERTENSION: ICD-10-CM

## 2023-04-19 ENCOUNTER — PATIENT MESSAGE (OUTPATIENT)
Dept: PRIMARY CARE CLINIC | Facility: CLINIC | Age: 58
End: 2023-04-19
Payer: OTHER GOVERNMENT

## 2023-04-19 LAB
FINAL PATHOLOGIC DIAGNOSIS: NORMAL
Lab: NORMAL

## 2023-04-19 RX ORDER — METRONIDAZOLE 500 MG/1
500 TABLET ORAL 2 TIMES DAILY
Qty: 14 TABLET | Refills: 0 | Status: SHIPPED | OUTPATIENT
Start: 2023-04-19 | End: 2023-04-26

## 2023-05-25 ENCOUNTER — OFFICE VISIT (OUTPATIENT)
Dept: PRIMARY CARE CLINIC | Facility: CLINIC | Age: 58
End: 2023-05-25
Payer: OTHER GOVERNMENT

## 2023-05-25 ENCOUNTER — PATIENT MESSAGE (OUTPATIENT)
Dept: PRIMARY CARE CLINIC | Facility: CLINIC | Age: 58
End: 2023-05-25

## 2023-05-25 VITALS
WEIGHT: 191.38 LBS | SYSTOLIC BLOOD PRESSURE: 124 MMHG | HEIGHT: 61 IN | DIASTOLIC BLOOD PRESSURE: 84 MMHG | OXYGEN SATURATION: 97 % | TEMPERATURE: 98 F | BODY MASS INDEX: 36.13 KG/M2 | HEART RATE: 86 BPM

## 2023-05-25 DIAGNOSIS — E66.01 CLASS 2 SEVERE OBESITY DUE TO EXCESS CALORIES WITH SERIOUS COMORBIDITY AND BODY MASS INDEX (BMI) OF 36.0 TO 36.9 IN ADULT: ICD-10-CM

## 2023-05-25 DIAGNOSIS — F41.1 GAD (GENERALIZED ANXIETY DISORDER): ICD-10-CM

## 2023-05-25 DIAGNOSIS — R03.0 ELEVATED BP WITHOUT DIAGNOSIS OF HYPERTENSION: ICD-10-CM

## 2023-05-25 DIAGNOSIS — Z83.3 FAMILY HISTORY OF DIABETES MELLITUS (DM): Primary | ICD-10-CM

## 2023-05-25 PROCEDURE — 99215 OFFICE O/P EST HI 40 MIN: CPT | Mod: S$PBB,,, | Performed by: FAMILY MEDICINE

## 2023-05-25 PROCEDURE — 99215 PR OFFICE/OUTPT VISIT, EST, LEVL V, 40-54 MIN: ICD-10-PCS | Mod: S$PBB,,, | Performed by: FAMILY MEDICINE

## 2023-05-25 PROCEDURE — 99999 PR PBB SHADOW E&M-EST. PATIENT-LVL V: ICD-10-PCS | Mod: PBBFAC,,, | Performed by: FAMILY MEDICINE

## 2023-05-25 PROCEDURE — 99215 OFFICE O/P EST HI 40 MIN: CPT | Mod: PBBFAC | Performed by: FAMILY MEDICINE

## 2023-05-25 PROCEDURE — 99999 PR PBB SHADOW E&M-EST. PATIENT-LVL V: CPT | Mod: PBBFAC,,, | Performed by: FAMILY MEDICINE

## 2023-05-25 RX ORDER — PHENTERMINE AND TOPIRAMATE 3.75; 23 MG/1; MG/1
1 CAPSULE, EXTENDED RELEASE ORAL DAILY
Qty: 14 CAPSULE | Refills: 0 | Status: SHIPPED | OUTPATIENT
Start: 2023-05-25 | End: 2023-06-08

## 2023-05-25 RX ORDER — PHENTERMINE AND TOPIRAMATE 7.5; 46 MG/1; MG/1
1 CAPSULE, EXTENDED RELEASE ORAL DAILY
Qty: 30 CAPSULE | Refills: 0 | Status: SHIPPED | OUTPATIENT
Start: 2023-05-25 | End: 2023-06-24

## 2023-05-25 NOTE — PROGRESS NOTES
"Subjective     Patient ID: Tova Muñoz is a 58 y.o. female.    Pmhx, fam hx, soc hx, surg hx, allergies, med list reviewed    Referring MD: Jessica  PCP: same  BMI noted 36  Diet: variable  Exercise/Activity: limited due to b/l knee pain and Left heel pain, hips/back pain  NO pain with light resistance training  Sleep: fair  Stressors: increased work stress  Anxiety/Depression Screen/PHQ-2:       Labs reviewed:  A1c and insulin level ok; lipids: elevated totatl chol, trig ok, hdl>50  TSH ok      Chief Complaint: weight gain    Pt reports significant issues losing weight. Tried optivia and had weight gain after 20 lb. Had complete regain despite not changing what was doing. Tried contrave in the past and got to 1 po bid and failed.     She used to enjoy jogging 3 miles/day every other day and 6 miles on Sat. Had knee injury so not able to run.     Food recall:  Bfast: yogurt, bagel (tries to limit carbs)  Lunch: largest meal of the day: sandwich (eats at desk) subway or [employer]  Sometimes baked chips--has cut those out  Dinner: not as hungry: cottage cheese, bowl of cereal   enjoys cooking  Water: suboptimal  Snack: almonds  Sugar Sweetened beverages: half and half tea, likes unsweet,   Vanilla latte 2x/week, coffee otherwise is black    Does feel tired in mid afternoon: gets cranky, needs a snack, has some optiva bars or almonds. Usually no constipation.    Pt is "picky" eater. Likes some green veggies: likes squash, zucchini, green peppers, red peppers. Likes sugar snap peas and green beans in air fryer.     Hx of topamax use for headaches.     HPI  Review of Systems   Constitutional:  Negative for activity change, appetite change, fatigue and fever.   HENT:  Negative for mouth dryness and goiter.    Eyes:  Negative for visual disturbance.   Respiratory:  Negative for apnea, cough, chest tightness and shortness of breath.    Cardiovascular:  Negative for chest pain, palpitations and leg swelling. " "  Gastrointestinal:  Negative for abdominal pain, constipation and diarrhea.   Endocrine: Negative for cold intolerance, heat intolerance, polydipsia, polyphagia and polyuria.   Genitourinary:  Negative for frequency and menstrual problem.   Musculoskeletal:  Negative for arthralgias and myalgias.   Integumentary:  Negative for color change and rash.   Psychiatric/Behavioral:  Negative for sleep disturbance. The patient is not nervous/anxious.         Objective   41" waist  Physical Exam  Vitals and nursing note reviewed.   Constitutional:       General: She is not in acute distress.  HENT:      Head: Normocephalic and atraumatic.   Eyes:      General: No scleral icterus.     Pupils: Pupils are equal, round, and reactive to light.   Neck:      Comments: No TM  Cardiovascular:      Rate and Rhythm: Normal rate and regular rhythm.      Pulses: Normal pulses.      Heart sounds: Normal heart sounds. No murmur heard.    No friction rub. No gallop.   Pulmonary:      Effort: Pulmonary effort is normal. No respiratory distress.      Breath sounds: Normal breath sounds. No wheezing, rhonchi or rales.   Abdominal:      General: Bowel sounds are normal. There is no distension.      Palpations: Abdomen is soft.      Tenderness: There is no abdominal tenderness.   Musculoskeletal:         General: No swelling.      Cervical back: Normal range of motion and neck supple. No tenderness.   Lymphadenopathy:      Cervical: No cervical adenopathy.   Skin:     General: Skin is warm.      Findings: No erythema or rash.   Neurological:      Mental Status: She is alert and oriented to person, place, and time.   Psychiatric:         Mood and Affect: Mood normal.         Behavior: Behavior normal.          Assessment and Plan     1. Family history of diabetes mellitus (DM)    2. NIKOLAS (generalized anxiety disorder)    3. Elevated BP without diagnosis of hypertension    4. Class 2 severe obesity due to excess calories with serious comorbidity " and body mass index (BMI) of 36.0 to 36.9 in adult        Return to clinic 4-6/sooner if needed  Send [employer] shuttle messages next week  Extensive dw pt about risks/benefits/se's all AOMs    Family history of diabetes mellitus (DM)  -     Ambulatory Referral/Consult to Nutrition Services - [facility]; Future; Expected date: 06/01/2023  -     phentermine-topiramate (QSYMIA) 3.75-23 mg CM24; Take 1 capsule by mouth Daily. for 14 days  Dispense: 14 capsule; Refill: 0  -     phentermine-topiramate (QSYMIA) 7.5-46 mg CM24; Take 1 capsule by mouth once daily.  Dispense: 30 capsule; Refill: 0    NIKOLAS (generalized anxiety disorder)  Chronic/stable on Celexa  Pt is aware should monitor for any worsening with addition of qsymia    Elevated BP without diagnosis of hypertension  -     Ambulatory Referral/Consult to Nutrition Services - [facility]; Future; Expected date: 06/01/2023    Class 2 severe obesity due to excess calories with serious comorbidity and body mass index (BMI) of 36.0 to 36.9 in adult  -     Ambulatory Referral/Consult to Nutrition Services - [facility]; Future; Expected date: 06/01/2023  -     phentermine-topiramate (QSYMIA) 3.75-23 mg CM24; Take 1 capsule by mouth Daily. for 14 days  Dispense: 14 capsule; Refill: 0  -     phentermine-topiramate (QSYMIA) 7.5-46 mg CM24; Take 1 capsule by mouth once daily.  Dispense: 30 capsule; Refill: 0    Extensive dw pt risks/benefits/common se's with qysmia  She is post menopausal; s/p BTL  Have counseled/educated pt on potential taste changes, fatigue, any MH changes, palpitations, potential increase in HR or BP, anxiety, depression, paresthesias. Expressed understanding and pt handout on AVS.      Goal 2 bottles/day water  Stretching 2x/week      In body--scan and added to chart; reviewed with pt    45+min spent with pt     reviewed

## 2023-05-25 NOTE — PATIENT INSTRUCTIONS
management in:    Adults with an initial body mass index (BMI) of:  30 kg/m2 or greater (obese) or  27 kg/m2 or greater (overweight) in the presence of at least one weight-related medical condition such as high blood pressure, type 2 diabetes, or high cholesterol  Pediatric patients aged 12 years and older with an initial BMI in the 95th percentile or greater standardized for age and sex  LIMITATIONS OF USE:    It is not known if Qsymia changes your risk of heart problems or stroke or of death due to heart problems or stroke  It is not known if Qsymia is safe and effective when taken with other prescription, over-the-counter, or herbal weight loss products  Important Safety Information    Do not take Qsymia if you are pregnant, planning to become pregnant, or become pregnant during Qsymia treatment; have glaucoma; have thyroid problems (hyperthyroidism); are taking certain medicines called monoamine oxidase inhibitors (MAOIs) or have taken MAOIs in the past 14 days; are allergic to topiramate, sympathomimetic amines such as phentermine, or any of the ingredients in Qsymia. See the end of the Medication Guide for a complete list of ingredients in Qsymia.    QSYMIA CAN CAUSE SERIOUS SIDE EFFECTS, INCLUDING:    Birth defects (cleft lip/cleft palate). If you take Qsymia during pregnancy, your baby has a higher risk for birth defects called cleft lip and cleft palate. These defects can begin early in pregnancy, even before you know you are pregnant. Patients who are pregnant must not take Qsymia. Patients who can become pregnant should have a pregnancy test before taking Qsymia and every month while taking Qsymia and use effective birth control (contraception) consistently while taking Qsymia. Talk to your healthcare provider about how to prevent pregnancy.    If you become pregnant while taking Qsymia, stop taking Qsymia immediately, and tell your healthcare provider right away. Healthcare providers and patients should  report all cases of pregnancy to FDA MedWatch at 9-435-AKY-6911, and the Qsymia Pregnancy Surveillance Program at 1-103.601.3431.    Qsymia may slow the increase in height in children 12 years and older.    Increases in heart rate. Qsymia can increase your heart rate at rest. Your healthcare provider should check your heart rate while you take Qsymia. Tell your healthcare provider if you experience, while at rest, a racing or pounding feeling in your chest lasting several minutes when taking Qsymia.    Visual field defects (independent of elevated intraocular pressure) have been reported in clinical trials and in postmarketing experience in patients receiving topiramate. In clinical trials, most of these events were reversible after topiramate discontinuation. If visual problems occur at any time during treatment, consider discontinuing Qsymia.    Suicidal thoughts or actions. Topiramate, an ingredient in Qsymia, may cause you to have suicidal thoughts or actions. Call your healthcare provider right away if you have any of these symptoms, especially if they are new, worse, or worry you: thoughts about suicide or dying; attempts to commit suicide; new or worse depression; new or worse anxiety; feeling agitated or restless; panic attacks; trouble sleeping (insomnia); new or worse irritability; acting aggressive, being angry, or violent; acting on dangerous impulses; an extreme increase in activity or talking (rozina); other unusual changes in behavior or mood.    Serious eye problems, which include any sudden decrease in vision, with or without eye pain and redness or a blockage of fluid in the eye causing increased pressure in the eye (secondary angle closure glaucoma). These problems can lead to permanent vision loss if not treated. Tell your healthcare provider right away if you have any new eye symptoms.    Qsymia may cause a severe rash with blisters and peeling skin, especially around the mouth, nose, eyes, and  genitals (Robles-Prashant Syndrome). Qsymia may also cause a rash with blisters and peeling skin over much of the body that may be life threatening (Toxic Epidermal Necrolysis). Call your healthcare provider right away if you develop a skin rash or blisters.    COMMON SIDE EFFECTS OF QSYMIA IN ADULTS INCLUDE:    Numbness or tingling in the hands, arms, feet, or face (paraesthesia); dizziness; changes in the way foods taste or loss of taste (dysgeusia); trouble sleeping (insomnia); constipation; and dry mouth.    COMMON SIDE EFFECTS OF QSYMIA IN CHILDREN 12 YEARS OLD AND OLDER INCLUDE:    Depression, dizziness, joint pain, fever, flu, and ankle sprain.    POSSIBLE SIDE EFFECTS OF QSYMIA INCLUDE:    Mood changes and trouble sleeping. Qsymia may cause depression or mood problems, and trouble sleeping. Tell your healthcare provider if symptoms occur.    Concentration, memory, and speech difficulties. Qsymia may affect how you think and cause confusion, problems with concentration, attention, memory or speech. Tell your healthcare provider if symptoms occur.    Increases of acid in bloodstream (metabolic acidosis). If left untreated, metabolic acidosis can cause brittle or soft bones (osteoporosis, osteomalacia, osteopenia), kidney stones, can slow the rate of growth in children, and may possibly harm your baby if you are pregnant. Metabolic acidosis can happen with or without symptoms. Sometimes people with metabolic acidosis will: feel tired, not feel hungry (loss of appetite), feel changes in heartbeat, or have trouble thinking clearly. Your healthcare provider should do a blood test to measure the level of acid in your blood before and during your treatment with Qsymia.    Low blood sugar (hypoglycemia) in people with type 2 diabetes mellitus who also take medicines used to treat type 2 diabetes mellitus. Weight loss can cause low blood sugar in people with type 2 diabetes mellitus who also take medicines used to  treat type 2 diabetes mellitus (such as insulin or sulfonylureas). You should check your blood sugar before you start taking Qsymia and while you take Qsymia.    High blood pressure medicines. If you are taking medicines for your blood pressure, your doctor may need to adjust these medicines while taking Qsymia.    Central Nervous System (CNS) side effects. The use of prescription sleep aids, anxiety medicines, or drinking alcohol with Qsymia may cause an increase in CNS symptoms such as dizziness and light-headedness. Do not drink alcohol with Qsymia.    Possible seizures if you stop taking Qsymia too fast. Seizures may happen in people who may or may not have had seizures in the past if you stop Qsymia too fast. Your healthcare provider will tell you how to stop taking Qsymia slowly.    Kidney stones. Drink plenty of fluids when taking Qsymia to help decrease your chances of getting kidney stones. If you get severe side or back pain, and/or blood in your urine, call your healthcare provider.    Decreased sweating and increased body temperature (fever). People should be watched for signs of decreased sweating and fever, especially in hot temperatures. Some people may need to be hospitalized for this condition.    Qsymia capsules contain the inactive ingredient FD&C Yellow No. 5 (tartrazine) which can cause allergic-type reactions (including bronchial asthma) in certain people, especially people who also have an allergy to aspirin.    Tell your healthcare provider if you have any side effect that bothers you or does not go away. These are not all of the possible side effects of Qsymia. For more information, ask your healthcare provider or pharmacist.    Call your doctor for medical advice about side effects. You may report side effects to [facility] at 1-452.308.9393 or FDA at 7-465-RLI-7348 or www.fda.gov/medwatch.    Please read the Qsymia Medication Guide, Full Prescribing Information and Risk of Birth Defects  with Bvents Patient Brochure.    The Q and Me® Patient Support Program is based on the LEARN® Program provided under copyright license (September 15, 2010). All rights reserved.    REFERENCES:    1. Qsymia Full Prescribing Information. VICKIE Pryor: Status [employer]; 2022. 2. Data on File. [employer]. 3. Carmelina Castillo. Review of Childhood Obesity: From Epidemiology, Etiology, and Comorbidities to Clinical Assessment and Treatment. Castillo Clin Proc. 2017 Feb;92(2):251-265. 4. Eneida WALSH, Prema PIERRE, Carmelina FUNES. Treatment of Adolescent Obesity in 2020. TANIYA. 2019 Nov 5;322(17):1369-0326. 5. Centers for Disease Control (n.d.). Prevalence of Childhood Obesity in the [place]. Retrieved from https://www.cdc.gov/obesity/data/childhood.html 6. Paddy ECFredrick MR. Evolution not Revolution: Nutrition and Obesity. Nutrients. 2017 May 20;9(5):519. 7. Daria T, Jericho NAJERAG. Hunger and Satiety Mechanisms and Their Potential Exploitation in the Regulation of Food Intake. Curr Obes Rep. 2016 Mar;5(1):106-12. 8. Esperanza JCG, Pb A, Osei-Patrickas B, Matson W, Villela-Rizvi F, Tamiko Cruz N, Freeman Sarkiso BÁRBARA, Lora C, Jarrod V, Sb CK, Alaniz R, Bandar YJ, Lowe-Guevara M, Junior LA. Misalignment among adolescents living with obesity, caregivers, and healthcare professionals: ACTION Teens global survey study. Pediatr Obes. 2022 Jul 15:[id number]. 9. Luther Mireles MS. Adolescent preferences for weight terminology used by health care providers. Pediatr Obes. 2018 Sep;13(9):533-540. 10. Yue SJ, Grisel FLANAGAN, Justin SR, Diana W; SECTION ON OBESITY; OBESITY SOCIETY. Stigma Experienced by Children and Adolescents With Obesity. Pediatrics. 2017 Dec;140(6):[id number]. American Academy of Pediatrics. 11. Grisel Ferguson LM. Weight-based cybervictimization: Implications for adolescent health. Pediatr Obes. 2022 Jun;17(6):y91801. The study involved 452 [place] adolescents aged 11-17 years old with an average age 14.91 years.    ©2023 [employer]. All rights  "reserved. [id number] 02/2023    [employer] Corporate Site Privacy Policy Terms of Use Contact Us            PRODUCE  [] All fresh fruit   [] All fresh vegetables   [] All fresh herbs  [] All herb purees + pastes  [] Pre-spiralized vegetable noodles   [] Steam-In-The-Bag begetables  [] Riced cauliflower  [] Jicama sticks  [] Love Beets  all varieties  [] Wholly Guacamole  all varieties  [] Hummus  all varieties, chickpea + vegetable  [] Tofu Shirataki noodles    [] Tofu  all varieties  [] Tempeh  all varieties    PROTEIN  CHICKEN   [] Boneless, skinless breasts  [] Boneless, skinless thighs  [] Ground chicken breast, at least 93% lean  [] Chicken breast cutlet  [] Aidell's  Chicken Sausage + Chicken Meatballs    TURKEY   [] Turkey breast tenderloin   [] Ground turkey breast, at least 93% lean  [] Chris Naturals  Turkey Sausage    BEEF  [] Tenderloin  [] Sirloin  [] Top Loin  [] Flank Steak  [] Round Steak  [] Filet  [] Lean ground beef, at least 93% lean + grass-fed preferable    PORK  [] Tenderloin  [] Pork Chop  [] Center Cut  [] Chris Naturals  No-Sugar Reid    BISON  [] [place]  90 - 95% lean    SEAFOOD  [] All fresh fish + seafood; locally sourced when possible  [] Smoked salmon    HEAT + EAT ENTREES   [] Kofi's Natural Foods  Chicken, Pork, Beef  [] Michael  "All Natural" Grilled Chicken Breast + Strips, all varieties    SAUCES SPREADS + DIPS  [] Bitchin Sauce  Original, Chipotle, Cilantro [place]  [] Ramakrishna's Kitchen  Tzatziki Yogurt Dip, Babaganoush, Hummus  [] Wholly Guacamole  all varieties  [] Hummus  all varieties  [] [place] Gringo Salsa  all varieties  [] Mrs. Elodia's Salsa  all varieties  [] Stubb's All Natural BBQ Sauce  [] Primal Kitchen  Castillo, Ketchup, BBQ Sauce  [] Primal Kitchen Pasta Sauce  Roasted Garlic, Tomato Basil, no-dairy Vodka Sauce  [] Sal & Perla's  HeartSmart Pasta Sauce    DAIRY/DAIRY SUBSTITUTES/EGGS  EGGS   [] All eggs  cage-free, pasture-raise " preferable  [] Crepini  egg wraps  [] Vital Farms  Pasture-Raised Egg Bites  [] JUST Egg [vegan]     CREAMERS   [] [place]  Better Half, original + vanilla unsweetened  [] NutPods  all varieties    MILK   [] Horizon Organic  all varieties except chocolate  [] Organic Valley  all varieties except chocolate  [] Organic Valley  ultra-filtered, reduced fat milk     PLANT_BASED MILK ALTERNATIVES  [] All unsweetened almond milks  original, vanilla + chocolate  [] Ripple  unsweetened   [] Milkadamia  original +_ vanilla, unsweetened   [] Forager  original + vanilla, unsweetened   [] Silk Organic  soy milk, unsweetened  [] Oatly  unsweetened  [] [place]  regular + protein-fortified oat milk, unsweetened     CHEESES  [] Regular or reduced fat cheeses  [] BelGioso  Fresh Mozzarella Snack Packs, Parmesan Power-full Snack   [] Goat cheese  [] Fresh mozzarella  [] String cheese  all varieties  [] Suzi Cottage Cheese  [] Samantha's Cultured Cottage Cheese  [] Shea Life 'Just Like Mozzarella'  plant-based shreds and other varieties  [] Parmela Creamery  plant-based shredded cheese    YOGURT  [] Fage  2% low-fat, plain  [] Siggi's  plain, vanilla  [] Chobani Greek  nonfat + whole milk yogurt, plain   [] Chobani Less Sugar  all flavored varieties   [] Oikos Greek  nonfat, plain  [] Two Good  all varieties   [] Greek Provisions  plain  [] [place] Organic  low-fat + nonfat, plain  [] [facility]  goat milk yogurt, plain  [] Kefit  unsweetened, plain  [] Forager  Greek style unsweetened, plain [dairy-free]  [] [place]  unsweetened Greek style, plain [dairy-free]  [] [facility]  almond milk yogurt, vanilla or plain, unsweetened [dairy-free]    FREEZER SECTION  FROZEN VEGGIES  [] All plain frozen veggies + greens [e.g. broccoli, brussels, carrots, okra, mushrooms, zucchini, yellow squash, butternut squash, kale, spinach, sabino greens]  [] Riced veggies [e.g. cauliflower, broccoli, butternut  squash]  [] Edamame  all varieties  [] Green Giant  [] Veggie Spirals  [] Marinated Veggies [e.g. eggplant, peppers, zucchini]  [] Simply Steam [place] Sprouts  [] Birds Eye  [] Power Blend Italian Style  lentils, broccoli, kale, zucchini  []  [place] Sprouts & Carrots  [] Oven Roasters Broccoli & Cauliflower  [] [place] Blend  [] Tattooed   [] Green Bean Blend  [] Farmer's Market Ratatouille  [] Butter Balsamic Glazed Vegetables  [] Riced Cauliflower & Quinoa Mediterranean Style  [] Dorene's Good Life  Southern Style Greens [sauteed kale + onion]    FROZEN FRUITS  [] All unsweetened frozen fruits  all varieties  [] Dole Fruits & Veggie Blends  Berries 'n Kale  [] Dole Mix-ins  Triple Berry     FROZEN ENTREES  [] The Good Kitchen meals  all varieties [ e.g. Chili Lime Chicken Over Riced Cauliflower]  [] Premium Paleo  Not Ford Momma's Meatloaf  [] Primal Kitchen  Chicken Pesto + Steak Fajitas w/ Peppers & Onions  [] Eating Well Frozen Entrees  Butter Chicken Masala, Steak Carne Asada, Creamy Pesto Chicken, Chicken + Wild Rice Stroganoff, Yellow Gimenez Chicken, Sun-dried Tomato Chicken, Chicken Lo Mein  [] Realgood Entree Bowls  Norwegian Inspired Beef Bowl over Riced Cauliflower, Chicken Burrito Bowl   [] Great Karma Coconut Gimenez  [] Mayte's  T[place]ale Nick with Black Beans, Vegetable Lasagna  [] Kashi Mayan [place] Bake  [] Healthy Choice  Simply Steamers Chicken Fried Rice  [] Basil Pesto Chicken & Latvian Style Pork Power Bowls  [] Tattooed   Enchilada Bowl  [] Randy Farms  Spicy Black Bean Burgers    FROZEN PIZZAS  [] Cauli'flour Foods  Cauliflower Pizza Crusts  [] Outer Aisle  Cauliflower Crust  [] Mayte's  Veggie Crust Cheese Pizza  [] Quest Pizza     VEGETARIAN PRODUCTS  [] Beyond Meat  ground 'meat' + grilled 'chicken' strips  [] Tofurkey  Original Italian Sausage + Original Tempeh  [] Gardein  Beefless Ground + Meatless Meatballs  [] [facility] Mekhi   Original Burger, Crumbles, Meatballs    ICE CREAMS + FROZEN DESSERTS  [] Halo Top  regular + keto series, pops  [] Rebel  ice cream + dessert sandwiches  [] Enlightened  ice cream + bars  [] Nightfood  ice cream  [] Realgood  ice cream  [] Arctic Zero Fit  frozen pint  [] The Frozen Farmer  sorbets  [] Wholly Rollies  Protein Balls, all varieties  [] Dream Pops  Coconut Latte    FROZEN BREAKFASTS  [] Realgood  Breakfast Sandwiches on Cauliflower Cheesy Bread  [] Rebel  ice cream + dessert sandwiches  [] Enlightened  ice cream + bars  [] Nightfood  ice cream  [] Realgood  ice cream  [] Arctic Zero Fit  frozen pint  [] The Frozen Farmer  sorbets  [] Wholly Rollies  Protein Balls, all varieties  [] Dream Pops  Coconut Latte    BREADS/BUNS/WRAPS  [] Teddy Bread: All Types - In Freezer Section   [] Flat Out Light Wraps - All Varieties   [] Flat Out Protein Up Carb Down Flat Bread   [] Kontos Whole Wheat Pocket Lilian   [] Ruslan KRISTY 100% Whole Wheat Tortillas   [] LaTortilla Factory Tortillas - Smart & Delicious; 50 or 80-calorie   [] Nature's Own 100% Whole Wheat Bread   [] Orowheat Healthful - 100% Whole Wheat Slice Bread and [place] Thins   [] Orowheat Healthful - Whole Wheat Nuts & Grain Bread; Flax & Seed Bread   [] Pepperidge Farm Natural Whole Grain 15 Bread   [] Pepperidge Farm Natural Whole Grain English Muffin - 100% Whole Wheat   [] [place] Farm Very Thin 100% Whole Wheat   [] Kira Brendon 45 Calories and Delightful   [] Adithya' 100% Whole Wheat Thin-Sliced Bagels and English Muffins   [] Western Bagel: Perfect 10     GLUTEN FREE  [] Alber's Gluten Free Bread   [] [place] Bakehouse 7-Grain Gluten Free Bread     LEGUME PASTA   [] Explore Asian Organic Black Bean Spaghetti   [] Modern Table   [] Tolerant Foods       NUT BUTTERS & JELLIES    NUT BUTTERS   [] Better'n Chocolate: Coconut Chocolate Peanut Butter Spread   [] Better'n Peanut Butter - All Types   [] Earth Balance Coconut and Peanut Spread    [] Prince's Nut [place]   [] MaraNatha: All Natural Roasted Cashew Butter - Virgin or Creamy   [] MaraNatha: Roasted Peanut Butter   [] Nuts 'N More Peanut [place] - All Flavors   [] PB2 Powder - Original or Chocolate   [] Skippy Natural - Creamy, Super Chunk   [] Smart Balance Peanut Butter - Virgin or Creamy   [] Peanut Butter & Company:   [] Smooth , Crunch Time, The Heat Is On, Old Fashioned Smooth, Mighty Nut- Powdered Peanut Butter, Squeeze Pack   [] Smucker's Natural Peanut Butter - Virgin or Creamy   [] Sunbutter Nut Butter   [] Wild Friends Protein Peanut Butter/[place] o Butter - Vanilla or Chocolate     JELLIES  o Polaner's All Fruit   o Clearly Organic Best Choice: Strawberry Fruit Spread       SNACKS    BARS  [] Kashi Bars - Chewy or Crunchy; Honey [place] o Flax or Peanut Butter   [] KIND Bars - 5 Grams of Sugar or Less   [] KIND Protein Bars - Strong and KIND   [] Nature Valley Protein Bar - All Varieties   [] Nature Valley Roasted Nut Crunch - [place] Crunch; Peanut Crunch   [] [facility] Simple Nut Bar - Roasted Peanut & Honey   [] [facility] Simple Nut Bar - [place], Cashew & Sea Salt   [] [facility] Nut [place] Bar - Salted Caramel Peanut   [] Think Thin Protein Bars   [] Quest Bars, Power Crunch Bars, Pure Protein Bars     BEEF JERKY - NITRATE FREE   [] Game On   [] Grass Run Farms   [] Krave   [] Ostrim   [] Perky Jerky   [] Primal Strips Meatless Vegan Jerky   [] [place]     CHIPS   [] Beanitos Chips   [] Fruit Crisps - e.g. Brother's-All-Natural, Bare Fruit, Yoga Chips   [] Maria Teresa's Soy Crisps: 1.3 ounce bag   [] Quest Protein Chips   [] Wasa Whole Wheat Crisp Bread     CRACKERS  [] Diamond's Gone Crackers   [] Nabisco Triscuit: Regular and Thin Crisp Crackers   [] Vans Say Cheese Crackers (G-F)     POPCORN/NUTS   [] Stan Hammonds's Smart Pop Popcorn - Single Serving   [] 100-Calorie Pack of Nuts - All Varieties     PROTEIN POWDERS & DRINKS  []  Protein -  Whey  Protein Powder   [] Garden of Life Raw Protein Powder   [] Iconic Ready-To-Drink Protein Drink   [] Choi One Protein Powder   [] VegaSport Protein Powder     SALSA/HUMMUS/DIPS   [] Eat Well Embrace Life: Zesty Sriracha Carrot o Hummus   [] Pre-Portioned Guacamole Packs   [] Mayra's   [] Tostitos Restaurant Style Salsa       SOUPS   [] Mayte's Organic Soups - Lentil, Vegetable, Split Pea, Low-Sodium     CANNED GOODS   [] 100% Pure Pumpkin   [] BlueRunner Creole Cream-Style Red Beans or Navy Beans   [] Cajun Power Chicken Gumbo Base   [] Chicken of the Sea Delevan [place]   [] Juan Fresh Cut Sliced Beets   [] Hormel Breast of Chicken in Water   [] Le[place]er Tender Baby Whole Carrots   [] Glenny Tabasco Marissa Starter   [] Jeanist: Chunk Lite Tuna in Water, Gourmet Select Pouches   [] Jeanist: Yellowfin Tuna Fillets   [] Trappey's: Kidney, Butter, [place], Black Eye, Field, and Black Beans   [] JAKOB Cheng's Turnip Greens or Nato Spinach     CONDIMENTS/ SAUCES/SPREADS/ SPICES  [] Julio Darby's Magic Seasonings - Regular or Salt Free   [] Shawn Velasquez's Sauces - All Flavors   [] Laughing Cow Light - All Flavors   [] Dash Salt-Free Marinade - All Flavors   [] Saulo & Perla's: Heart Smart Pasta Sauces   [] Tabasco     SALAD DRESSINGS  [] Mariaelena's Naturals: Lite Honey Mustard   [] Andrea's Own: Lighten Up Salad Dressing - All Varieties   [] OPA Greek Yogurt Dressings - Ranch, Blue o Cheese, Caesar, Feta Dill     SWEETENERS  [] Sweet [place] [address] Sweetener   [] Swerve   [] Truvia     BEVERAGES  [] Coconut Water   [] Crystal Light PURE - All Flavors   [] Honest Tea: Just Green Tea, Unsweetened   [] Kombucha Tea   [] [place]   [] [place] Sisters Bloody Diamond Mix   [] Metromint - Zero-Sugar; All Natural Flavored   [] Annalisa - Plain or Flavored   [] Jovani Sidhu   [] Steaz - Zero-Sugar, All-Natural, Sparkling Tea   [] Tea Bags: Any Brand - e.g. George, Yogi, Tazzo, Celestial   [] V8 100% Vegetable Juice   [] Vitamin Water Zero    [] Water   [] Zevia - Stevia Sweetened Soft Drink     BEER/MAURY/LIQUORS  []Calhoun's Premier Light 64 Calories   [] Bud Select - 55 Calories   [] [place] Sisters Bloody Diamond Mix   [] Simpson Genuine Draft - 64 Calories   [] Red or White Wine - All Varieties     CEREALS: HOT/COLD   [] Petra'hadley Hester's Original Cereal  [] Jerry's Mill Oat Bran Hot Cereal - Cracked Wheat, Multi-Grain  [] Kashi GoLean Cereal  [] Kashi GoLean Hot Cereal packets - Vanilla; Honey Cinnamon  [] Irene's Special K Protein Cereal  [] Rhea's Steel Cut Malay Oatmeal  [] Nature's Path Smart Bran  [] Catholic Instant Oatmeal packet, Original  [] Catholic Old Fashioned Catholic Oats  [] Uncle Avelino's Whole Wheat & Flaxseed Original Cereal

## 2023-05-31 ENCOUNTER — PATIENT MESSAGE (OUTPATIENT)
Dept: PRIMARY CARE CLINIC | Facility: CLINIC | Age: 58
End: 2023-05-31
Payer: OTHER GOVERNMENT

## 2023-06-12 ENCOUNTER — PATIENT MESSAGE (OUTPATIENT)
Dept: PRIMARY CARE CLINIC | Facility: CLINIC | Age: 58
End: 2023-06-12
Payer: OTHER GOVERNMENT

## 2023-06-29 ENCOUNTER — OFFICE VISIT (OUTPATIENT)
Dept: PRIMARY CARE CLINIC | Facility: CLINIC | Age: 58
End: 2023-06-29
Payer: OTHER GOVERNMENT

## 2023-06-29 VITALS
HEART RATE: 77 BPM | RESPIRATION RATE: 18 BRPM | DIASTOLIC BLOOD PRESSURE: 80 MMHG | SYSTOLIC BLOOD PRESSURE: 122 MMHG | WEIGHT: 186.31 LBS | OXYGEN SATURATION: 97 % | HEIGHT: 61 IN | BODY MASS INDEX: 35.18 KG/M2 | TEMPERATURE: 97 F

## 2023-06-29 DIAGNOSIS — Z83.3 FAMILY HISTORY OF DIABETES MELLITUS (DM): ICD-10-CM

## 2023-06-29 DIAGNOSIS — E66.01 CLASS 2 SEVERE OBESITY DUE TO EXCESS CALORIES WITH SERIOUS COMORBIDITY AND BODY MASS INDEX (BMI) OF 35.0 TO 35.9 IN ADULT: ICD-10-CM

## 2023-06-29 PROBLEM — E66.812 CLASS 2 SEVERE OBESITY DUE TO EXCESS CALORIES WITH SERIOUS COMORBIDITY AND BODY MASS INDEX (BMI) OF 35.0 TO 35.9 IN ADULT: Status: ACTIVE | Noted: 2017-11-08

## 2023-06-29 PROCEDURE — 99213 PR OFFICE/OUTPT VISIT, EST, LEVL III, 20-29 MIN: ICD-10-PCS | Mod: S$PBB,,, | Performed by: FAMILY MEDICINE

## 2023-06-29 PROCEDURE — 99999 PR PBB SHADOW E&M-EST. PATIENT-LVL IV: ICD-10-PCS | Mod: PBBFAC,,, | Performed by: FAMILY MEDICINE

## 2023-06-29 PROCEDURE — 99214 OFFICE O/P EST MOD 30 MIN: CPT | Mod: PBBFAC | Performed by: FAMILY MEDICINE

## 2023-06-29 PROCEDURE — 99213 OFFICE O/P EST LOW 20 MIN: CPT | Mod: S$PBB,,, | Performed by: FAMILY MEDICINE

## 2023-06-29 PROCEDURE — 99999 PR PBB SHADOW E&M-EST. PATIENT-LVL IV: CPT | Mod: PBBFAC,,, | Performed by: FAMILY MEDICINE

## 2023-06-29 RX ORDER — PHENTERMINE AND TOPIRAMATE 7.5; 46 MG/1; MG/1
1 CAPSULE, EXTENDED RELEASE ORAL DAILY
Qty: 30 CAPSULE | Refills: 2 | Status: SHIPPED | OUTPATIENT
Start: 2023-06-29 | End: 2023-07-09

## 2023-06-29 NOTE — PROGRESS NOTES
Subjective   Pmhx, fam hx, soc hx, surg hx, allergies, med list reviewed  Wt Readings from Last 3 Encounters:   06/29/23 0749 84.5 kg (186 lb 4.6 oz)   05/25/23 1313 86.8 kg (191 lb 5.8 oz)   04/10/23 1531 86.5 kg (190 lb 11.2 oz)       Patient ID: Tova Muñoz is a 58 y.o. female.  Pt has lost 5 lb this month.     Chief Complaint: Establish Care and Follow-up (Follow up)  Pt is on qsymia. Home scale 183.6. She reports overall medication has been tolerable. She has some mild HA initially and then improved when starting and then dosage increase. Now has been resolved. She notices if eats too many carbs or soda. She is now on maintenance dosage.     Mild constipation. Added some salads. Has had more desire for this.     No exacerbation of anxiety. NO MH changes. Work has been     She has met water goals. (2 bottles/day).  Has not yet started stretching.       HPI  Review of Systems   Constitutional:  Negative for activity change, appetite change, fatigue and fever.   HENT:  Negative for mouth dryness and goiter.    Eyes:  Negative for visual disturbance.   Respiratory:  Negative for apnea, cough, chest tightness and shortness of breath.    Cardiovascular:  Negative for chest pain, palpitations and leg swelling.   Gastrointestinal:  Negative for abdominal pain, constipation and diarrhea.   Endocrine: Negative for cold intolerance, heat intolerance, polydipsia, polyphagia and polyuria.   Genitourinary:  Negative for frequency and menstrual problem.   Musculoskeletal:  Negative for arthralgias and myalgias.   Integumentary:  Negative for color change and rash.   Psychiatric/Behavioral:  Negative for sleep disturbance. The patient is not nervous/anxious.         Objective     Physical Exam  Vitals and nursing note reviewed.   Constitutional:       General: She is not in acute distress.  HENT:      Head: Normocephalic and atraumatic.   Eyes:      General: No scleral icterus.     Pupils: Pupils are equal, round, and  reactive to light.   Neck:      Comments: No TM  Cardiovascular:      Rate and Rhythm: Normal rate and regular rhythm.      Pulses: Normal pulses.      Heart sounds: Normal heart sounds. No murmur heard.    No friction rub. No gallop.   Pulmonary:      Effort: Pulmonary effort is normal. No respiratory distress.      Breath sounds: Normal breath sounds. No wheezing, rhonchi or rales.   Abdominal:      General: Bowel sounds are normal. There is no distension.      Palpations: Abdomen is soft.      Tenderness: There is no abdominal tenderness.   Musculoskeletal:         General: No swelling.      Cervical back: Normal range of motion and neck supple. No tenderness.   Lymphadenopathy:      Cervical: No cervical adenopathy.   Skin:     General: Skin is warm.      Findings: No erythema or rash.   Neurological:      Mental Status: She is alert and oriented to person, place, and time.   Psychiatric:         Mood and Affect: Mood normal.         Behavior: Behavior normal.              ICD-10-CM ICD-9-CM   1. Family history of diabetes mellitus (DM)  Z83.3 V18.0   2. Class 2 severe obesity due to excess calories with serious comorbidity and body mass index (BMI) of 35.0 to 35.9 in adult  E66.01 278.01    Z68.35 V85.35      Family history of diabetes mellitus (DM)  -     phentermine-topiramate (QSYMIA) 7.5-46 mg CM24; Take 1 capsule by mouth once daily. for 10 days  Dispense: 30 capsule; Refill: 2    Class 2 severe obesity due to excess calories with serious comorbidity and body mass index (BMI) of 35.0 to 35.9 in adult  -     phentermine-topiramate (QSYMIA) 7.5-46 mg CM24; Take 1 capsule by mouth once daily. for 10 days  Dispense: 30 capsule; Refill: 2     reviewed     Will refill medication for patient  Pt to send mychart update  She is waiting for nutrition appt  Goal: stretching

## 2023-07-27 ENCOUNTER — OFFICE VISIT (OUTPATIENT)
Dept: SURGERY | Facility: CLINIC | Age: 58
End: 2023-07-27
Payer: OTHER GOVERNMENT

## 2023-07-27 ENCOUNTER — OFFICE VISIT (OUTPATIENT)
Dept: INTERNAL MEDICINE | Facility: CLINIC | Age: 58
End: 2023-07-27
Payer: OTHER GOVERNMENT

## 2023-07-27 ENCOUNTER — LAB VISIT (OUTPATIENT)
Dept: LAB | Facility: HOSPITAL | Age: 58
End: 2023-07-27
Attending: FAMILY MEDICINE
Payer: OTHER GOVERNMENT

## 2023-07-27 VITALS
SYSTOLIC BLOOD PRESSURE: 129 MMHG | BODY MASS INDEX: 34.53 KG/M2 | WEIGHT: 182.75 LBS | HEART RATE: 82 BPM | DIASTOLIC BLOOD PRESSURE: 82 MMHG

## 2023-07-27 VITALS
SYSTOLIC BLOOD PRESSURE: 122 MMHG | OXYGEN SATURATION: 97 % | HEIGHT: 61 IN | RESPIRATION RATE: 16 BRPM | WEIGHT: 183.19 LBS | BODY MASS INDEX: 34.58 KG/M2 | HEART RATE: 73 BPM | DIASTOLIC BLOOD PRESSURE: 78 MMHG

## 2023-07-27 DIAGNOSIS — K64.5 THROMBOSED EXTERNAL HEMORRHOID: Primary | ICD-10-CM

## 2023-07-27 DIAGNOSIS — K64.4 EXTERNAL HEMORRHOID, BLEEDING: ICD-10-CM

## 2023-07-27 DIAGNOSIS — K64.4 EXTERNAL HEMORRHOID, BLEEDING: Primary | ICD-10-CM

## 2023-07-27 LAB
BASOPHILS # BLD AUTO: 0.02 K/UL (ref 0–0.2)
BASOPHILS NFR BLD: 0.4 % (ref 0–1.9)
DIFFERENTIAL METHOD: ABNORMAL
EOSINOPHIL # BLD AUTO: 0.1 K/UL (ref 0–0.5)
EOSINOPHIL NFR BLD: 1.5 % (ref 0–8)
ERYTHROCYTE [DISTWIDTH] IN BLOOD BY AUTOMATED COUNT: 12.8 % (ref 11.5–14.5)
HCT VFR BLD AUTO: 37.1 % (ref 37–48.5)
HGB BLD-MCNC: 12.2 G/DL (ref 12–16)
IMM GRANULOCYTES # BLD AUTO: 0.01 K/UL (ref 0–0.04)
IMM GRANULOCYTES NFR BLD AUTO: 0.2 % (ref 0–0.5)
LYMPHOCYTES # BLD AUTO: 1.7 K/UL (ref 1–4.8)
LYMPHOCYTES NFR BLD: 32 % (ref 18–48)
MCH RBC QN AUTO: 34.4 PG (ref 27–31)
MCHC RBC AUTO-ENTMCNC: 32.9 G/DL (ref 32–36)
MCV RBC AUTO: 105 FL (ref 82–98)
MONOCYTES # BLD AUTO: 0.5 K/UL (ref 0.3–1)
MONOCYTES NFR BLD: 8.7 % (ref 4–15)
NEUTROPHILS # BLD AUTO: 3 K/UL (ref 1.8–7.7)
NEUTROPHILS NFR BLD: 57.2 % (ref 38–73)
NRBC BLD-RTO: 0 /100 WBC
PLATELET # BLD AUTO: 151 K/UL (ref 150–450)
PMV BLD AUTO: 10.2 FL (ref 9.2–12.9)
RBC # BLD AUTO: 3.55 M/UL (ref 4–5.4)
WBC # BLD AUTO: 5.19 K/UL (ref 3.9–12.7)

## 2023-07-27 PROCEDURE — 99999 PR PBB SHADOW E&M-EST. PATIENT-LVL III: ICD-10-PCS | Mod: PBBFAC,,, | Performed by: SURGERY

## 2023-07-27 PROCEDURE — 46083 PR INCISE EXTERNAL HEMORRHOID: ICD-10-PCS | Mod: S$PBB,,, | Performed by: SURGERY

## 2023-07-27 PROCEDURE — 46083 INC THROMBOSED HROID XTRNL: CPT | Mod: S$PBB,,, | Performed by: SURGERY

## 2023-07-27 PROCEDURE — 85025 COMPLETE CBC W/AUTO DIFF WBC: CPT | Performed by: FAMILY MEDICINE

## 2023-07-27 PROCEDURE — 36415 COLL VENOUS BLD VENIPUNCTURE: CPT | Performed by: FAMILY MEDICINE

## 2023-07-27 PROCEDURE — 99999 PR PBB SHADOW E&M-EST. PATIENT-LVL IV: ICD-10-PCS | Mod: PBBFAC,,, | Performed by: FAMILY MEDICINE

## 2023-07-27 PROCEDURE — 99202 OFFICE O/P NEW SF 15 MIN: CPT | Mod: S$PBB,25,, | Performed by: SURGERY

## 2023-07-27 PROCEDURE — 99214 OFFICE O/P EST MOD 30 MIN: CPT | Mod: S$PBB,,, | Performed by: FAMILY MEDICINE

## 2023-07-27 PROCEDURE — 99214 OFFICE O/P EST MOD 30 MIN: CPT | Mod: PBBFAC,25 | Performed by: FAMILY MEDICINE

## 2023-07-27 PROCEDURE — 99999 PR PBB SHADOW E&M-EST. PATIENT-LVL III: CPT | Mod: PBBFAC,,, | Performed by: SURGERY

## 2023-07-27 PROCEDURE — 99214 PR OFFICE/OUTPT VISIT, EST, LEVL IV, 30-39 MIN: ICD-10-PCS | Mod: S$PBB,,, | Performed by: FAMILY MEDICINE

## 2023-07-27 PROCEDURE — 46083 INC THROMBOSED HROID XTRNL: CPT | Mod: PBBFAC | Performed by: SURGERY

## 2023-07-27 PROCEDURE — 99202 PR OFFICE/OUTPT VISIT, NEW, LEVL II, 15-29 MIN: ICD-10-PCS | Mod: S$PBB,25,, | Performed by: SURGERY

## 2023-07-27 PROCEDURE — 99999 PR PBB SHADOW E&M-EST. PATIENT-LVL IV: CPT | Mod: PBBFAC,,, | Performed by: FAMILY MEDICINE

## 2023-07-27 PROCEDURE — 99213 OFFICE O/P EST LOW 20 MIN: CPT | Mod: PBBFAC,27 | Performed by: SURGERY

## 2023-07-27 RX ORDER — HYDROCORTISONE ACETATE PRAMOXINE HCL 2.5; 1 G/100G; G/100G
CREAM TOPICAL 3 TIMES DAILY
Qty: 30 G | Refills: 0 | Status: SHIPPED | OUTPATIENT
Start: 2023-07-27 | End: 2023-12-22 | Stop reason: ALTCHOICE

## 2023-07-27 RX ORDER — HYDROCODONE BITARTRATE AND ACETAMINOPHEN 5; 325 MG/1; MG/1
TABLET ORAL
Qty: 15 TABLET | Refills: 0 | Status: SHIPPED | OUTPATIENT
Start: 2023-07-27 | End: 2023-08-17

## 2023-07-27 NOTE — PROCEDURES
Hemorrhoidectomy    Date/Time: 7/27/2023 3:20 PM  Performed by: Clarke Naranjo MD  Authorized by: Clarke Naranjo MD   Preparation: Patient was prepped and draped in the usual sterile fashion.  Local anesthesia used: yes    Anesthesia:  Local anesthesia used: yes  Local Anesthetic: lidocaine 1% with epinephrine  Anesthetic total: 8 mL    Sedation:  Patient sedated: no    Patient tolerance: patient tolerated the procedure well with no immediate complications  Comments: The patient was placed in the operating table in the suzy-knife position.  A time-out was performed.  The buttock cheeks were taped in distraction.  The left-sided perianal area at the site of the thrombosed hemorrhoid was prepped and draped in the standard fashion.      A time-out was performed.      10 cc 1% lidocaine was infiltrated.  The opening in the thrombosed hemorrhoid was extended medially and laterally.  The clot and thrombosed hemorrhoidal veins were removed with a hemostat.  Hemostasis was achieved with direct pressure.  Dressings were placed.      Patient tolerated procedure well.      Postop instructions were provided

## 2023-07-27 NOTE — PROGRESS NOTES
Patient ID: Tova Muñoz is a 58 y.o. female.    Chief Complaint: Consult (Hemorrhoids)      HPI:  Patient developed constipation over the weekend.  She was straining to move her bowels and she noticed a lump that was painful and the perianal area.  She began to have some bleeding.  She saw her primary provider.  Surgery was consulted for a thrombosed external hemorrhoid.      Patient states that the pain is moderate in intensity.  The bleeding just started today.  She is never had this issue before.  She is had some mild hemorrhoid issues since the birth of    Review of Systems   Gastrointestinal:  Positive for anal bleeding.   All other systems reviewed and are negative.    Current Outpatient Medications   Medication Sig Dispense Refill    cetirizine (ZYRTEC) 10 MG tablet Take 10 mg by mouth once daily.      citalopram (CELEXA) 20 MG tablet Take 1 tablet (20 mg total) by mouth once daily. 90 tablet 3    clobetasol 0.05% (TEMOVATE) 0.05 % Oint Apply topically 2 (two) times daily. To nail beds 60 g 0    estradioL (ESTRACE) 0.01 % (0.1 mg/gram) vaginal cream Place 1/4 applicator intravaginally, nightly for 1-2 weeks then decrease to 3-4 times per week. (Patient not taking: Reported on 7/27/2023) 42.5 g 11    HYDROcodone-acetaminophen (NORCO) 5-325 mg per tablet Take 1 tablet by mouth every 6 hours as needed for pain 15 tablet 0    hydrocortisone-pramoxine (ANALPRAM-HC) 2.5-1 % Crea Place rectally 3 (three) times daily. 30 g 0    multivit-min/folic acid/biotin (HAIR,SKIN AND NAILS,FA-BIOTIN, ORAL) Take by mouth.      phentermine/topiramate (QSYMIA ORAL) Take 1 capsule by mouth once daily.      TREXIMET  mg Tab Take 1 tablet by mouth continuous prn.       No current facility-administered medications for this visit.       Review of patient's allergies indicates:   Allergen Reactions    Sulfa (sulfonamide antibiotics) Swelling       Past Medical History:   Diagnosis Date    Allergy     BCC (basal cell carcinoma  of skin)     nose    Colon polyp     Migraine headache     Sinusitis        Past Surgical History:   Procedure Laterality Date    AUGMENTATION OF BREAST      1994    BASAL CELL CARCINOMA EXCISION  2015    breast augumentation      BREAST SURGERY Bilateral 1989    saline implants     SECTION      x2    COLONOSCOPY N/A 2016    Procedure: COLONOSCOPY;  Surgeon: Dali Rodriguez MD;  Location: [facility] ENDO;  Service: Endoscopy;  Laterality: N/A;    COLONOSCOPY N/A 08/15/2022    Procedure: COLONOSCOPY;  Surgeon: Shayy William MD;  Location: [facility] ENDO;  Service: Endoscopy;  Laterality: N/A;    EYE SURGERY      Lasik    LASIX      SKIN BIOPSY      TUBAL LIGATION         Family History   Problem Relation Age of Onset    Asthma Mother     Diabetes Mother     Emphysema Mother     Hypertension Mother     Basal cell carcinoma Mother     Melanoma Mother     Arthritis Mother     Cancer Mother         Basal cell and a melanoma    COPD Mother     Asthma Sister     Cancer Sister         Basal Cell    Basal cell carcinoma Sister     Vulvar Cancer Sister     Heart disease Father         CHF    Cancer Father         Lung Cancer    COPD Father     Cancer Paternal Grandmother         Uterine    Ovarian cancer Paternal Grandmother     Diabetes Maternal Grandfather     Arthritis Maternal Grandmother     Miscarriages / Stillbirths Maternal Grandmother     Arthritis Maternal Aunt     Diabetes Maternal Aunt     Cancer Sister         Vulvar Cancer (Squamous Cell)    Depression Sister     Diabetes Maternal Uncle     Eczema Neg Hx     Lupus Neg Hx     Psoriasis Neg Hx        Social History     Socioeconomic History    Marital status:    Occupational History    Occupation: Insurance    Tobacco Use    Smoking status: Never    Smokeless tobacco: Never   Substance and Sexual Activity    Alcohol use: Yes     Alcohol/week: 2.0 - 3.0 standard drinks     Types: 2 - 3 Cans of beer per week    Drug use: Never    Sexual  activity: Yes     Partners: Male     Birth control/protection: Post-menopausal, See Surgical Hx   Other Topics Concern    Are you pregnant or think you may be? No    Breast-feeding No     Social Determinants of Health     Financial Resource Strain: Low Risk     Difficulty of Paying Living Expenses: Not hard at all   Food Insecurity: No Food Insecurity    Worried About Running Out of Food in the Last Year: Never true    Ran Out of Food in the Last Year: Never true   Transportation Needs: No Transportation Needs    Lack of Transportation (Medical): No    Lack of Transportation (Non-Medical): No   Physical Activity: Insufficiently Active    Days of Exercise per Week: 1 day    Minutes of Exercise per Session: 30 min   Stress: Stress Concern Present    Feeling of Stress : Very much   Social Connections: Unknown    Frequency of Communication with Friends and Family: More than three times a week    Frequency of Social Gatherings with Friends and Family: Once a week    Active Member of Clubs or Organizations: No    Attends Club or Organization Meetings: Patient refused    Marital Status:    Housing Stability: Low Risk     Unable to Pay for Housing in the Last Year: No    Number of Places Lived in the Last Year: 1    Unstable Housing in the Last Year: No       Vitals:    07/27/23 1512   BP: 129/82   Pulse: 82       Physical Exam  Vitals reviewed.   Constitutional:       Appearance: She is obese.   Genitourinary:     Comments: There is a left-sided thrombosed external hemorrhoid with a opening and exposed clot  Neurological:      Mental Status: She is alert.     Assessment & Plan:    Thrombosed external hemorrhoid.      I discussed conservative versus operative management.  I reviewed the risks benefits and complications.  The patient has agreed to thrombectomy.  The risks include infection bleeding.

## 2023-07-27 NOTE — PROGRESS NOTES
"Subjective:       Patient ID: Tova Muñoz is a 58 y.o. female.    Chief Complaint: Hemorrhoids    58-year-old female patient with Patient Active Problem List:     Seasonal allergies     Colon cancer screening     Class 2 severe obesity due to excess calories with serious comorbidity and body mass index (BMI) of 35.0 to 35.9 in adult     Chronic nonintractable headache     Hot flashes due to menopause     Elevated BP without diagnosis of hypertension     Weakness of both hips     Difficulty navigating stairs     Weakness of right lower extremity     NIKOLAS (generalized anxiety disorder)     Family history of diabetes mellitus (DM)  She started having bleeding hemorrhoid since past few days and has been not having a bowel movement for the past couple of days  Patient has been using over-the-counter preparation H with no relief  Started having abdominal cramping since morning but denies any fever nausea vomiting or dizziness  Reports having a road trip last week    Review of Systems   Gastrointestinal:  Positive for blood in stool, constipation and rectal pain.       /78 (BP Location: Left arm, Patient Position: Sitting, BP Method: Large (Manual))   Pulse 73   Resp 16   Ht 5' 1" (1.549 m)   Wt 83.1 kg (183 lb 3.2 oz)   LMP 08/06/2018   SpO2 97%   BMI 34.62 kg/m²   Objective:      Physical Exam  Constitutional:       Appearance: She is well-developed.   HENT:      Head: Normocephalic and atraumatic.   Cardiovascular:      Rate and Rhythm: Normal rate and regular rhythm.      Heart sounds: Normal heart sounds. No murmur heard.  Pulmonary:      Effort: Pulmonary effort is normal.      Breath sounds: Normal breath sounds. No wheezing.   Abdominal:      General: Bowel sounds are normal.      Palpations: Abdomen is soft.      Tenderness: There is no abdominal tenderness.   Genitourinary:     Comments: Positive for external bleeding hemorrhoid  Skin:     General: Skin is warm and dry.      Findings: No rash. "   Neurological:      Mental Status: She is alert and oriented to person, place, and time.   Psychiatric:         Mood and Affect: Mood normal.         Assessment/Plan:   1. External hemorrhoid, bleeding  -     CBC Auto Differential; Future; Expected date: 07/27/2023  -     hydrocortisone-pramoxine (ANALPRAM-HC) 2.5-1 % Crea; Place rectally 3 (three) times daily.  Dispense: 30 g; Refill: 0  -     Ambulatory referral/consult to General Surgery; Future; Expected date: 08/03/2023  Will check further labs to make sure that patient is not drop in H&H and will refer to general surgery for possible hemorrhoidal repair  Analpram cream prescribed today for symptomatic relief if any worsening advised to go to ER immediately

## 2023-07-27 NOTE — PATIENT INSTRUCTIONS
Experience a little bit of bleeding from the anal area.  This usually resolves in a couple days.      You need to soak in the tub after bowel movements and 2 or 3 times during the day.  It is okay if you skip a few times when your caring for her grandchildren.      Is also port into avoid constipation.  I would recommend getting Glycolax or MiraLax, store brand or fine and take per the package directions while you were taking the pain medicine or if you become constipated again.      If you have any questions or concerns please call the office      Our office phone numbers are  [phone] and

## 2023-08-10 ENCOUNTER — OFFICE VISIT (OUTPATIENT)
Dept: URGENT CARE | Facility: CLINIC | Age: 58
End: 2023-08-10
Payer: OTHER GOVERNMENT

## 2023-08-10 VITALS
TEMPERATURE: 98 F | RESPIRATION RATE: 12 BRPM | SYSTOLIC BLOOD PRESSURE: 138 MMHG | HEART RATE: 71 BPM | HEIGHT: 61 IN | WEIGHT: 182 LBS | OXYGEN SATURATION: 97 % | DIASTOLIC BLOOD PRESSURE: 77 MMHG | BODY MASS INDEX: 34.36 KG/M2

## 2023-08-10 DIAGNOSIS — R09.82 PND (POST-NASAL DRIP): ICD-10-CM

## 2023-08-10 DIAGNOSIS — R09.81 SINUS CONGESTION: ICD-10-CM

## 2023-08-10 DIAGNOSIS — R05.9 COUGH, UNSPECIFIED TYPE: ICD-10-CM

## 2023-08-10 DIAGNOSIS — H65.93 BILATERAL OTITIS MEDIA WITH EFFUSION: Primary | ICD-10-CM

## 2023-08-10 LAB
CTP QC/QA: YES
SARS-COV-2 AG RESP QL IA.RAPID: NEGATIVE

## 2023-08-10 PROCEDURE — 96372 THER/PROPH/DIAG INJ SC/IM: CPT | Mod: S$GLB,,, | Performed by: PHYSICIAN ASSISTANT

## 2023-08-10 PROCEDURE — 99214 PR OFFICE/OUTPT VISIT, EST, LEVL IV, 30-39 MIN: ICD-10-PCS | Mod: 25,S$GLB,, | Performed by: PHYSICIAN ASSISTANT

## 2023-08-10 PROCEDURE — 87811 SARS-COV-2 COVID19 W/OPTIC: CPT | Mod: QW,S$GLB,, | Performed by: PHYSICIAN ASSISTANT

## 2023-08-10 PROCEDURE — 96372 PR INJECTION,THERAP/PROPH/DIAG2ST, IM OR SUBCUT: ICD-10-PCS | Mod: S$GLB,,, | Performed by: PHYSICIAN ASSISTANT

## 2023-08-10 PROCEDURE — 87811 SARS CORONAVIRUS 2 ANTIGEN POCT, MANUAL READ: ICD-10-PCS | Mod: QW,S$GLB,, | Performed by: PHYSICIAN ASSISTANT

## 2023-08-10 PROCEDURE — 99214 OFFICE O/P EST MOD 30 MIN: CPT | Mod: 25,S$GLB,, | Performed by: PHYSICIAN ASSISTANT

## 2023-08-10 RX ORDER — AMOXICILLIN 500 MG/1
500 CAPSULE ORAL EVERY 12 HOURS
Qty: 20 CAPSULE | Refills: 0 | Status: SHIPPED | OUTPATIENT
Start: 2023-08-10 | End: 2023-08-20

## 2023-08-10 RX ORDER — LEVOCETIRIZINE DIHYDROCHLORIDE 5 MG/1
5 TABLET, FILM COATED ORAL NIGHTLY
Qty: 30 TABLET | Refills: 0 | Status: SHIPPED | OUTPATIENT
Start: 2023-08-10 | End: 2023-12-22 | Stop reason: ALTCHOICE

## 2023-08-10 RX ORDER — DEXAMETHASONE SODIUM PHOSPHATE 100 MG/10ML
8 INJECTION INTRAMUSCULAR; INTRAVENOUS
Status: COMPLETED | OUTPATIENT
Start: 2023-08-10 | End: 2023-08-10

## 2023-08-10 RX ADMIN — DEXAMETHASONE SODIUM PHOSPHATE 8 MG: 100 INJECTION INTRAMUSCULAR; INTRAVENOUS at 12:08

## 2023-08-10 NOTE — PATIENT INSTRUCTIONS
You received a steroid shot today. Please keep in mind that you should not take long term steroids unless prescribed by your physician. You should not exceed 4 steroid injections in a year and if you have multiple injections they should be spaced out adequately (atleast 3 months apart). As discussed, there are potential risks/side effects with steroid injections, which are listed below. Please contact your doctor if you experience any of these side effects.     Potential Side Effects of Steroid Injection:  Elevated blood pressure  If you have high blood pressure, please monitor your blood pressure over the next 2-3 days.   Elevated blood sugar  If you have diabetes this injection will raise your blood sugar. This will normalize over the next 2-3 days. Please make sure you monitor you blood sugar accordingly.   Increased heart rate   Increased energy or nervousness  Increased appetite, weight gain, and water retention.   Insomnia  Irritability or mood changes   Changes in vision  Seek medical attention immediately if this occurs  Seizure  Allergic reaction  Redness/flushing to the face  Temporary pain and/or bruising at the injection site  Loss of fat at the injection site, causing dimpling in the skin which could be permanent  Paleness of skin at injection site, which could be permanent  Infection at the injection site

## 2023-08-10 NOTE — PROGRESS NOTES
"Subjective:      Patient ID: Tova Muñoz is a 58 y.o. female.    Vitals:  height is 5' 1" (1.549 m) and weight is 82.6 kg (182 lb). Her tympanic temperature is 98 °F (36.7 °C). Her blood pressure is 138/77 and her pulse is 71. Her respiration is 12 and oxygen saturation is 97%.     Chief Complaint: Cough    Patient presents today with a productive cough, painful/stopped up ears, pnd and sinus congestion. Patient was out of town recently, she flew to and back from [place]. Her symptoms started while in [place]. Started antihistamine while she was there but got no relief, symptoms worsening. Patient returns home yesterday.    Cough  This is a new problem. The current episode started in the past 7 days. The problem has been gradually worsening. The problem occurs constantly. The cough is Productive of sputum. Associated symptoms include ear congestion, ear pain, headaches, myalgias, nasal congestion, postnasal drip and rhinorrhea. Pertinent negatives include no chills, fever, sore throat, shortness of breath or wheezing. Treatments tried: sudafed, zyrtec, flonase, mucinex fast max. The treatment provided no relief.       Constitution: Negative for chills, sweating and fever.   HENT:  Positive for ear pain, hearing loss, postnasal drip and sinus pressure. Negative for ear discharge, sore throat and trouble swallowing.    Neck: neck negative.   Cardiovascular: Negative.    Respiratory:  Positive for cough and sputum production. Negative for shortness of breath and wheezing.    Gastrointestinal: Negative.    Musculoskeletal:  Positive for muscle ache.   Neurological:  Positive for headaches.      Objective:     Physical Exam   Constitutional: She appears well-developed.  Non-toxic appearance. She does not appear ill. No distress.   HENT:   Head: Normocephalic and atraumatic.   Ears:   Right Ear: External ear and ear canal normal. There is tenderness. Tympanic membrane is erythematous and bulging. Tympanic membrane is " not perforated. A middle ear effusion is present.   Left Ear: External ear and ear canal normal. There is tenderness. Tympanic membrane is erythematous and bulging. Tympanic membrane is not perforated. A middle ear effusion is present.   Nose: Mucosal edema and congestion present.   Mouth/Throat: Uvula is midline and mucous membranes are normal. Mucous membranes are moist. Posterior oropharyngeal erythema and cobblestoning present. No oropharyngeal exudate.   Eyes: Conjunctivae and EOM are normal.   Neck: Neck supple.   Pulmonary/Chest: Effort normal and breath sounds normal.   Abdominal: Normal appearance.   Musculoskeletal: Normal range of motion.         General: Normal range of motion.   Lymphadenopathy:     She has no cervical adenopathy.   Neurological: no focal deficit. She is alert. She displays no weakness. Gait normal.   Skin: Skin is warm, dry, not diaphoretic, not pale and no rash.   Psychiatric: Her behavior is normal.       Results for orders placed or performed in visit on 08/10/23   SARS Coronavirus 2 Antigen, POCT Manual Read   Result Value Ref Range    SARS Coronavirus 2 Antigen Negative Negative     Acceptable Yes        Assessment:     1. Bilateral otitis media with effusion    2. Cough, unspecified type    3. PND (post-nasal drip)    4. Sinus congestion        Plan:     Patient requested steroid injection. Discussed risks & possible side effects of steroid injection. Pt verbalized understanding of risks associated with injection and wished to proceed with treatment. Bilateral AOM, likely worsened with flying recently.  Patient advised to take full course of antibiotics.  Will change her daily antihistamine and have her start using Xyzal nightly.  Continue ibuprofen or Tylenol as needed for fever and/or pain.  Close follow-up with PCP if symptoms worsen or fail to improve.      Bilateral otitis media with effusion  -     amoxicillin (AMOXIL) 500 MG capsule; Take 1 capsule (500 mg  total) by mouth every 12 (twelve) hours. for 10 days  Dispense: 20 capsule; Refill: 0    Cough, unspecified type  -     SARS Coronavirus 2 Antigen, POCT Manual Read  -     dexAMETHasone injection 8 mg    PND (post-nasal drip)  -     SARS Coronavirus 2 Antigen, POCT Manual Read  -     dexAMETHasone injection 8 mg  -     levocetirizine (XYZAL) 5 MG tablet; Take 1 tablet (5 mg total) by mouth every evening.  Dispense: 30 tablet; Refill: 0    Sinus congestion  -     SARS Coronavirus 2 Antigen, POCT Manual Read  -     dexAMETHasone injection 8 mg  -     levocetirizine (XYZAL) 5 MG tablet; Take 1 tablet (5 mg total) by mouth every evening.  Dispense: 30 tablet; Refill: 0

## 2023-08-13 ENCOUNTER — TELEPHONE (OUTPATIENT)
Dept: URGENT CARE | Facility: CLINIC | Age: 58
End: 2023-08-13
Payer: OTHER GOVERNMENT

## 2023-08-13 NOTE — TELEPHONE ENCOUNTER
Called patient with a courtesy call after recent visit at urgent care, pt states she still have congestion. I informed patient that if her symptoms are not better in the next couple day, f/u with PCP or come back to see us.

## 2023-08-17 ENCOUNTER — OFFICE VISIT (OUTPATIENT)
Dept: SURGERY | Facility: CLINIC | Age: 58
End: 2023-08-17
Payer: OTHER GOVERNMENT

## 2023-08-17 VITALS
WEIGHT: 178.81 LBS | HEART RATE: 75 BPM | SYSTOLIC BLOOD PRESSURE: 122 MMHG | DIASTOLIC BLOOD PRESSURE: 78 MMHG | BODY MASS INDEX: 33.78 KG/M2

## 2023-08-17 DIAGNOSIS — K64.4 EXTERNAL HEMORRHOID, BLEEDING: Primary | ICD-10-CM

## 2023-08-17 DIAGNOSIS — K64.5 THROMBOSED EXTERNAL HEMORRHOID: ICD-10-CM

## 2023-08-17 PROCEDURE — 99024 PR POST-OP FOLLOW-UP VISIT: ICD-10-PCS | Mod: ,,, | Performed by: SURGERY

## 2023-08-17 PROCEDURE — 99999 PR PBB SHADOW E&M-EST. PATIENT-LVL III: CPT | Mod: PBBFAC,,, | Performed by: SURGERY

## 2023-08-17 PROCEDURE — 99213 OFFICE O/P EST LOW 20 MIN: CPT | Mod: PBBFAC | Performed by: SURGERY

## 2023-08-17 PROCEDURE — 99999 PR PBB SHADOW E&M-EST. PATIENT-LVL III: ICD-10-PCS | Mod: PBBFAC,,, | Performed by: SURGERY

## 2023-08-17 PROCEDURE — 99024 POSTOP FOLLOW-UP VISIT: CPT | Mod: ,,, | Performed by: SURGERY

## 2023-08-17 NOTE — PROGRESS NOTES
Subjective:       Patient ID: Tova Muñoz is a 58 y.o. female.    Chief Complaint: Post-op Evaluation    Returns after a thrombectomy from a thrombosed external hemorrhoid.      No anal pain or swelling  Review of Systems   Gastrointestinal:         Anal pain and swelling has resolved     Objective:      Physical Exam  Vitals reviewed.   Genitourinary:     Comments: The site of the thrombosed external hemorrhoid thrombectomy has healed  Neurological:      Mental Status: She is alert.       Assessment:    Patient was doing well after excision of a thrombosed external hemorrhoid    Plan:       Activity as tolerated.  Follow up with surgery as needed.      We did discuss drinking plenty of water and avoiding constipation with fiber supplements

## 2023-08-17 NOTE — PATIENT INSTRUCTIONS
The site of the thrombosed hemorrhoid is healed well.      If you run into any additional problems please frail feeds call the office to schedule an appointment with either 1 of the general surgeons or 1 of the colorectal surgeon    Our office phone numbers are  [phone] and

## 2023-08-30 ENCOUNTER — TELEPHONE (OUTPATIENT)
Dept: PRIMARY CARE CLINIC | Facility: CLINIC | Age: 58
End: 2023-08-30
Payer: OTHER GOVERNMENT

## 2023-09-20 ENCOUNTER — PATIENT MESSAGE (OUTPATIENT)
Dept: PRIMARY CARE CLINIC | Facility: CLINIC | Age: 58
End: 2023-09-20
Payer: OTHER GOVERNMENT

## 2023-09-20 DIAGNOSIS — E66.01 CLASS 2 SEVERE OBESITY DUE TO EXCESS CALORIES WITH SERIOUS COMORBIDITY AND BODY MASS INDEX (BMI) OF 35.0 TO 35.9 IN ADULT: Primary | ICD-10-CM

## 2023-09-20 DIAGNOSIS — E66.9 CLASS 1 OBESITY WITH BODY MASS INDEX (BMI) OF 32.0 TO 32.9 IN ADULT, UNSPECIFIED OBESITY TYPE, UNSPECIFIED WHETHER SERIOUS COMORBIDITY PRESENT: ICD-10-CM

## 2023-09-20 RX ORDER — PHENTERMINE AND TOPIRAMATE 7.5; 46 MG/1; MG/1
1 CAPSULE, EXTENDED RELEASE ORAL DAILY
Qty: 30 CAPSULE | Refills: 0 | Status: SHIPPED | OUTPATIENT
Start: 2023-09-20 | End: 2023-10-26 | Stop reason: SDUPTHER

## 2023-10-02 DIAGNOSIS — R03.0 ELEVATED BP WITHOUT DIAGNOSIS OF HYPERTENSION: ICD-10-CM

## 2023-10-02 DIAGNOSIS — E66.01 CLASS 2 SEVERE OBESITY DUE TO EXCESS CALORIES WITH SERIOUS COMORBIDITY AND BODY MASS INDEX (BMI) OF 35.0 TO 35.9 IN ADULT: ICD-10-CM

## 2023-10-02 DIAGNOSIS — Z83.3 FAMILY HISTORY OF DIABETES MELLITUS (DM): ICD-10-CM

## 2023-10-06 ENCOUNTER — OFFICE VISIT (OUTPATIENT)
Dept: URGENT CARE | Facility: CLINIC | Age: 58
End: 2023-10-06
Payer: OTHER GOVERNMENT

## 2023-10-06 VITALS
RESPIRATION RATE: 18 BRPM | HEART RATE: 72 BPM | BODY MASS INDEX: 33.05 KG/M2 | WEIGHT: 175.06 LBS | SYSTOLIC BLOOD PRESSURE: 136 MMHG | HEIGHT: 61 IN | OXYGEN SATURATION: 98 % | DIASTOLIC BLOOD PRESSURE: 63 MMHG | TEMPERATURE: 98 F

## 2023-10-06 DIAGNOSIS — R35.0 FREQUENCY OF URINATION: Primary | ICD-10-CM

## 2023-10-06 LAB
BILIRUB UR QL STRIP: NEGATIVE
GLUCOSE UR QL STRIP: NEGATIVE
KETONES UR QL STRIP: NEGATIVE
LEUKOCYTE ESTERASE UR QL STRIP: NEGATIVE
PH, POC UA: 6.5
POC BLOOD, URINE: NEGATIVE
POC NITRATES, URINE: NEGATIVE
PROT UR QL STRIP: NEGATIVE
SP GR UR STRIP: 1.01 (ref 1–1.03)
UROBILINOGEN UR STRIP-ACNC: NORMAL (ref 0.1–1.1)

## 2023-10-06 PROCEDURE — 99214 OFFICE O/P EST MOD 30 MIN: CPT | Mod: S$GLB,,, | Performed by: EMERGENCY MEDICINE

## 2023-10-06 PROCEDURE — 87186 SC STD MICRODIL/AGAR DIL: CPT | Performed by: EMERGENCY MEDICINE

## 2023-10-06 PROCEDURE — 99214 PR OFFICE/OUTPT VISIT, EST, LEVL IV, 30-39 MIN: ICD-10-PCS | Mod: S$GLB,,, | Performed by: EMERGENCY MEDICINE

## 2023-10-06 PROCEDURE — 81003 POCT URINALYSIS, DIPSTICK, AUTOMATED, W/O SCOPE: ICD-10-PCS | Mod: QW,S$GLB,, | Performed by: EMERGENCY MEDICINE

## 2023-10-06 PROCEDURE — 87077 CULTURE AEROBIC IDENTIFY: CPT | Performed by: EMERGENCY MEDICINE

## 2023-10-06 PROCEDURE — 81003 URINALYSIS AUTO W/O SCOPE: CPT | Mod: QW,S$GLB,, | Performed by: EMERGENCY MEDICINE

## 2023-10-06 PROCEDURE — 87088 URINE BACTERIA CULTURE: CPT | Performed by: EMERGENCY MEDICINE

## 2023-10-06 PROCEDURE — 87086 URINE CULTURE/COLONY COUNT: CPT | Performed by: EMERGENCY MEDICINE

## 2023-10-06 RX ORDER — NITROFURANTOIN 25; 75 MG/1; MG/1
100 CAPSULE ORAL 2 TIMES DAILY
Qty: 10 CAPSULE | Refills: 0 | Status: SHIPPED | OUTPATIENT
Start: 2023-10-06 | End: 2023-10-11

## 2023-10-06 NOTE — PATIENT INSTRUCTIONS
Macrobid as prescribed for 5 days.  Continue Cystex for 2 days for relief of symptoms.  On day 3 if you still have symptoms present, please notify clinic.  A urine culture may need to be done at that time.    Drink plenty of water and rest.      Patient Education       Acute Cystitis Discharge Instructions   About this topic   Acute cystitis is irritation of the bladder. It is often caused by germs getting into the urinary tract. The urinary tract includes the kidneys, ureters, bladder, and urethra. The urethra is a tube at the bottom of the bladder. Urine flows out of this tube. The germs enter the urethra and then spread in the bladder. These germs may cause an infection in the bladder or urinary tract. This condition may also be caused by irritation from things like bubble baths, sanitary pads, sex, certain drugs, or certain foods. This condition is more common if you are pregnant.         What care is needed at home?   Ask your doctor what you need to do when you go home. Make sure you ask questions if you do not understand what the doctor says.  For the first day or so, you may want to take an over-the-counter medicine, like phenazopyridine. This will help to numb your bladder. You will also not have the strong urge to urinate. This medicine causes your urine and tears to look orange. If you have kidney disease, talk to your doctor before taking this medicine.  To lower your chance of getting a UTI in the future, you can:  Drink extra fluids.  If you have sex, urinate right afterwards.  Apply a warm compress or warm water bottle to your lower belly to lessen pain.  Practice good hygiene. Wipe from front to back after going to the toilet.  Do not use scented tampons, soap, or toilet paper.  Keep your genital area clean. Wash daily with soap and water.  Take showers instead of tub baths.  Do not use douches or genital hygiene sprays.  What follow-up care is needed?   Your doctor may ask you to make visits to the  office to check on your progress. Be sure to keep these visits.  What drugs may be needed?   The doctor may order drugs to:  Help with pain or swelling  Help with the urge or need to pass urine often  Fight an infection  Will physical activity be limited?   Physical activities will not be limited. You may have to pass urine more often.  What changes to diet are needed?   Do not drink beer, wine, and mixed drinks (alcohol) or caffeine. These can bother the bladder.  Talk to your doctor about drinking cranberry juice or taking cranberry tablets.  What problems could happen?   Kidney infection  Blood stream infection or sepsis  Blood in the urine  When do I need to call the doctor?   You have very bad pain in your back, shoulder, or belly.  You have a fever of 100.4°F (38°C) or higher; shaking chills or sweats even though you are taking antibiotics.  You notice more blood in your urine.  Your signs get worse or do not improve within 24 hours of starting treatment.  You are not able to urinate for more than 8 hours.  Your signs come back after treatment has stopped.  Teach Back: Helping You Understand   The Teach Back Method helps you understand the information we are giving you. After you talk with the staff, tell them in your own words what you learned. This helps to make sure the staff has described each thing clearly. It also helps to explain things that may have been confusing. Before going home, make sure you can do these:  I can tell you about my condition.  I can tell you what are good fluids for me to drink and how often I should try to go to the bathroom.  I can tell you what I will do if I have a fever; chills; pain with passing urine; blood in my urine; or back, side, or belly pain.  Where can I learn more?   Better Health Channel  https://www.betterhealth.[place].gov.[place]/health/ConditionsAndTreatments/cystitis   NHS Choices  http://www.nhs.uk/conditions/Cystitis/Pages/Introduction.aspx   Last Reviewed Date    2021-06-09  Consumer Information Use and Disclaimer   This information is not specific medical advice and does not replace information you receive from your health care provider. This is only a brief summary of general information. It does NOT include all information about conditions, illnesses, injuries, tests, procedures, treatments, therapies, discharge instructions or life-style choices that may apply to you. You must talk with your health care provider for complete information about your health and treatment options. This information should not be used to decide whether or not to accept your health care providers advice, instructions or recommendations. Only your health care provider has the knowledge and training to provide advice that is right for you.  Copyright   Copyright © 2021 [employer] and its affiliates and/or licensors. All rights reserved.

## 2023-10-06 NOTE — PROGRESS NOTES
"Subjective:      Patient ID: Tova Muñoz is a 58 y.o. female.    Vitals:  height is 5' 1" (1.549 m) and weight is 79.4 kg (175 lb 0.7 oz). Her temperature is 97.6 °F (36.4 °C). Her blood pressure is 136/63 and her pulse is 72. Her respiration is 18 and oxygen saturation is 98%.     Chief Complaint: Urinary Tract Infection (Started on MOnday)    Patient presents today with possible UTI since Monday. She states that she has had recurrent UTI in the past but none in the last 6 years.  Never had any resistant bacteria.  Has been using Cystex for the last couple of days which has been relieving her symptoms    Urinary Tract Infection   This is a new problem. The current episode started in the past 7 days. The problem occurs every urination. The problem has been unchanged. The quality of the pain is described as aching and burning. The pain is at a severity of 4/10. The pain is moderate. There has been no fever. Associated symptoms include frequency and urgency. Pertinent negatives include no flank pain. Treatments tried: cystex. The treatment provided moderate relief. Her past medical history is significant for recurrent UTIs.       Constitution: Negative for fatigue and fever.   Genitourinary:  Positive for dysuria, frequency and urgency. Negative for urine decreased, flank pain and bladder incontinence.      Objective:     Physical Exam   Constitutional: She is oriented to person, place, and time. She does not appear ill. No distress.   HENT:   Head: Normocephalic and atraumatic.   Mouth/Throat: Mucous membranes are moist.   Pulmonary/Chest: Effort normal.   Abdominal: Normal appearance. She exhibits no distension. Soft. There is no abdominal tenderness. There is no left CVA tenderness and no right CVA tenderness.   Neurological: She is alert and oriented to person, place, and time.   Skin: Skin is warm and dry.   Psychiatric: Her behavior is normal.   Vitals reviewed.      Assessment:     1. Frequency of urination "      Results for orders placed or performed in visit on 10/06/23   POCT Urinalysis, Dipstick, Automated, W/O Scope   Result Value Ref Range    POC Blood, Urine Negative Negative    POC Bilirubin, Urine Negative Negative    POC Urobilinogen, Urine normal 0.1 - 1.1    POC Ketones, Urine Negative Negative    POC Protein, Urine Negative Negative    POC Nitrates, Urine Negative Negative    POC Glucose, Urine Negative Negative    pH, UA 6.5     POC Specific Gravity, Urine 1.010 1.003 - 1.029    POC Leukocytes, Urine Negative Negative       Plan:       Frequency of urination  -     POCT Urinalysis, Dipstick, Automated, W/O Scope  -     nitrofurantoin, macrocrystal-monohydrate, (MACROBID) 100 MG capsule; Take 1 capsule (100 mg total) by mouth 2 (two) times daily. for 5 days  Dispense: 10 capsule; Refill: 0          Medical Decision Making:   Initial Assessment:   Dysuria  Differential Diagnosis:   Cystitis, vaginitis, HSV  Clinical Tests:   Lab Tests: Ordered and Reviewed       <> Summary of Lab: Urinalysis came back negative  Urgent Care Management:  Despite the normal UA here, patient has significant symptoms that she associates with her UTIs from the past.  Starting her on Macrobid and doing a urine culture.  Patient advised not to take the Cystex more than 2 more days and we will reach out when culture results come to see how she is doing.  She verbalizes understanding of and agreement with this plan

## 2023-10-09 ENCOUNTER — TELEPHONE (OUTPATIENT)
Dept: URGENT CARE | Facility: CLINIC | Age: 58
End: 2023-10-09
Payer: OTHER GOVERNMENT

## 2023-10-09 LAB — BACTERIA UR CULT: ABNORMAL

## 2023-10-09 NOTE — TELEPHONE ENCOUNTER
Spoke with patient and she said she is still having pain when meds wear off. I advised to make appt with PCP to get more testing if persists.

## 2023-10-10 ENCOUNTER — TELEPHONE (OUTPATIENT)
Dept: URGENT CARE | Facility: CLINIC | Age: 58
End: 2023-10-10
Payer: OTHER GOVERNMENT

## 2023-10-10 NOTE — TELEPHONE ENCOUNTER
Discussed urine culture results with patient.  Patient states that symptoms have improved since yesterday but she is still having a slight dull ache and lower abdomen with urination.  She states that she has 2 doses of nitrofurantoin left. Advised to finish the abx, push fluids, and if any symptoms still present at that point give us a call or rtc.

## 2023-10-26 ENCOUNTER — OFFICE VISIT (OUTPATIENT)
Dept: PRIMARY CARE CLINIC | Facility: CLINIC | Age: 58
End: 2023-10-26
Payer: OTHER GOVERNMENT

## 2023-10-26 VITALS
TEMPERATURE: 99 F | BODY MASS INDEX: 32.55 KG/M2 | WEIGHT: 172.38 LBS | DIASTOLIC BLOOD PRESSURE: 72 MMHG | SYSTOLIC BLOOD PRESSURE: 130 MMHG | OXYGEN SATURATION: 98 % | HEART RATE: 80 BPM | HEIGHT: 61 IN

## 2023-10-26 DIAGNOSIS — E66.9 CLASS 1 OBESITY WITH BODY MASS INDEX (BMI) OF 32.0 TO 32.9 IN ADULT, UNSPECIFIED OBESITY TYPE, UNSPECIFIED WHETHER SERIOUS COMORBIDITY PRESENT: ICD-10-CM

## 2023-10-26 DIAGNOSIS — R03.0 ELEVATED BP WITHOUT DIAGNOSIS OF HYPERTENSION: ICD-10-CM

## 2023-10-26 DIAGNOSIS — F41.1 GAD (GENERALIZED ANXIETY DISORDER): ICD-10-CM

## 2023-10-26 DIAGNOSIS — E66.09 CLASS 1 OBESITY DUE TO EXCESS CALORIES WITH SERIOUS COMORBIDITY AND BODY MASS INDEX (BMI) OF 32.0 TO 32.9 IN ADULT: ICD-10-CM

## 2023-10-26 PROBLEM — E66.811 CLASS 1 OBESITY DUE TO EXCESS CALORIES WITH SERIOUS COMORBIDITY AND BODY MASS INDEX (BMI) OF 32.0 TO 32.9 IN ADULT: Status: ACTIVE | Noted: 2017-11-08

## 2023-10-26 PROCEDURE — 99213 OFFICE O/P EST LOW 20 MIN: CPT | Mod: PBBFAC | Performed by: FAMILY MEDICINE

## 2023-10-26 PROCEDURE — 99999 PR PBB SHADOW E&M-EST. PATIENT-LVL III: CPT | Mod: PBBFAC,,, | Performed by: FAMILY MEDICINE

## 2023-10-26 PROCEDURE — 99214 OFFICE O/P EST MOD 30 MIN: CPT | Mod: S$PBB,,, | Performed by: FAMILY MEDICINE

## 2023-10-26 PROCEDURE — 99999 PR PBB SHADOW E&M-EST. PATIENT-LVL III: ICD-10-PCS | Mod: PBBFAC,,, | Performed by: FAMILY MEDICINE

## 2023-10-26 PROCEDURE — 99214 PR OFFICE/OUTPT VISIT, EST, LEVL IV, 30-39 MIN: ICD-10-PCS | Mod: S$PBB,,, | Performed by: FAMILY MEDICINE

## 2023-10-26 RX ORDER — PHENTERMINE AND TOPIRAMATE 7.5; 46 MG/1; MG/1
1 CAPSULE, EXTENDED RELEASE ORAL DAILY
Qty: 30 CAPSULE | Refills: 1 | Status: SHIPPED | OUTPATIENT
Start: 2023-10-26 | End: 2023-12-22 | Stop reason: SDUPTHER

## 2023-10-26 NOTE — PROGRESS NOTES
Subjective     Patient ID: Tova Muñoz is a 58 y.o. female.    Chief Complaint: follow up    Pt has been on qsymia. Reports overall doing well. She has lost approximately 20 lb. She feels much happier and better overall. It decreases food noise and cravings. She feels like is more easily satisfied. Getting water but could do better. Also, doing well with nutrition: taking protein in. Likes sandwiches. Feels good with small amounts. She has not yet spoken with dietitian    No change in MH. Denies any neg se's from the topamax. No significant forgetfulness. No rash.   No hx of glaucoma or renal stones. Does not drink carbonated beverage anymore except rare ginger ale. NO paresthesias.     BP has been okay.     She had a thrombosed ext hemorrhoid; had procedure with surgery and has done well.     Many travels over the summer. Has had uti, sinus infection, broken finger. This has all healed.      Pt has not been on any medication for the cholesterol.    HPI       Objective     PAST MEDICAL HISTORY:  Past Medical History:   Diagnosis Date    Allergy     BCC (basal cell carcinoma of skin)     nose    Colon polyp     Migraine headache     Sinusitis          PAST SURGICAL HISTORY:  Past Surgical History:   Procedure Laterality Date    AUGMENTATION OF BREAST          BAND HEMORRHOIDECTOMY      BASAL CELL CARCINOMA EXCISION  2015    breast augumentation      BREAST SURGERY Bilateral     saline implants     SECTION      x2    COLONOSCOPY N/A 2016    Procedure: COLONOSCOPY;  Surgeon: Dali Rodriguez MD;  Location: [facility] ENDO;  Service: Endoscopy;  Laterality: N/A;    COLONOSCOPY N/A 08/15/2022    Procedure: COLONOSCOPY;  Surgeon: Shayy William MD;  Location: [facility] ENDO;  Service: Endoscopy;  Laterality: N/A;    EYE SURGERY      Lasik    LASIX      SKIN BIOPSY      TUBAL LIGATION         FAMILY HISTORY:  Family History   Problem Relation Age of Onset    Asthma Mother     Diabetes Mother      Emphysema Mother     Hypertension Mother     Basal cell carcinoma Mother     Melanoma Mother     Arthritis Mother     Cancer Mother         Basal cell and a melanoma    COPD Mother     Asthma Sister     Cancer Sister         Basal Cell    Basal cell carcinoma Sister     Vulvar Cancer Sister     Heart disease Father 75        CHF    Cancer Father         Lung Cancer    COPD Father     Cancer Paternal Grandmother         Uterine    Ovarian cancer Paternal Grandmother     Diabetes Maternal Grandfather     Arthritis Maternal Grandmother     Miscarriages / Stillbirths Maternal Grandmother     Arthritis Maternal Aunt     Diabetes Maternal Aunt     Cancer Sister         Vulvar Cancer (Squamous Cell)    Depression Sister     Diabetes Maternal Uncle     Eczema Neg Hx     Lupus Neg Hx     Psoriasis Neg Hx           SOCIAL HISTORY:  Social History     Social History Narrative    Not on file       MEDICATIONS:  Medications have been reviewed.    ALLERGIES:  Allergies have been reviewed.    Vitals:    10/26/23 1423   BP: 130/72   Pulse: 80   Temp: 98.6 °F (37 °C)     Wt Readings from Last 10 Encounters:   10/26/23 78.2 kg (172 lb 6.4 oz)   10/06/23 79.4 kg (175 lb 0.7 oz)   08/17/23 81.1 kg (178 lb 12.7 oz)   08/10/23 82.6 kg (182 lb)   07/27/23 82.9 kg (182 lb 12.2 oz)   07/27/23 83.1 kg (183 lb 3.2 oz)   06/29/23 84.5 kg (186 lb 4.6 oz)   05/25/23 86.8 kg (191 lb 5.8 oz)   04/10/23 86.5 kg (190 lb 11.2 oz)   03/01/23 87.8 kg (193 lb 7.3 oz)       Lab Results   Component Value Date    WBC 5.19 07/27/2023    HGB 12.2 07/27/2023    HCT 37.1 07/27/2023     07/27/2023    CHOL 275 (H) 03/02/2023    TRIG 95 03/02/2023    HDL 68 03/02/2023    ALT 26 03/02/2023    AST 24 03/02/2023     03/02/2023    K 4.5 03/02/2023     03/02/2023    CREATININE 0.9 03/02/2023    BUN 22 (H) 03/02/2023    CO2 27 03/02/2023    TSH 2.680 03/02/2023    HGBA1C 5.5 03/02/2023       Review of Systems   Constitutional:  Negative for activity  change, appetite change, fatigue and fever.   HENT:  Negative for mouth dryness and goiter.    Eyes:  Negative for visual disturbance.   Respiratory:  Negative for apnea, cough, chest tightness and shortness of breath.    Cardiovascular:  Negative for chest pain, palpitations and leg swelling.   Gastrointestinal:  Negative for abdominal pain, constipation, diarrhea, nausea, vomiting and reflux.   Endocrine: Negative for cold intolerance, heat intolerance, polydipsia, polyphagia and polyuria.   Genitourinary:  Negative for frequency and menstrual problem.   Musculoskeletal:  Negative for arthralgias and myalgias.   Integumentary:  Negative for color change and rash.   Neurological:  Negative for dizziness, vertigo, tremors, seizures, syncope, facial asymmetry, weakness, headaches and memory loss.   Psychiatric/Behavioral:  Negative for self-injury, sleep disturbance and suicidal ideas. The patient is not nervous/anxious.        Physical Exam  Vitals and nursing note reviewed.   Constitutional:       General: She is not in acute distress.  HENT:      Head: Normocephalic and atraumatic.      Mouth/Throat:      Pharynx: Oropharynx is clear.   Eyes:      General: No scleral icterus.     Pupils: Pupils are equal, round, and reactive to light.   Neck:      Comments: No TM  Cardiovascular:      Rate and Rhythm: Normal rate and regular rhythm.      Pulses: Normal pulses.      Heart sounds: Normal heart sounds. No murmur heard.     No friction rub. No gallop.   Pulmonary:      Effort: Pulmonary effort is normal. No respiratory distress.      Breath sounds: Normal breath sounds. No wheezing, rhonchi or rales.   Abdominal:      General: Bowel sounds are normal. There is no distension.      Palpations: Abdomen is soft.      Tenderness: There is no abdominal tenderness.   Musculoskeletal:         General: No swelling.      Cervical back: Normal range of motion and neck supple. No tenderness.   Lymphadenopathy:      Cervical: No  cervical adenopathy.   Skin:     General: Skin is warm.      Capillary Refill: Capillary refill takes less than 2 seconds.      Findings: No erythema or rash.   Neurological:      Mental Status: She is alert and oriented to person, place, and time.   Psychiatric:         Mood and Affect: Mood normal.         Behavior: Behavior normal.              Assessment and Plan     ICD-10-CM ICD-9-CM   1. Elevated BP without diagnosis of hypertension  R03.0 796.2   2. NIKOLAS (generalized anxiety disorder)  F41.1 300.02   3. Class 1 obesity due to excess calories with serious comorbidity and body mass index (BMI) of 32.0 to 32.9 in adult  E66.09 278.00    Z68.32 V85.32   4. Class 1 obesity with body mass index (BMI) of 32.0 to 32.9 in adult, unspecified obesity type, unspecified whether serious comorbidity present  E66.9 278.00    Z68.32 V85.32          Elevated BP without diagnosis of hypertension  Comments:  chronic/stable; bp today good      NIKOLAS (generalized anxiety disorder)  Comments:  chronic/stable continue celexa    Class 1 obesity due to excess calories with serious comorbidity and body mass index (BMI) of 32.0 to 32.9 in adult  Comments:  pt has lost > 10% tbw in 5 months; doing well; feels wellso will continue qysmia  Orders:  -     phentermine-topiramate (QSYMIA) 7.5-46 mg CM24; Take 1 capsule by mouth once daily.  Dispense: 30 capsule; Refill: 1      reviewed  Reviewed with pt risks/benefits/se's     Follow up in about 8 weeks (around 12/21/2023), or if symptoms worsen or fail to improve.

## 2023-11-16 ENCOUNTER — NUTRITION (OUTPATIENT)
Dept: INTERNAL MEDICINE | Facility: CLINIC | Age: 58
End: 2023-11-16
Payer: OTHER GOVERNMENT

## 2023-11-16 DIAGNOSIS — Z83.3 FAMILY HISTORY OF DIABETES MELLITUS (DM): ICD-10-CM

## 2023-11-16 DIAGNOSIS — E66.09 CLASS 1 OBESITY DUE TO EXCESS CALORIES WITH SERIOUS COMORBIDITY AND BODY MASS INDEX (BMI) OF 32.0 TO 32.9 IN ADULT: ICD-10-CM

## 2023-11-16 DIAGNOSIS — R03.0 ELEVATED BP WITHOUT DIAGNOSIS OF HYPERTENSION: ICD-10-CM

## 2023-11-16 DIAGNOSIS — Z71.3 DIETARY COUNSELING: Primary | ICD-10-CM

## 2023-11-16 PROCEDURE — 97802 MEDICAL NUTRITION INDIV IN: CPT | Mod: S$GLB,,, | Performed by: DIETITIAN, REGISTERED

## 2023-11-16 PROCEDURE — 97802 PR MED NUTR THER, 1ST, INDIV, EA 15 MIN: ICD-10-PCS | Mod: S$GLB,,, | Performed by: DIETITIAN, REGISTERED

## 2023-11-16 NOTE — PROGRESS NOTES
"Nutrition Assessment  Session Time:  60 minutes      Client name:  Tova Muñoz  :  1965  Age:  58 y.o.  Gender:  female    Client states:  Referred by Sharon Cruz MD.      Reports wanting to lose weight.   Has a goal to decrease another 20-25 lbs.    Starting in May, patient began taking Qsymia  and was able to lose approximately 20 lbs.  Stalling at current weight for 3 weeks now.   Has achieved weight loss in the past and could never drop below 170 lbs.       Unable to exercise now due to lack of time. Reports working long hours and once she gets home she doesn't not have the time/energy to exercise.  Every other weekends has her 2 grandchildren (ages 2 and 4) until her son gets home from the [employer].   Other weekends will tend to errands and stuff at house.     Sample intake:  Breakfast: yogurt (regular)// Kind bar// Bob bar// berries or other fresh fruits// weekends: scrambled eggs + cheese  Lunch: sandwich or salad picked up from restaurant// spaghetti + meatballs// leftovers  Home late. Doesn't want to eat after 7pm so eats a small snack instead of dinner.  Snack: yogurt// kind bar// banana// bowl of cereal     Drinks: coffee (black or sometimes [employer] Vanilla Latte for breakfast with half amount of syrup), water, half and half tea (occasionally), orange juice once monthly   Alcohol: 95 calorie low carb beer, can barely finish 1// 1-2x/month     No sodas since starting Qsymia. Prior to was drinking 1-2 per week.   No longer has craving for sweets. Typically loves carbohydrates. If wanting a candy now will only get one.   1-2 times per week will have a sweet.       Anthropometrics  Height:  61"     Weight:  172.04 lbs  BMI:  32.6  % Body Fat:  n/a     RECENT WEIGHTS      Weight (lb) Weight (kg) BMI (Calculated)   3/1/2023 193.45  87.75  36.6    4/10/2023 190.7  86.5  36.1    2023 191.36  86.8  36.2    2023 186.29  84.5  35.2    2023 182.76  82.9  34.6     183.2  83.1  34.6  "   8/10/2023 182  82.555  34.4    2023 178.79  81.1     10/6/2023 175.05  79.4  33.1    10/26/2023 172.4  78.2  32.6          Clinical Signs/Symptoms  N/V/D:  none noted  Appetite:  good       Past Medical History:   Diagnosis Date    Allergy     BCC (basal cell carcinoma of skin)     nose    Colon polyp     Migraine headache     Sinusitis        Past Surgical History:   Procedure Laterality Date    AUGMENTATION OF BREAST          BAND HEMORRHOIDECTOMY      BASAL CELL CARCINOMA EXCISION  2015    breast augumentation      BREAST SURGERY Bilateral 1989    saline implants     SECTION      x2    COLONOSCOPY N/A 2016    Procedure: COLONOSCOPY;  Surgeon: Dali Rodriguez MD;  Location: [facility] ENDO;  Service: Endoscopy;  Laterality: N/A;    COLONOSCOPY N/A 08/15/2022    Procedure: COLONOSCOPY;  Surgeon: Shayy William MD;  Location: [facility] ENDO;  Service: Endoscopy;  Laterality: N/A;    EYE SURGERY      Lasik    LASIX      SKIN BIOPSY      TUBAL LIGATION         Medications    has a current medication list which includes the following prescription(s): citalopram, clobetasol 0.05%, hydrocortisone-pramoxine, levocetirizine, multivit-min/folic acid/biotin, qsymia, and treximet.    Vitamins, Minerals, and/or Supplements:  not discussed     Food/Medication Interactions:  Reviewed     Food Allergies or Intolerances:  NKFA noted in chart     Social History    Marital status:    Employment:  yes    Social History     Tobacco Use    Smoking status: Never    Smokeless tobacco: Never   Substance Use Topics    Alcohol use: Yes     Alcohol/week: 2.0 - 3.0 standard drinks of alcohol     Types: 2 - 3 Cans of beer per week        Lab Reports   Sodium   Date Value Ref Range Status   2023 139 136 - 145 mmol/L Final     Potassium   Date Value Ref Range Status   2023 4.5 3.5 - 5.1 mmol/L Final     Chloride   Date Value Ref Range Status   2023 107 95 - 110 mmol/L Final     CO2   Date Value Ref  Range Status   03/02/2023 27 23 - 29 mmol/L Final     Glucose   Date Value Ref Range Status   03/02/2023 86 70 - 110 mg/dL Final     BUN   Date Value Ref Range Status   03/02/2023 22 (H) 6 - 20 mg/dL Final     Creatinine   Date Value Ref Range Status   03/02/2023 0.9 0.5 - 1.4 mg/dL Final     Calcium   Date Value Ref Range Status   03/02/2023 9.3 8.7 - 10.5 mg/dL Final     Total Protein   Date Value Ref Range Status   03/02/2023 7.5 6.0 - 8.4 g/dL Final     Albumin   Date Value Ref Range Status   03/02/2023 4.2 3.5 - 5.2 g/dL Final     Total Bilirubin   Date Value Ref Range Status   03/02/2023 0.4 0.1 - 1.0 mg/dL Final     Comment:     For infants and newborns, interpretation of results should be based  on gestational age, weight and in agreement with clinical  observations.    Premature Infant recommended reference ranges:  Up to 24 hours.............<8.0 mg/dL  Up to 48 hours............<12.0 mg/dL  3-5 days..................<15.0 mg/dL  6-29 days.................<15.0 mg/dL       Alkaline Phosphatase   Date Value Ref Range Status   03/02/2023 73 55 - 135 U/L Final     AST   Date Value Ref Range Status   03/02/2023 24 10 - 40 U/L Final     ALT   Date Value Ref Range Status   03/02/2023 26 10 - 44 U/L Final     Anion Gap   Date Value Ref Range Status   03/02/2023 5 (L) 8 - 16 mmol/L Final     eGFR if    Date Value Ref Range Status   02/10/2022 >60.0 >60 mL/min/1.73 m^2 Final     eGFR if non    Date Value Ref Range Status   02/10/2022 >60.0 >60 mL/min/1.73 m^2 Final     Comment:     Calculation used to obtain the estimated glomerular filtration  rate (eGFR) is the CKD-EPI equation.         Lab Results   Component Value Date    WBC 5.19 07/27/2023    HGB 12.2 07/27/2023    HCT 37.1 07/27/2023     (H) 07/27/2023     07/27/2023        Lab Results   Component Value Date    CHOL 275 (H) 03/02/2023     Lab Results   Component Value Date    HDL 68 03/02/2023     Lab Results    Component Value Date    LDLCALC 188.0 (H) 03/02/2023     Lab Results   Component Value Date    TRIG 95 03/02/2023     Lab Results   Component Value Date    CHOLHDL 24.7 03/02/2023     Lab Results   Component Value Date    HGBA1C 5.5 03/02/2023     BP Readings from Last 1 Encounters:   10/26/23 130/72       Food History  Provided above    Exercise History:  provided above    Cultural/Spiritual/Personal Preferences:  None identified    Support System:  spouse    State of Change:  Preparation    Barriers to Change:  none    Diagnosis    Food and nutrition related knowledge deficit related to no prior nutrition education as evidenced by seeking guidance on weight loss.    Intervention    RMR (Method:  [place] Jeor):  1298 kcal  Activity Factor:  1.2    IAN:  1557 - 250 = 1307 kcal    Goals:  1.  Aim for 80 grams of protein daily  2.  Increase daily fluid intake  3.  Incorporate extra movement in daily activities   4. Choose sugar free syrup in morning coffee or make coffee at home consisting of coffee + flavored protein powder.     Nutrition Education  The following education was provided to the patient:  Discussed weight management.  Suggested dietary modifications based on current dietary behaviors and individual food preferences.    Patient verbalized understanding of nutrition education and recommendations received.    Handouts Provided  Eat Fit Shopping List      Monitoring/Evaluation    Monitor the following:  Weight  BMI  Caloric intake  Labs:      Follow Up Plan:  as needed

## 2023-12-22 ENCOUNTER — OFFICE VISIT (OUTPATIENT)
Dept: PRIMARY CARE CLINIC | Facility: CLINIC | Age: 58
End: 2023-12-22
Payer: OTHER GOVERNMENT

## 2023-12-22 VITALS
WEIGHT: 170 LBS | DIASTOLIC BLOOD PRESSURE: 84 MMHG | SYSTOLIC BLOOD PRESSURE: 120 MMHG | OXYGEN SATURATION: 95 % | HEIGHT: 61 IN | TEMPERATURE: 98 F | BODY MASS INDEX: 32.1 KG/M2 | HEART RATE: 72 BPM

## 2023-12-22 DIAGNOSIS — R03.0 BLOOD PRESSURE ELEVATED WITHOUT HISTORY OF HTN: ICD-10-CM

## 2023-12-22 DIAGNOSIS — E66.09 CLASS 1 OBESITY DUE TO EXCESS CALORIES WITH SERIOUS COMORBIDITY AND BODY MASS INDEX (BMI) OF 32.0 TO 32.9 IN ADULT: ICD-10-CM

## 2023-12-22 DIAGNOSIS — F41.1 GAD (GENERALIZED ANXIETY DISORDER): ICD-10-CM

## 2023-12-22 DIAGNOSIS — Z83.3 FAMILY HISTORY OF DIABETES MELLITUS (DM): ICD-10-CM

## 2023-12-22 PROCEDURE — 99999 PR PBB SHADOW E&M-EST. PATIENT-LVL III: ICD-10-PCS | Mod: PBBFAC,,, | Performed by: FAMILY MEDICINE

## 2023-12-22 PROCEDURE — 99213 OFFICE O/P EST LOW 20 MIN: CPT | Mod: PBBFAC | Performed by: FAMILY MEDICINE

## 2023-12-22 PROCEDURE — 99214 PR OFFICE/OUTPT VISIT, EST, LEVL IV, 30-39 MIN: ICD-10-PCS | Mod: S$PBB,,, | Performed by: FAMILY MEDICINE

## 2023-12-22 PROCEDURE — 99214 OFFICE O/P EST MOD 30 MIN: CPT | Mod: S$PBB,,, | Performed by: FAMILY MEDICINE

## 2023-12-22 PROCEDURE — 99999 PR PBB SHADOW E&M-EST. PATIENT-LVL III: CPT | Mod: PBBFAC,,, | Performed by: FAMILY MEDICINE

## 2023-12-22 RX ORDER — PHENTERMINE AND TOPIRAMATE 7.5; 46 MG/1; MG/1
1 CAPSULE, EXTENDED RELEASE ORAL DAILY
Qty: 30 CAPSULE | Refills: 1 | Status: SHIPPED | OUTPATIENT
Start: 2023-12-22 | End: 2024-01-21

## 2023-12-22 NOTE — PROGRESS NOTES
Subjective     Patient ID: Tova Muñoz is a 58 y.o. female.    Chief Complaint: follow up    Pt on qsymia. Tolerating. Overall > 10% tbw loss.   BP has been stable/improved. Pt reports no cp/sob. Some stressors. No hx of CAD.    Pt states does have change in taste of carbonated drinks. No paresthesias.     Her weight is down 20 lb. She was able to speak with dietitian. Has not been able to fully implement the changes (busy at work and holidays). She is working on increasing water and protein consumption.    Pt plans to do one more month of current dosage and really work on dietary interventions.           HPI       Objective     PAST MEDICAL HISTORY:  Past Medical History:   Diagnosis Date    Allergy     BCC (basal cell carcinoma of skin)     nose    Colon polyp     Migraine headache     Sinusitis          PAST SURGICAL HISTORY:  Past Surgical History:   Procedure Laterality Date    AUGMENTATION OF BREAST          BAND HEMORRHOIDECTOMY      BASAL CELL CARCINOMA EXCISION  2015    breast augumentation      BREAST SURGERY Bilateral 1989    saline implants     SECTION      x2    COLONOSCOPY N/A 2016    Procedure: COLONOSCOPY;  Surgeon: Dali Rodriguez MD;  Location: [facility] ENDO;  Service: Endoscopy;  Laterality: N/A;    COLONOSCOPY N/A 08/15/2022    Procedure: COLONOSCOPY;  Surgeon: Shayy William MD;  Location: [facility] ENDO;  Service: Endoscopy;  Laterality: N/A;    EYE SURGERY      Lasik    LASIX      SKIN BIOPSY      TUBAL LIGATION         FAMILY HISTORY:  Family History   Problem Relation Age of Onset    Asthma Mother     Diabetes Mother     Emphysema Mother     Hypertension Mother     Basal cell carcinoma Mother     Melanoma Mother     Arthritis Mother     Cancer Mother         Basal cell and a melanoma    COPD Mother     Asthma Sister     Cancer Sister         Basal Cell    Basal cell carcinoma Sister     Vulvar Cancer Sister     Heart disease Father 75        CHF    Cancer Father          Lung Cancer    COPD Father     Cancer Paternal Grandmother         Uterine    Ovarian cancer Paternal Grandmother     Diabetes Maternal Grandfather     Arthritis Maternal Grandmother     Miscarriages / Stillbirths Maternal Grandmother     Arthritis Maternal Aunt     Diabetes Maternal Aunt     Cancer Sister         Vulvar Cancer (Squamous Cell)    Depression Sister     Diabetes Maternal Uncle     Eczema Neg Hx     Lupus Neg Hx     Psoriasis Neg Hx           SOCIAL HISTORY:  Social History     Social History Narrative    Not on file       MEDICATIONS:  Medications have been reviewed.    ALLERGIES:  Allergies have been reviewed.    Vitals:    12/22/23 0814   BP: 120/84   Pulse: 72   Temp: 97.7 °F (36.5 °C)     Wt Readings from Last 10 Encounters:   12/22/23 77.1 kg (169 lb 15.6 oz)   10/26/23 78.2 kg (172 lb 6.4 oz)   10/06/23 79.4 kg (175 lb 0.7 oz)   08/17/23 81.1 kg (178 lb 12.7 oz)   08/10/23 82.6 kg (182 lb)   07/27/23 82.9 kg (182 lb 12.2 oz)   07/27/23 83.1 kg (183 lb 3.2 oz)   06/29/23 84.5 kg (186 lb 4.6 oz)   05/25/23 86.8 kg (191 lb 5.8 oz)   04/10/23 86.5 kg (190 lb 11.2 oz)       Lab Results   Component Value Date    WBC 5.19 07/27/2023    HGB 12.2 07/27/2023    HCT 37.1 07/27/2023     07/27/2023    CHOL 275 (H) 03/02/2023    TRIG 95 03/02/2023    HDL 68 03/02/2023    ALT 26 03/02/2023    AST 24 03/02/2023     03/02/2023    K 4.5 03/02/2023     03/02/2023    CREATININE 0.9 03/02/2023    BUN 22 (H) 03/02/2023    CO2 27 03/02/2023    TSH 2.680 03/02/2023    HGBA1C 5.5 03/02/2023       Review of Systems   Constitutional:  Negative for activity change, appetite change, fatigue and fever.   HENT:  Negative for mouth dryness and goiter.    Eyes:  Negative for visual disturbance.   Respiratory:  Negative for apnea, cough, chest tightness and shortness of breath.    Cardiovascular:  Negative for chest pain, palpitations and leg swelling.   Gastrointestinal:  Negative for abdominal pain,  constipation, diarrhea, nausea, vomiting and reflux.   Endocrine: Negative for cold intolerance, heat intolerance, polydipsia, polyphagia and polyuria.   Genitourinary:  Negative for frequency and menstrual problem.   Musculoskeletal:  Negative for arthralgias and myalgias.   Integumentary:  Negative for color change and rash.   Neurological:  Negative for vertigo, tremors, seizures, syncope, weakness and numbness.   Psychiatric/Behavioral:  Negative for self-injury, sleep disturbance and suicidal ideas. The patient is not nervous/anxious.        Physical Exam  Vitals and nursing note reviewed.   Constitutional:       General: She is not in acute distress.  HENT:      Head: Normocephalic and atraumatic.      Mouth/Throat:      Pharynx: Oropharynx is clear.   Eyes:      General: No scleral icterus.     Pupils: Pupils are equal, round, and reactive to light.   Neck:      Comments: No TM  Cardiovascular:      Rate and Rhythm: Normal rate and regular rhythm.      Pulses: Normal pulses.      Heart sounds: Normal heart sounds. No murmur heard.     No friction rub. No gallop.   Pulmonary:      Effort: Pulmonary effort is normal. No respiratory distress.      Breath sounds: Normal breath sounds. No wheezing, rhonchi or rales.   Abdominal:      General: Bowel sounds are normal. There is no distension.      Palpations: Abdomen is soft.      Tenderness: There is no abdominal tenderness.   Musculoskeletal:         General: No swelling.      Cervical back: Normal range of motion and neck supple. No tenderness.   Lymphadenopathy:      Cervical: No cervical adenopathy.   Skin:     General: Skin is warm.      Capillary Refill: Capillary refill takes less than 2 seconds.      Findings: No erythema or rash.   Neurological:      Mental Status: She is alert and oriented to person, place, and time.   Psychiatric:         Mood and Affect: Mood normal.         Behavior: Behavior normal.              Assessment and Plan     1. Family  history of diabetes mellitus (DM)    2. Class 1 obesity due to excess calories with serious comorbidity and body mass index (BMI) of 32.0 to 32.9 in adult  Comments:  pt has lost > 10% tbw in 5 months; doing well; feels wellso will continue qysmia  Orders:  -     phentermine-topiramate (QSYMIA) 7.5-46 mg CM24; Take 1 capsule by mouth once daily.  Dispense: 30 capsule; Refill: 1    3. NIKOLAS (generalized anxiety disorder)  Comments:  chronic/stable; on celexa    4. Blood pressure elevated without history of HTN  Comments:  reading good today; pt to continue to monitor        ICD-10-CM ICD-9-CM   1. Family history of diabetes mellitus (DM)  Z83.3 V18.0   2. Class 1 obesity due to excess calories with serious comorbidity and body mass index (BMI) of 32.0 to 32.9 in adult  E66.09 278.00    Z68.32 V85.32   3. NIKOLAS (generalized anxiety disorder)  F41.1 300.02   4. Blood pressure elevated without history of HTN  R03.0 796.2      Family history of diabetes mellitus (DM)    Class 1 obesity due to excess calories with serious comorbidity and body mass index (BMI) of 32.0 to 32.9 in adult  Comments:  pt has lost > 10% tbw in 5 months; doing well; feels wellso will continue qysmia  Orders:  -     phentermine-topiramate (QSYMIA) 7.5-46 mg CM24; Take 1 capsule by mouth once daily.  Dispense: 30 capsule; Refill: 1    NIKOLAS (generalized anxiety disorder)  Comments:  chronic/stable; on celexa    Blood pressure elevated without history of HTN  Comments:  reading good today; pt to continue to monitor    Reviewed with pt risks/benefits/side effects of qysmia  Pt to check in and let me know how is doing; may escalate dosage        reviewed    Follow up in about 8 weeks (around 2/16/2024), or if symptoms worsen or fail to improve.

## 2024-01-27 ENCOUNTER — PATIENT MESSAGE (OUTPATIENT)
Dept: PRIMARY CARE CLINIC | Facility: CLINIC | Age: 59
End: 2024-01-27
Payer: OTHER GOVERNMENT

## 2024-01-27 DIAGNOSIS — E66.09 CLASS 1 OBESITY DUE TO EXCESS CALORIES WITH SERIOUS COMORBIDITY AND BODY MASS INDEX (BMI) OF 32.0 TO 32.9 IN ADULT: Primary | ICD-10-CM

## 2024-01-29 RX ORDER — PHENTERMINE AND TOPIRAMATE 11.25; 69 MG/1; MG/1
1 CAPSULE, EXTENDED RELEASE ORAL DAILY
Qty: 30 CAPSULE | Refills: 0 | Status: SHIPPED | OUTPATIENT
Start: 2024-01-29 | End: 2024-02-08

## 2024-01-29 NOTE — TELEPHONE ENCOUNTER
Wt Readings from Last 3 Encounters:   12/22/23 0814 77.1 kg (169 lb 15.6 oz)   10/26/23 1423 78.2 kg (172 lb 6.4 oz)   10/06/23 1631 79.4 kg (175 lb 0.7 oz)   Starting weight 191    Spoke with pt; she reports doing well but weight more at a plateau; no intolerance   reviewed  Called [facility]

## 2024-01-30 ENCOUNTER — TELEPHONE (OUTPATIENT)
Dept: PRIMARY CARE CLINIC | Facility: CLINIC | Age: 59
End: 2024-01-30
Payer: OTHER GOVERNMENT

## 2024-02-08 ENCOUNTER — OFFICE VISIT (OUTPATIENT)
Dept: URGENT CARE | Facility: CLINIC | Age: 59
End: 2024-02-08
Payer: OTHER GOVERNMENT

## 2024-02-08 VITALS
WEIGHT: 167.31 LBS | HEIGHT: 61 IN | DIASTOLIC BLOOD PRESSURE: 63 MMHG | TEMPERATURE: 98 F | HEART RATE: 82 BPM | OXYGEN SATURATION: 98 % | RESPIRATION RATE: 16 BRPM | SYSTOLIC BLOOD PRESSURE: 138 MMHG | BODY MASS INDEX: 31.59 KG/M2

## 2024-02-08 DIAGNOSIS — J02.9 SORE THROAT: ICD-10-CM

## 2024-02-08 DIAGNOSIS — J06.9 URI WITH COUGH AND CONGESTION: Primary | ICD-10-CM

## 2024-02-08 DIAGNOSIS — R05.1 ACUTE COUGH: ICD-10-CM

## 2024-02-08 DIAGNOSIS — R09.82 PND (POST-NASAL DRIP): ICD-10-CM

## 2024-02-08 LAB
CTP QC/QA: YES
CTP QC/QA: YES
POC MOLECULAR INFLUENZA A AGN: NEGATIVE
POC MOLECULAR INFLUENZA B AGN: NEGATIVE
SARS-COV-2 AG RESP QL IA.RAPID: NEGATIVE

## 2024-02-08 PROCEDURE — 87811 SARS-COV-2 COVID19 W/OPTIC: CPT | Mod: QW,S$GLB,, | Performed by: PHYSICIAN ASSISTANT

## 2024-02-08 PROCEDURE — 87502 INFLUENZA DNA AMP PROBE: CPT | Mod: QW,S$GLB,, | Performed by: PHYSICIAN ASSISTANT

## 2024-02-08 PROCEDURE — 99214 OFFICE O/P EST MOD 30 MIN: CPT | Mod: 25,S$GLB,, | Performed by: PHYSICIAN ASSISTANT

## 2024-02-08 PROCEDURE — 96372 THER/PROPH/DIAG INJ SC/IM: CPT | Mod: S$GLB,,, | Performed by: EMERGENCY MEDICINE

## 2024-02-08 RX ORDER — BENZONATATE 200 MG/1
200 CAPSULE ORAL 3 TIMES DAILY PRN
Qty: 30 CAPSULE | Refills: 0 | Status: SHIPPED | OUTPATIENT
Start: 2024-02-08 | End: 2024-02-18

## 2024-02-08 RX ORDER — DEXAMETHASONE SODIUM PHOSPHATE 100 MG/10ML
10 INJECTION INTRAMUSCULAR; INTRAVENOUS
Status: COMPLETED | OUTPATIENT
Start: 2024-02-08 | End: 2024-02-08

## 2024-02-08 RX ADMIN — DEXAMETHASONE SODIUM PHOSPHATE 10 MG: 100 INJECTION INTRAMUSCULAR; INTRAVENOUS at 09:02

## 2024-02-08 NOTE — PROGRESS NOTES
"Subjective:      Patient ID: Tova Muñoz is a 59 y.o. female.    Vitals:  height is 5' 1" (1.549 m) and weight is 75.9 kg (167 lb 5.3 oz). Her tympanic temperature is 97.8 °F (36.6 °C). Her blood pressure is 138/63 and her pulse is 82. Her respiration is 16 and oxygen saturation is 98%.     Chief Complaint: Cough    Pt presents to the clinic today with a dry cough, congestion, body aches, that all began on Tuesday. No fever or known sick contacts. Using Tylenol sinus with little relief.     Cough  This is a new problem. The current episode started in the past 7 days. The problem has been gradually worsening. The problem occurs every few minutes. The cough is Non-productive. Associated symptoms include chills, headaches, myalgias, nasal congestion and postnasal drip. Pertinent negatives include no chest pain, ear congestion, ear pain, fever, heartburn, hemoptysis, rash, rhinorrhea, sore throat, shortness of breath, sweats, weight loss or wheezing. Nothing aggravates the symptoms. Treatments tried: Tylenol sinus, Tylenol Arthritis. The treatment provided mild relief. There is no history of asthma, bronchiectasis, bronchitis, COPD, emphysema, environmental allergies or pneumonia.       Constitution: Positive for chills. Negative for fever.   HENT:  Positive for postnasal drip. Negative for ear pain and sore throat.    Cardiovascular:  Negative for chest pain.   Respiratory:  Positive for cough. Negative for bloody sputum, shortness of breath and wheezing.    Gastrointestinal:  Negative for heartburn.   Musculoskeletal:  Positive for muscle ache.   Skin:  Negative for rash.   Allergic/Immunologic: Negative for environmental allergies.   Neurological:  Positive for headaches.      Objective:     Physical Exam   Constitutional: She appears well-developed.  Non-toxic appearance. She does not appear ill. No distress.   HENT:   Head: Normocephalic and atraumatic.   Ears:   Right Ear: Tympanic membrane, external ear and " ear canal normal.   Left Ear: Tympanic membrane, external ear and ear canal normal.   Nose: Congestion present. Right sinus exhibits no maxillary sinus tenderness. Left sinus exhibits no maxillary sinus tenderness.   Mouth/Throat: Mucous membranes are moist. No oropharyngeal exudate. Oropharynx is clear.   Eyes: Conjunctivae and EOM are normal.   Neck: Neck supple.   Pulmonary/Chest: Effort normal and breath sounds normal.   Abdominal: Normal appearance.   Musculoskeletal: Normal range of motion.         General: Normal range of motion.   Lymphadenopathy:     She has no cervical adenopathy.   Neurological: no focal deficit. She is alert. She displays no weakness. Gait normal.   Skin: Skin is warm, dry, not diaphoretic, not pale and no rash.   Psychiatric: Her behavior is normal.     Results for orders placed or performed in visit on 02/08/24   SARS Coronavirus 2 Antigen, POCT Manual Read   Result Value Ref Range    SARS Coronavirus 2 Antigen Negative Negative     Acceptable Yes    POCT Influenza A/B Molecular   Result Value Ref Range    POC Molecular Influenza A Ag Negative Negative, Not Reported    POC Molecular Influenza B Ag Negative Negative, Not Reported     Acceptable Yes          Assessment:     1. URI with cough and congestion    2. Acute cough    3. PND (post-nasal drip)    4. Sore throat        Plan:     Patient requested steroid injection. Discussed risks & possible side effects of steroid injection. Pt verbalized understanding of risks associated with injection and wished to proceed with treatment.  Use tessalon perles as needed for cough. Discussed using flonase spray which she has at home.  Continue to monitor for fever.  Close follow-up if symptoms worsen or fail to improve.  URI with cough and congestion  -     dexAMETHasone injection 10 mg  -     benzonatate (TESSALON) 200 MG capsule; Take 1 capsule (200 mg total) by mouth 3 (three) times daily as needed for Cough.   Dispense: 30 capsule; Refill: 0    Acute cough  -     SARS Coronavirus 2 Antigen, POCT Manual Read  -     POCT Influenza A/B Molecular  -     dexAMETHasone injection 10 mg  -     benzonatate (TESSALON) 200 MG capsule; Take 1 capsule (200 mg total) by mouth 3 (three) times daily as needed for Cough.  Dispense: 30 capsule; Refill: 0    PND (post-nasal drip)  -     dexAMETHasone injection 10 mg    Sore throat  -     dexAMETHasone injection 10 mg

## 2024-02-29 ENCOUNTER — OFFICE VISIT (OUTPATIENT)
Dept: PRIMARY CARE CLINIC | Facility: CLINIC | Age: 59
End: 2024-02-29
Payer: OTHER GOVERNMENT

## 2024-02-29 ENCOUNTER — LAB VISIT (OUTPATIENT)
Dept: LAB | Facility: HOSPITAL | Age: 59
End: 2024-02-29
Attending: FAMILY MEDICINE
Payer: OTHER GOVERNMENT

## 2024-02-29 VITALS
BODY MASS INDEX: 32.1 KG/M2 | WEIGHT: 169.88 LBS | SYSTOLIC BLOOD PRESSURE: 120 MMHG | DIASTOLIC BLOOD PRESSURE: 84 MMHG | HEART RATE: 80 BPM | OXYGEN SATURATION: 98 % | TEMPERATURE: 98 F

## 2024-02-29 DIAGNOSIS — Z12.31 ENCOUNTER FOR SCREENING MAMMOGRAM FOR BREAST CANCER: ICD-10-CM

## 2024-02-29 DIAGNOSIS — Z00.00 ROUTINE GENERAL MEDICAL EXAMINATION AT A HEALTH CARE FACILITY: ICD-10-CM

## 2024-02-29 DIAGNOSIS — F41.8 MIXED ANXIETY DEPRESSIVE DISORDER: ICD-10-CM

## 2024-02-29 DIAGNOSIS — G43.809 OTHER MIGRAINE WITHOUT STATUS MIGRAINOSUS, NOT INTRACTABLE: ICD-10-CM

## 2024-02-29 DIAGNOSIS — Z00.00 ROUTINE GENERAL MEDICAL EXAMINATION AT A HEALTH CARE FACILITY: Primary | ICD-10-CM

## 2024-02-29 DIAGNOSIS — F41.1 GAD (GENERALIZED ANXIETY DISORDER): ICD-10-CM

## 2024-02-29 DIAGNOSIS — Z83.3 FAMILY HISTORY OF DIABETES MELLITUS (DM): ICD-10-CM

## 2024-02-29 DIAGNOSIS — E66.09 CLASS 1 OBESITY DUE TO EXCESS CALORIES WITH SERIOUS COMORBIDITY AND BODY MASS INDEX (BMI) OF 32.0 TO 32.9 IN ADULT: ICD-10-CM

## 2024-02-29 LAB
ALBUMIN SERPL BCP-MCNC: 4.2 G/DL (ref 3.5–5.2)
ALP SERPL-CCNC: 63 U/L (ref 55–135)
ALT SERPL W/O P-5'-P-CCNC: 15 U/L (ref 10–44)
ANION GAP SERPL CALC-SCNC: 8 MMOL/L (ref 8–16)
AST SERPL-CCNC: 22 U/L (ref 10–40)
BASOPHILS # BLD AUTO: 0.02 K/UL (ref 0–0.2)
BASOPHILS NFR BLD: 0.6 % (ref 0–1.9)
BILIRUB SERPL-MCNC: 0.3 MG/DL (ref 0.1–1)
BUN SERPL-MCNC: 22 MG/DL (ref 6–20)
CALCIUM SERPL-MCNC: 9.7 MG/DL (ref 8.7–10.5)
CHLORIDE SERPL-SCNC: 109 MMOL/L (ref 95–110)
CHOLEST SERPL-MCNC: 269 MG/DL (ref 120–199)
CHOLEST/HDLC SERPL: 4.1 {RATIO} (ref 2–5)
CO2 SERPL-SCNC: 23 MMOL/L (ref 23–29)
CREAT SERPL-MCNC: 0.8 MG/DL (ref 0.5–1.4)
DIFFERENTIAL METHOD BLD: ABNORMAL
EOSINOPHIL # BLD AUTO: 0.1 K/UL (ref 0–0.5)
EOSINOPHIL NFR BLD: 1.6 % (ref 0–8)
ERYTHROCYTE [DISTWIDTH] IN BLOOD BY AUTOMATED COUNT: 12.7 % (ref 11.5–14.5)
EST. GFR  (NO RACE VARIABLE): >60 ML/MIN/1.73 M^2
ESTIMATED AVG GLUCOSE: 111 MG/DL (ref 68–131)
GLUCOSE SERPL-MCNC: 90 MG/DL (ref 70–110)
HBA1C MFR BLD: 5.5 % (ref 4–5.6)
HCT VFR BLD AUTO: 39.7 % (ref 37–48.5)
HDLC SERPL-MCNC: 66 MG/DL (ref 40–75)
HDLC SERPL: 24.5 % (ref 20–50)
HGB BLD-MCNC: 12.8 G/DL (ref 12–16)
IMM GRANULOCYTES # BLD AUTO: 0.01 K/UL (ref 0–0.04)
IMM GRANULOCYTES NFR BLD AUTO: 0.3 % (ref 0–0.5)
LDLC SERPL CALC-MCNC: 187.8 MG/DL (ref 63–159)
LYMPHOCYTES # BLD AUTO: 1.1 K/UL (ref 1–4.8)
LYMPHOCYTES NFR BLD: 34.6 % (ref 18–48)
MCH RBC QN AUTO: 33.7 PG (ref 27–31)
MCHC RBC AUTO-ENTMCNC: 32.2 G/DL (ref 32–36)
MCV RBC AUTO: 105 FL (ref 82–98)
MONOCYTES # BLD AUTO: 0.3 K/UL (ref 0.3–1)
MONOCYTES NFR BLD: 8.9 % (ref 4–15)
NEUTROPHILS # BLD AUTO: 1.7 K/UL (ref 1.8–7.7)
NEUTROPHILS NFR BLD: 54 % (ref 38–73)
NONHDLC SERPL-MCNC: 203 MG/DL
NRBC BLD-RTO: 0 /100 WBC
PLATELET # BLD AUTO: 200 K/UL (ref 150–450)
PMV BLD AUTO: 10.2 FL (ref 9.2–12.9)
POTASSIUM SERPL-SCNC: 4.5 MMOL/L (ref 3.5–5.1)
PROT SERPL-MCNC: 7.5 G/DL (ref 6–8.4)
RBC # BLD AUTO: 3.8 M/UL (ref 4–5.4)
SODIUM SERPL-SCNC: 140 MMOL/L (ref 136–145)
T4 FREE SERPL-MCNC: 0.91 NG/DL (ref 0.71–1.51)
TRIGL SERPL-MCNC: 76 MG/DL (ref 30–150)
TSH SERPL DL<=0.005 MIU/L-ACNC: 2.1 UIU/ML (ref 0.4–4)
WBC # BLD AUTO: 3.15 K/UL (ref 3.9–12.7)

## 2024-02-29 PROCEDURE — 36415 COLL VENOUS BLD VENIPUNCTURE: CPT | Mod: PN | Performed by: FAMILY MEDICINE

## 2024-02-29 PROCEDURE — 85025 COMPLETE CBC W/AUTO DIFF WBC: CPT | Performed by: FAMILY MEDICINE

## 2024-02-29 PROCEDURE — 83525 ASSAY OF INSULIN: CPT | Performed by: FAMILY MEDICINE

## 2024-02-29 PROCEDURE — 80053 COMPREHEN METABOLIC PANEL: CPT | Performed by: FAMILY MEDICINE

## 2024-02-29 PROCEDURE — 84443 ASSAY THYROID STIM HORMONE: CPT | Performed by: FAMILY MEDICINE

## 2024-02-29 PROCEDURE — 99999 PR PBB SHADOW E&M-EST. PATIENT-LVL III: CPT | Mod: PBBFAC,,, | Performed by: FAMILY MEDICINE

## 2024-02-29 PROCEDURE — 99213 OFFICE O/P EST LOW 20 MIN: CPT | Mod: PBBFAC,PN | Performed by: FAMILY MEDICINE

## 2024-02-29 PROCEDURE — 99396 PREV VISIT EST AGE 40-64: CPT | Mod: S$PBB,,, | Performed by: FAMILY MEDICINE

## 2024-02-29 PROCEDURE — 83036 HEMOGLOBIN GLYCOSYLATED A1C: CPT | Performed by: FAMILY MEDICINE

## 2024-02-29 PROCEDURE — 80061 LIPID PANEL: CPT | Performed by: FAMILY MEDICINE

## 2024-02-29 PROCEDURE — 84439 ASSAY OF FREE THYROXINE: CPT | Performed by: FAMILY MEDICINE

## 2024-02-29 RX ORDER — RIZATRIPTAN BENZOATE 5 MG/1
5 TABLET, ORALLY DISINTEGRATING ORAL
Qty: 12 TABLET | Refills: 3 | Status: SHIPPED | OUTPATIENT
Start: 2024-02-29

## 2024-02-29 RX ORDER — NAPROXEN 500 MG/1
500 TABLET ORAL 2 TIMES DAILY PRN
Qty: 30 TABLET | Refills: 3 | Status: SHIPPED | OUTPATIENT
Start: 2024-02-29

## 2024-02-29 RX ORDER — CITALOPRAM 20 MG/1
20 TABLET, FILM COATED ORAL DAILY
Qty: 90 TABLET | Refills: 3 | Status: SHIPPED | OUTPATIENT
Start: 2024-02-29

## 2024-02-29 NOTE — PROGRESS NOTES
Subjective:      Patient ID: Tova Muñoz is a 59 y.o. female.    Chief Complaint: Annual Exam (Refill on Celexa 20 mg)      Patient is a 59 y.o. female coming in today for annual exam.   Has been f/u with Dr. Cruz for weight loss. Pt states she has been able to steadily lose weight since last visit. Has been trying to be more consistent with her diet, getting in more protein each day. Wants to get more active while being on medication. She is requiring medication refill today; medication list updated during visit. was on hair/skin/nails supplement but stopped more than 2 weeks ago. Informed pt about risks of Biotin for thyroid levels and TSH marker. Currently not following one specific diet; states she drinks protein shake daily. Reports normal bowel movements. No other health concern at this time.       1. Routine general medical examination at a health care facility    2. Encounter for screening mammogram for breast cancer    3. NIKOLAS (generalized anxiety disorder)    4. Family history of diabetes mellitus (DM)    5. Class 1 obesity due to excess calories with serious comorbidity and body mass index (BMI) of 32.0 to 32.9 in adult    6. Mixed anxiety depressive disorder    7. Other migraine without status migrainosus, not intractable       No questionnaires on file.    Pmh, Psh, Family Hx, Social Hx, HM updated in Epic Tabs today.   Review of Systems   Constitutional:  Positive for appetite change. Negative for activity change, chills, fatigue and fever.   HENT:  Negative for ear pain and trouble swallowing.    Eyes:  Negative for pain and visual disturbance.   Respiratory:  Negative for cough and shortness of breath.    Cardiovascular:  Negative for chest pain and leg swelling.   Gastrointestinal:  Negative for abdominal pain, blood in stool, nausea and vomiting.   Endocrine: Negative for cold intolerance and heat intolerance.   Genitourinary:  Negative for dysuria and frequency.   Musculoskeletal:  Negative  for joint swelling, myalgias and neck pain.   Skin:  Negative for color change and rash.   Neurological:  Negative for dizziness and headaches.   Psychiatric/Behavioral:  Negative for behavioral problems and sleep disturbance.      Objective:     Vitals:    02/29/24 0756   BP: 120/84   Pulse: 80   Temp: 97.6 °F (36.4 °C)   SpO2: 98%   Weight: 77 kg (169 lb 13.8 oz)     Wt Readings from Last 10 Encounters:   02/29/24 77 kg (169 lb 13.8 oz)   02/08/24 75.9 kg (167 lb 5.3 oz)   02/07/24 75.9 kg (167 lb 5.3 oz)   12/22/23 77.1 kg (169 lb 15.6 oz)   10/26/23 78.2 kg (172 lb 6.4 oz)   10/06/23 79.4 kg (175 lb 0.7 oz)   08/17/23 81.1 kg (178 lb 12.7 oz)   08/10/23 82.6 kg (182 lb)   07/27/23 82.9 kg (182 lb 12.2 oz)   07/27/23 83.1 kg (183 lb 3.2 oz)     Physical Exam  Vitals reviewed.   Constitutional:       Appearance: Normal appearance. She is well-developed. She is obese.   HENT:      Head: Normocephalic and atraumatic.      Right Ear: Tympanic membrane and external ear normal.      Left Ear: Tympanic membrane and external ear normal.      Nose: Nose normal.      Mouth/Throat:      Mouth: Mucous membranes are moist.      Pharynx: Oropharynx is clear.   Eyes:      Conjunctiva/sclera: Conjunctivae normal.      Pupils: Pupils are equal, round, and reactive to light.   Neck:      Thyroid: No thyromegaly.   Cardiovascular:      Rate and Rhythm: Normal rate and regular rhythm.      Heart sounds: Normal heart sounds. No murmur heard.     No friction rub. No gallop.   Pulmonary:      Effort: Pulmonary effort is normal. No respiratory distress.      Breath sounds: Normal breath sounds. No wheezing or rales.   Abdominal:      General: Bowel sounds are normal. There is no distension.      Palpations: Abdomen is soft.      Tenderness: There is no abdominal tenderness. There is no rebound.   Musculoskeletal:         General: Normal range of motion.      Cervical back: Normal range of motion and neck supple.   Lymphadenopathy:       Cervical: No cervical adenopathy.   Skin:     General: Skin is warm and dry.      Findings: No rash.   Neurological:      General: No focal deficit present.      Mental Status: She is alert and oriented to person, place, and time. Mental status is at baseline.   Psychiatric:         Attention and Perception: Attention and perception normal.         Mood and Affect: Mood and affect normal.         Speech: Speech normal.         Behavior: Behavior normal.         Thought Content: Thought content normal.         Cognition and Memory: Cognition and memory normal.         Judgment: Judgment normal.         Assessment:     1. Routine general medical examination at a health care facility    2. Encounter for screening mammogram for breast cancer    3. NIKOLAS (generalized anxiety disorder)    4. Family history of diabetes mellitus (DM)    5. Class 1 obesity due to excess calories with serious comorbidity and body mass index (BMI) of 32.0 to 32.9 in adult    6. Mixed anxiety depressive disorder    7. Other migraine without status migrainosus, not intractable        Plan:   Tova was seen today for annual exam.    Diagnoses and all orders for this visit:    Routine general medical examination at a health care facility  -     TSH; Future  -     T4, Free; Future  -     Lipid Panel; Future  -     Hemoglobin A1C; Future  -     Comprehensive Metabolic Panel; Future  -     CBC Auto Differential; Future  -     Insulin, Random; Future  -     Microalbumin/Creatinine Ratio, Urine; Future    Encounter for screening mammogram for breast cancer  -     Mammo Digital Screening Bilat w/ Jonny; Future    NIKOLAS (generalized anxiety disorder)  -     TSH; Future  -     T4, Free; Future  -     Lipid Panel; Future  -     Hemoglobin A1C; Future  -     Comprehensive Metabolic Panel; Future  -     CBC Auto Differential; Future  -     Insulin, Random; Future  -     Microalbumin/Creatinine Ratio, Urine; Future    Family history of diabetes mellitus (DM)  -      TSH; Future  -     T4, Free; Future  -     Lipid Panel; Future  -     Hemoglobin A1C; Future  -     Comprehensive Metabolic Panel; Future  -     CBC Auto Differential; Future  -     Insulin, Random; Future  -     Microalbumin/Creatinine Ratio, Urine; Future    Class 1 obesity due to excess calories with serious comorbidity and body mass index (BMI) of 32.0 to 32.9 in adult  -     TSH; Future  -     T4, Free; Future  -     Lipid Panel; Future  -     Hemoglobin A1C; Future  -     Comprehensive Metabolic Panel; Future  -     CBC Auto Differential; Future  -     Insulin, Random; Future  -     Microalbumin/Creatinine Ratio, Urine; Future    Mixed anxiety depressive disorder  -     citalopram (CELEXA) 20 MG tablet; Take 1 tablet (20 mg total) by mouth once daily.    Other migraine without status migrainosus, not intractable  -     rizatriptan (MAXALT-MLT) 5 MG disintegrating tablet; Take 1 tablet (5 mg total) by mouth as needed for Migraine. May repeat in 2 hours if needed  -     naproxen (NAPROSYN) 500 MG tablet; Take 1 tablet (500 mg total) by mouth 2 (two) times daily as needed (migraine).      - above diagnosis were discussed and reviewed today during visit. The conditions are stable. Continue with current medications and interventions until labs are reviewed to make adjustments.   Referral given to schedule mammogram.  Refilled Rx Celexa 20 mg/day for anxiety treatment.   Switched Rx to generic Maxalt 5 mg PRN for migraine treatment. Pt switched to generic due to cost.   Refilled Rx Naproxen 500 mg BID for migraine treatment.   Advised to continue with healthy eating habits and to start weight training exercises to help with weight loss.   Lab work ordered to be completed today.   Advised to continue f/u with Dr. Cruz.  Instructed to f/u in 1 year.     There are no Patient Instructions on file for this visit.    Follow up in about 1 year (around 2/28/2025) for physical with Dr RAMSEY.      LABS:   Lab Results    Component Value Date    HGBA1C 5.5 03/02/2023    HGBA1C 5.3 02/10/2022    HGBA1C 5.4 10/06/2020      Lab Results   Component Value Date    CHOL 275 (H) 03/02/2023    CHOL 268 (H) 02/16/2022    CHOL 216 (H) 11/19/2020     Lab Results   Component Value Date    LDLCALC 188.0 (H) 03/02/2023    LDLCALC 175.8 (H) 02/16/2022    LDLCALC 140.2 11/19/2020     Lab Results   Component Value Date    WBC 5.19 07/27/2023    HGB 12.2 07/27/2023    HCT 37.1 07/27/2023     07/27/2023    CHOL 275 (H) 03/02/2023    TRIG 95 03/02/2023    HDL 68 03/02/2023    ALT 26 03/02/2023    AST 24 03/02/2023     03/02/2023    K 4.5 03/02/2023     03/02/2023    CREATININE 0.9 03/02/2023    BUN 22 (H) 03/02/2023    CO2 27 03/02/2023    TSH 2.680 03/02/2023    HGBA1C 5.5 03/02/2023       The 10-year ASCVD risk score (Kim RESENDEZ, et al., 2019) is: 3.2%    Values used to calculate the score:      Age: 59 years      Sex: Female      Is Non- : No      Diabetic: No      Tobacco smoker: No      Systolic Blood Pressure: 120 mmHg      Is BP treated: No      HDL Cholesterol: 68 mg/dL      Total Cholesterol: 275 mg/dL  Mammo Digital Screening Bilat w/ Jonny  Narrative: Result:  Mammo Digital Screening Bilat w/ Jonny    History:  Patient is 58 y.o. and is seen for a screening mammogram.    Films Compared:  Compared to: 03/10/2022 Mammo Digital Screening Bilat w/ Jonny and   12/10/2020 Mammo Digital Screening Bilat w/ Jonny     Findings:   This procedure was performed using tomosynthesis.   Computer-aided detection was utilized in the interpretation of this   examination.    The breasts have scattered areas of fibroglandular density. There is no   evidence of suspicious masses, microcalcifications or architectural   distortion.  Impression:    No mammographic evidence of malignancy.    BI-RADS Category 1: Negative    Recommendation:  Routine screening mammogram in 1 year is recommended.    Your estimated lifetime risk of  breast cancer (to age 85) based on   Tyrer-Cuzick risk assessment model is 5.65 %.  According to the American   Cancer Society, patients with a lifetime breast cancer risk of 20% or   higher might benefit from supplemental screening tests. ??     Scribe Attestation:   I, Simón Stout, am scribing for, and in the presence of, Dr. Oumou Lopez MD. I performed the above scribed service and the documentation accurately describes the services I performed. I attest to the accuracy of the note.    I, Dr. Oumou Lopez MD, reviewed documentation as scribed above. I personally performed the services described in this documentation.  I agree that the record reflects my personal performance and is accurate and complete. Oumou Lopez MD.    02/29/2024

## 2024-03-01 LAB
INSULIN COLLECTION INTERVAL: NORMAL
INSULIN SERPL-ACNC: 6.7 UU/ML

## 2024-03-07 NOTE — PROGRESS NOTES
Alonso,       Attached are results released to the patient via [facility] portal for review at their upcoming appointment with you. Please let me know if you have questions or concerns prior to their appointment.     Sincerely,   Oumou Lopez MD

## 2024-03-08 ENCOUNTER — LAB VISIT (OUTPATIENT)
Dept: LAB | Facility: HOSPITAL | Age: 59
End: 2024-03-08
Attending: FAMILY MEDICINE
Payer: OTHER GOVERNMENT

## 2024-03-08 ENCOUNTER — OFFICE VISIT (OUTPATIENT)
Dept: PRIMARY CARE CLINIC | Facility: CLINIC | Age: 59
End: 2024-03-08
Payer: OTHER GOVERNMENT

## 2024-03-08 VITALS
OXYGEN SATURATION: 98 % | HEART RATE: 89 BPM | TEMPERATURE: 97 F | DIASTOLIC BLOOD PRESSURE: 80 MMHG | SYSTOLIC BLOOD PRESSURE: 122 MMHG | HEIGHT: 61 IN | WEIGHT: 166.88 LBS | BODY MASS INDEX: 31.51 KG/M2

## 2024-03-08 DIAGNOSIS — D75.89 MACROCYTOSIS: ICD-10-CM

## 2024-03-08 DIAGNOSIS — G89.29 CHRONIC NONINTRACTABLE HEADACHE, UNSPECIFIED HEADACHE TYPE: ICD-10-CM

## 2024-03-08 DIAGNOSIS — E78.5 HYPERLIPIDEMIA, UNSPECIFIED HYPERLIPIDEMIA TYPE: ICD-10-CM

## 2024-03-08 DIAGNOSIS — F41.1 GAD (GENERALIZED ANXIETY DISORDER): ICD-10-CM

## 2024-03-08 DIAGNOSIS — R51.9 CHRONIC NONINTRACTABLE HEADACHE, UNSPECIFIED HEADACHE TYPE: ICD-10-CM

## 2024-03-08 DIAGNOSIS — E66.09 CLASS 1 OBESITY DUE TO EXCESS CALORIES WITH SERIOUS COMORBIDITY AND BODY MASS INDEX (BMI) OF 31.0 TO 31.9 IN ADULT: ICD-10-CM

## 2024-03-08 DIAGNOSIS — D75.89 MACROCYTOSIS: Primary | ICD-10-CM

## 2024-03-08 LAB
FOLATE SERPL-MCNC: 11.1 NG/ML (ref 4–24)
VIT B12 SERPL-MCNC: 343 PG/ML (ref 210–950)

## 2024-03-08 PROCEDURE — 99999 PR PBB SHADOW E&M-EST. PATIENT-LVL V: CPT | Mod: PBBFAC,,, | Performed by: FAMILY MEDICINE

## 2024-03-08 PROCEDURE — 36415 COLL VENOUS BLD VENIPUNCTURE: CPT | Performed by: FAMILY MEDICINE

## 2024-03-08 PROCEDURE — 82746 ASSAY OF FOLIC ACID SERUM: CPT | Performed by: FAMILY MEDICINE

## 2024-03-08 PROCEDURE — 82607 VITAMIN B-12: CPT | Performed by: FAMILY MEDICINE

## 2024-03-08 PROCEDURE — 99214 OFFICE O/P EST MOD 30 MIN: CPT | Mod: S$PBB,,, | Performed by: FAMILY MEDICINE

## 2024-03-08 PROCEDURE — 99215 OFFICE O/P EST HI 40 MIN: CPT | Mod: PBBFAC | Performed by: FAMILY MEDICINE

## 2024-03-08 RX ORDER — PHENTERMINE AND TOPIRAMATE 11.25; 69 MG/1; MG/1
1 CAPSULE, EXTENDED RELEASE ORAL DAILY
Qty: 30 CAPSULE | Refills: 2 | Status: SHIPPED | OUTPATIENT
Start: 2024-03-08 | End: 2024-03-18

## 2024-03-08 RX ORDER — PHENTERMINE AND TOPIRAMATE 11.25; 69 MG/1; MG/1
1 CAPSULE, EXTENDED RELEASE ORAL DAILY
Qty: 30 CAPSULE | Refills: 1 | Status: CANCELLED | OUTPATIENT
Start: 2024-03-08 | End: 2024-03-18

## 2024-03-08 NOTE — PROGRESS NOTES
Subjective       Labs :   Lipids elevated, macrocytosis (both chronic) without anemia    Patient ID: Tova Muñoz is a 59 y.o. female.    Chief Complaint: follow up    LOV   Pt has been on qsymia; tolerating.   Per home scale up to -27 lb with lifestyle interventions and medication    Dad: CHF, Mom afib:   Pt is willing to dw cardiology   has had stent; pt with no hx CAD    Reports change in taste of carbonated drinks  No paresthesias.   Had dietitian visit  Lifestyle interventions: added more water,       Tolerating celexa, qsymia. Anxiety and stress are stable.   HA stable overall; has maxalt.         HA hx:  stable    Pt had increase of qysmia to 11 mg dosage. Denied with .  First month had some adjustment with topamax (few words lost). Has improved and has not impacted daily life. Per home scales feels like has broken through plateau. Has minerva able to add more water.     Had good visit with dietitian.     Overall sleep is good. Mood has been okay. NO renal stone hx.   NO mood changes/mood is good. No palpitations or tachycardia. She has worked to make sure is eating.     Consistent with protein drinks.     Denies cp/sob.   BP has been stable.       HPI       Objective     PAST MEDICAL HISTORY:  Past Medical History:   Diagnosis Date    Allergy     BCC (basal cell carcinoma of skin)     nose    Colon polyp     Migraine headache     Sinusitis          PAST SURGICAL HISTORY:  Past Surgical History:   Procedure Laterality Date    AUGMENTATION OF BREAST          BAND HEMORRHOIDECTOMY      BASAL CELL CARCINOMA EXCISION  2015    breast augumentation      BREAST SURGERY Bilateral     saline implants     SECTION      x2    COLONOSCOPY N/A 2016    Procedure: COLONOSCOPY;  Surgeon: Dali Rodriguez MD;  Location: [facility];  Service: Endoscopy;  Laterality: N/A;    COLONOSCOPY N/A 08/15/2022    Procedure: COLONOSCOPY;  Surgeon: Shayy William MD;  Location: [facility];   Service: Endoscopy;  Laterality: N/A;    EYE SURGERY  2000    Lasik    LASIX      SKIN BIOPSY      TUBAL LIGATION         FAMILY HISTORY:  Family History   Problem Relation Age of Onset    Asthma Mother     Diabetes Mother     Emphysema Mother     Hypertension Mother     Basal cell carcinoma Mother     Melanoma Mother     Arthritis Mother     Cancer Mother         Basal cell and a melanoma    COPD Mother     Asthma Sister     Cancer Sister         Basal Cell    Basal cell carcinoma Sister     Vulvar Cancer Sister     Heart disease Father 75        CHF    Cancer Father         Lung Cancer    COPD Father     Cancer Paternal Grandmother         Uterine    Ovarian cancer Paternal Grandmother     Diabetes Maternal Grandfather     Arthritis Maternal Grandmother     Miscarriages / Stillbirths Maternal Grandmother     Arthritis Maternal Aunt     Diabetes Maternal Aunt     Cancer Sister         Vulvar Cancer (Squamous Cell)    Depression Sister     Diabetes Maternal Uncle     Eczema Neg Hx     Lupus Neg Hx     Psoriasis Neg Hx           SOCIAL HISTORY:  Social History     Social History Narrative    Not on file       MEDICATIONS:  Medications have been reviewed.    ALLERGIES:  Allergies have been reviewed.    Vitals:    03/08/24 0819   BP: 122/80   Pulse: 89   Temp: 96.6 °F (35.9 °C)     Wt Readings from Last 10 Encounters:   03/08/24 75.7 kg (166 lb 14.2 oz)   02/29/24 77 kg (169 lb 13.8 oz)   02/08/24 75.9 kg (167 lb 5.3 oz)   02/07/24 75.9 kg (167 lb 5.3 oz)   12/22/23 77.1 kg (169 lb 15.6 oz)   10/26/23 78.2 kg (172 lb 6.4 oz)   10/06/23 79.4 kg (175 lb 0.7 oz)   08/17/23 81.1 kg (178 lb 12.7 oz)   08/10/23 82.6 kg (182 lb)   07/27/23 82.9 kg (182 lb 12.2 oz)       Lab Results   Component Value Date    WBC 3.15 (L) 02/29/2024    HGB 12.8 02/29/2024    HCT 39.7 02/29/2024     02/29/2024    CHOL 269 (H) 02/29/2024    TRIG 76 02/29/2024    HDL 66 02/29/2024    ALT 15 02/29/2024    AST 22 02/29/2024      02/29/2024    K 4.5 02/29/2024     02/29/2024    CREATININE 0.8 02/29/2024    BUN 22 (H) 02/29/2024    CO2 23 02/29/2024    TSH 2.098 02/29/2024    HGBA1C 5.5 02/29/2024       Review of Systems   Constitutional:  Negative for activity change, appetite change, fatigue and fever.   HENT:  Negative for mouth dryness and goiter.    Eyes:  Negative for visual disturbance.   Respiratory:  Negative for apnea, cough, chest tightness and shortness of breath.    Cardiovascular:  Negative for chest pain, palpitations and leg swelling.   Gastrointestinal:  Negative for abdominal pain, constipation, diarrhea, nausea, vomiting and reflux.   Endocrine: Negative for cold intolerance, heat intolerance, polydipsia, polyphagia and polyuria.   Genitourinary:  Negative for frequency and menstrual problem.   Musculoskeletal:  Negative for arthralgias and myalgias.   Integumentary:  Negative for color change and rash.   Neurological:  Negative for vertigo, tremors, seizures, speech difficulty, weakness, light-headedness and headaches.   Psychiatric/Behavioral:  Negative for self-injury, sleep disturbance and suicidal ideas. The patient is not nervous/anxious.        Physical Exam  Vitals and nursing note reviewed.   Constitutional:       General: She is not in acute distress.  HENT:      Head: Normocephalic and atraumatic.      Mouth/Throat:      Pharynx: Oropharynx is clear.   Eyes:      General: No scleral icterus.     Pupils: Pupils are equal, round, and reactive to light.   Neck:      Comments: No TM  Cardiovascular:      Rate and Rhythm: Normal rate and regular rhythm.      Pulses: Normal pulses.      Heart sounds: Normal heart sounds. No murmur heard.     No friction rub. No gallop.   Pulmonary:      Effort: Pulmonary effort is normal. No respiratory distress.      Breath sounds: Normal breath sounds. No wheezing, rhonchi or rales.   Abdominal:      General: Bowel sounds are normal. There is no distension.      Palpations:  Abdomen is soft.      Tenderness: There is no abdominal tenderness.   Musculoskeletal:         General: No swelling.      Cervical back: Normal range of motion and neck supple. No tenderness.   Lymphadenopathy:      Cervical: No cervical adenopathy.   Skin:     General: Skin is warm.      Findings: No erythema or rash.   Neurological:      Mental Status: She is alert and oriented to person, place, and time.   Psychiatric:         Mood and Affect: Mood normal.         Behavior: Behavior normal.              Assessment and Plan     1. Macrocytosis    2. NIKOLAS (generalized anxiety disorder)    3. Hyperlipidemia, unspecified hyperlipidemia type    4. Chronic nonintractable headache, unspecified headache type    5. Class 1 obesity due to excess calories with serious comorbidity and body mass index (BMI) of 31.0 to 31.9 in adult      Macrocytosis  -     Vitamin B12; Future; Expected date: 03/08/2024  -     FOLATE; Future; Expected date: 03/08/2024    NIKOLAS (generalized anxiety disorder)  Chronic/stable    Hyperlipidemia, unspecified hyperlipidemia type  -     EKG 12-lead; Future  -     Ambulatory referral/consult to Cardiology; Future; Expected date: 03/15/2024    Chronic nonintractable headache, unspecified headache type    Class 1 obesity due to excess calories with serious comorbidity and body mass index (BMI) of 31.0 to 31.9 in adult  -     EKG 12-lead; Future  -     Ambulatory referral/consult to Cardiology; Future; Expected date: 03/15/2024  -     phentermine-topiramate (QSYMIA) 11.25-69 mg CM24; Take 1 capsule by mouth once daily. for 10 days  Dispense: 30 capsule; Refill: 2  Reviewed risks/benefits/se's        reviewed  Follow up in about 4 weeks (around 4/5/2024), or if symptoms worsen or fail to improve.

## 2024-03-12 ENCOUNTER — HOSPITAL ENCOUNTER (OUTPATIENT)
Dept: CARDIOLOGY | Facility: HOSPITAL | Age: 59
Discharge: HOME | End: 2024-03-12
Payer: OTHER GOVERNMENT

## 2024-03-12 DIAGNOSIS — E78.5 HYPERLIPIDEMIA, UNSPECIFIED HYPERLIPIDEMIA TYPE: ICD-10-CM

## 2024-03-12 DIAGNOSIS — E66.09 CLASS 1 OBESITY DUE TO EXCESS CALORIES WITH SERIOUS COMORBIDITY AND BODY MASS INDEX (BMI) OF 31.0 TO 31.9 IN ADULT: ICD-10-CM

## 2024-03-12 LAB
OHS QRS DURATION: 74 MS
OHS QTC CALCULATION: 444 MS

## 2024-03-12 PROCEDURE — 93010 ELECTROCARDIOGRAM REPORT: CPT | Mod: ,,, | Performed by: INTERNAL MEDICINE

## 2024-03-12 PROCEDURE — 93005 ELECTROCARDIOGRAM TRACING: CPT

## 2024-03-28 ENCOUNTER — OFFICE VISIT (OUTPATIENT)
Dept: CARDIOLOGY | Facility: CLINIC | Age: 59
End: 2024-03-28
Payer: OTHER GOVERNMENT

## 2024-03-28 VITALS
OXYGEN SATURATION: 96 % | SYSTOLIC BLOOD PRESSURE: 119 MMHG | WEIGHT: 168.19 LBS | DIASTOLIC BLOOD PRESSURE: 76 MMHG | BODY MASS INDEX: 31.78 KG/M2 | HEART RATE: 90 BPM

## 2024-03-28 DIAGNOSIS — E78.5 HYPERLIPIDEMIA, UNSPECIFIED HYPERLIPIDEMIA TYPE: ICD-10-CM

## 2024-03-28 DIAGNOSIS — E66.09 CLASS 1 OBESITY DUE TO EXCESS CALORIES WITH SERIOUS COMORBIDITY AND BODY MASS INDEX (BMI) OF 31.0 TO 31.9 IN ADULT: ICD-10-CM

## 2024-03-28 PROCEDURE — 99244 OFF/OP CNSLTJ NEW/EST MOD 40: CPT | Mod: S$PBB,,, | Performed by: INTERNAL MEDICINE

## 2024-03-28 PROCEDURE — 99999 PR PBB SHADOW E&M-EST. PATIENT-LVL III: CPT | Mod: PBBFAC,,, | Performed by: INTERNAL MEDICINE

## 2024-03-28 PROCEDURE — 99213 OFFICE O/P EST LOW 20 MIN: CPT | Mod: PBBFAC | Performed by: INTERNAL MEDICINE

## 2024-03-28 RX ORDER — PHENTERMINE AND TOPIRAMATE 11.25; 69 MG/1; MG/1
CAPSULE, EXTENDED RELEASE ORAL
COMMUNITY
End: 2024-05-24 | Stop reason: SDUPTHER

## 2024-03-28 RX ORDER — ATORVASTATIN CALCIUM 20 MG/1
20 TABLET, FILM COATED ORAL NIGHTLY
Qty: 90 TABLET | Refills: 3 | Status: SHIPPED | OUTPATIENT
Start: 2024-03-28 | End: 2025-03-28

## 2024-03-28 NOTE — PROGRESS NOTES
Subjective:   Patient ID:  Tova Muñoz is a 59 y.o. female who presents for evaluation of No chief complaint on file.      60 yo female, referred for HLD by Dr. Lopez  [facility] HLD,anxiety  obesity. No h/o MI DM CVA and Ca. Mother has DM COPD AFIB. Father  of lung Ca. One sibling PE at age of 55 and breast Ca.   Rx with phentermine and so far weight loss 30 lbs.   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   Occasional chest tightness with emotional stress.  SOB with steps up  Occasional dizziness   The 10-year ASCVD risk score (Kim DK, et al., 2019) is: 3.2%    Values used to calculate the score:      Age: 59 years      Sex: Female      Is Non- : No      Diabetic: No      Tobacco smoker: No      Systolic Blood Pressure: 119 mmHg      Is BP treated: No      HDL Cholesterol: 66 mg/dL      Total Cholesterol: 269 mg/dL  LDL is 189          No results found for this or any previous visit.     No results found for this or any previous visit.       Past Medical History:   Diagnosis Date    Allergy     BCC (basal cell carcinoma of skin)     nose    Colon polyp     Migraine headache     Sinusitis        Past Surgical History:   Procedure Laterality Date    AUGMENTATION OF BREAST          BAND HEMORRHOIDECTOMY      BASAL CELL CARCINOMA EXCISION  2015    breast augumentation      BREAST SURGERY Bilateral 1989    saline implants     SECTION      x2    COLONOSCOPY N/A 2016    Procedure: COLONOSCOPY;  Surgeon: Dali Rodriguez MD;  Location: [facility] ENDO;  Service: Endoscopy;  Laterality: N/A;    COLONOSCOPY N/A 08/15/2022    Procedure: COLONOSCOPY;  Surgeon: Shayy William MD;  Location: [facility] ENDO;  Service: Endoscopy;  Laterality: N/A;    EYE SURGERY      Lasik    LASIX      SKIN BIOPSY      TUBAL LIGATION         Social History     Tobacco Use    Smoking  status: Never     Passive exposure: Never    Smokeless tobacco: Never   Substance Use Topics    Alcohol use: Yes     Alcohol/week: 2.0 - 3.0 standard drinks of alcohol     Types: 2 - 3 Cans of beer per week    Drug use: Never       Family History   Problem Relation Age of Onset    Asthma Mother     Diabetes Mother     Emphysema Mother     Hypertension Mother     Basal cell carcinoma Mother     Melanoma Mother     Arthritis Mother     Cancer Mother         Basal cell and a melanoma    COPD Mother     Asthma Sister     Cancer Sister         Basal Cell    Basal cell carcinoma Sister     Vulvar Cancer Sister     Heart disease Father 75        CHF    Cancer Father         Lung Cancer    COPD Father     Cancer Paternal Grandmother         Uterine    Ovarian cancer Paternal Grandmother     Diabetes Maternal Grandfather     Arthritis Maternal Grandmother     Miscarriages / Stillbirths Maternal Grandmother     Arthritis Maternal Aunt     Diabetes Maternal Aunt     Cancer Sister         Vulvar Cancer (Squamous Cell)    Depression Sister     Diabetes Maternal Uncle     Eczema Neg Hx     Lupus Neg Hx     Psoriasis Neg Hx        Review of Systems   Constitutional: Negative for decreased appetite, diaphoresis, fever, malaise/fatigue and night sweats.   HENT:  Negative for nosebleeds.    Eyes:  Negative for blurred vision and double vision.   Cardiovascular:  Positive for chest pain and dyspnea on exertion. Negative for claudication, irregular heartbeat, leg swelling, near-syncope, orthopnea, palpitations, paroxysmal nocturnal dyspnea and syncope.   Respiratory:  Negative for cough, shortness of breath, sleep disturbances due to breathing, snoring, sputum production and wheezing.    Endocrine: Negative for cold intolerance and polyuria.   Hematologic/Lymphatic: Does not bruise/bleed easily.   Skin:  Negative for rash.   Musculoskeletal:  Negative for back pain, falls, joint pain, joint swelling and neck pain.   Gastrointestinal:   Negative for abdominal pain, heartburn, nausea and vomiting.   Genitourinary:  Negative for dysuria, frequency and hematuria.   Neurological:  Negative for difficulty with concentration, dizziness, focal weakness, headaches, light-headedness, numbness, seizures and weakness.   Psychiatric/Behavioral:  Negative for depression, memory loss and substance abuse. The patient does not have insomnia.    Allergic/Immunologic: Negative for HIV exposure and hives.       Objective:   Physical Exam  HENT:      Head: Normocephalic.   Eyes:      Pupils: Pupils are equal, round, and reactive to light.   Neck:      Thyroid: No thyromegaly.      Vascular: Normal carotid pulses. No carotid bruit or JVD.   Cardiovascular:      Rate and Rhythm: Normal rate and regular rhythm. No extrasystoles are present.     Chest Wall: PMI is not displaced.      Pulses: Normal pulses.      Heart sounds: Normal heart sounds. No murmur heard.     No gallop. No S3 sounds.   Pulmonary:      Effort: No respiratory distress.      Breath sounds: Normal breath sounds. No stridor.   Abdominal:      General: Bowel sounds are normal.      Palpations: Abdomen is soft.      Tenderness: There is no abdominal tenderness. There is no rebound.   Musculoskeletal:         General: Normal range of motion.   Skin:     Findings: No rash.   Neurological:      Mental Status: She is alert and oriented to person, place, and time.   Psychiatric:         Behavior: Behavior normal.         Lab Results   Component Value Date    CHOL 269 (H) 02/29/2024    CHOL 275 (H) 03/02/2023    CHOL 268 (H) 02/16/2022     Lab Results   Component Value Date    HDL 66 02/29/2024    HDL 68 03/02/2023    HDL 74 02/16/2022     Lab Results   Component Value Date    LDLCALC 187.8 (H) 02/29/2024    LDLCALC 188.0 (H) 03/02/2023    LDLCALC 175.8 (H) 02/16/2022     Lab Results   Component Value Date    TRIG 76 02/29/2024    TRIG 95 03/02/2023    TRIG 91 02/16/2022     Lab Results   Component Value Date  "   CHOLHDL 24.5 02/29/2024    CHOLHDL 24.7 03/02/2023    CHOLHDL 27.6 02/16/2022       Chemistry        Component Value Date/Time     02/29/2024 0845    K 4.5 02/29/2024 0845     02/29/2024 0845    CO2 23 02/29/2024 0845    BUN 22 (H) 02/29/2024 0845    CREATININE 0.8 02/29/2024 0845    GLU 90 02/29/2024 0845        Component Value Date/Time    CALCIUM 9.7 02/29/2024 0845    ALKPHOS 63 02/29/2024 0845    AST 22 02/29/2024 0845    ALT 15 02/29/2024 0845    BILITOT 0.3 02/29/2024 0845    ESTGFRAFRICA >60.0 02/10/2022 0800    EGFRNONAA >60.0 02/10/2022 0800          Lab Results   Component Value Date    HGBA1C 5.5 02/29/2024     Lab Results   Component Value Date    TSH 2.098 02/29/2024     No results found for: "INR", "PROTIME"  Lab Results   Component Value Date    WBC 3.15 (L) 02/29/2024    HGB 12.8 02/29/2024    HCT 39.7 02/29/2024     (H) 02/29/2024     02/29/2024     BNP  @LABRCNTIP(BNP,BNPTRIAGEBLO)@  CrCl cannot be calculated (Patient's most recent lab result is older than the maximum 7 days allowed.).  No results found in the last 24 hours.  No results found in the last 24 hours.  No results found in the last 24 hours.    Assessment:      1. Class 1 obesity due to excess calories with serious comorbidity and body mass index (BMI) of 31.0 to 31.9 in adult    2. Hyperlipidemia, unspecified hyperlipidemia type          Plan:   Class 1 obesity due to excess calories with serious comorbidity and body mass index (BMI) of 31.0 to 31.9 in adult  -     Ambulatory referral/consult to Cardiology    Hyperlipidemia, unspecified hyperlipidemia type  -     Ambulatory referral/consult to Cardiology          Add Lipitor 20 mg daily. Discussed the benefits and risks of statin Rx  CMP and Lipid profile   Phone review    Counseled DASH  Check Lipid profile with PCP in 6 months  Recommend heart-healthy diet, weight control and regular exercise.  Airam. Risk modification.   I have reviewed all pertinent " labs and cardiac studies independently. Plans and recommendations have been formulated under my direct supervision. All questions answered and patient voiced understanding.

## 2024-04-08 ENCOUNTER — OFFICE VISIT (OUTPATIENT)
Dept: PRIMARY CARE CLINIC | Facility: CLINIC | Age: 59
End: 2024-04-08
Payer: OTHER GOVERNMENT

## 2024-04-08 DIAGNOSIS — E78.49 OTHER HYPERLIPIDEMIA: ICD-10-CM

## 2024-04-08 DIAGNOSIS — G89.29 CHRONIC NONINTRACTABLE HEADACHE, UNSPECIFIED HEADACHE TYPE: ICD-10-CM

## 2024-04-08 DIAGNOSIS — E66.09 CLASS 1 OBESITY DUE TO EXCESS CALORIES WITH SERIOUS COMORBIDITY AND BODY MASS INDEX (BMI) OF 31.0 TO 31.9 IN ADULT: ICD-10-CM

## 2024-04-08 DIAGNOSIS — F41.1 GAD (GENERALIZED ANXIETY DISORDER): ICD-10-CM

## 2024-04-08 DIAGNOSIS — R63.5 WEIGHT GAIN: ICD-10-CM

## 2024-04-08 DIAGNOSIS — R51.9 CHRONIC NONINTRACTABLE HEADACHE, UNSPECIFIED HEADACHE TYPE: ICD-10-CM

## 2024-04-08 PROCEDURE — 99214 OFFICE O/P EST MOD 30 MIN: CPT | Mod: 95,,, | Performed by: FAMILY MEDICINE

## 2024-04-08 NOTE — PROGRESS NOTES
"Subjective     The patient location is: [place]  The chief complaint leading to consultation is: follow up    Visit type: audiovisual    Face to Face time with patient: 14  17 minutes of total time spent on the encounter, which includes face to face time and non-face to face time preparing to see the patient (eg, review of tests), Obtaining and/or reviewing separately obtained history, Documenting clinical information in the electronic or other health record, Independently interpreting results (not separately reported) and communicating results to the patient/family/caregiver, or Care coordination (not separately reported).         Each patient to whom he or she provides medical services by telemedicine is:  (1) informed of the relationship between the physician and patient and the respective role of any other health care provider with respect to management of the patient; and (2) notified that he or she may decline to receive medical services by telemedicine and may withdraw from such care at any time.    Notes:     Patient ID: Tova Muñoz is a 59 y.o. female.    Chief Complaint: follow up    Pt seen 3/24.     She reports doing well overall.     Reports change in taste of carbonated drinks  No paresthesias.   Had dietitian visit  Lifestyle interventions: added more water,    she reports cost was only minimally elevated.     She has notice a mild amount of "losing words" with increase dosage. Mood is good. Weight is similar to where was at last visit. She still is working on optimizing water and trying to get enough protein.      Tolerating celexa, qsymia. Anxiety and stress are stable.   HA stable overall; has maxalt.     She is out of busy season so feels can devote more time to lifestyle interventions.     Has overall lost close to 30 lb.     HA stable. Overall has had good bp per pt.     Tolerating statin.     Celexa is helpful.          Wt Readings from Last 3 Encounters:   03/28/24 0754 76.3 kg (168 lb 3.4 " oz)   24 0819 75.7 kg (166 lb 14.2 oz)   24 0756 77 kg (169 lb 13.8 oz)          HPI       Objective     PAST MEDICAL HISTORY:  Past Medical History:   Diagnosis Date    Allergy     BCC (basal cell carcinoma of skin)     nose    Colon polyp     Migraine headache     Other hyperlipidemia 2024    Sinusitis          PAST SURGICAL HISTORY:  Past Surgical History:   Procedure Laterality Date    AUGMENTATION OF BREAST          BAND HEMORRHOIDECTOMY      BASAL CELL CARCINOMA EXCISION  2015    breast augumentation      BREAST SURGERY Bilateral 1989    saline implants     SECTION      x2    COLONOSCOPY N/A 2016    Procedure: COLONOSCOPY;  Surgeon: Dali Rodriguez MD;  Location: [facility] ENDO;  Service: Endoscopy;  Laterality: N/A;    COLONOSCOPY N/A 08/15/2022    Procedure: COLONOSCOPY;  Surgeon: Shayy William MD;  Location: [facility] ENDO;  Service: Endoscopy;  Laterality: N/A;    EYE SURGERY      Lasik    LASIX      SKIN BIOPSY      TUBAL LIGATION         FAMILY HISTORY:  Family History   Problem Relation Age of Onset    Asthma Mother     Diabetes Mother     Emphysema Mother     Hypertension Mother     Basal cell carcinoma Mother     Melanoma Mother     Arthritis Mother     Cancer Mother         Basal cell and a melanoma    COPD Mother     Asthma Sister     Cancer Sister         Basal Cell    Basal cell carcinoma Sister     Vulvar Cancer Sister     Heart disease Father 75        CHF    Cancer Father         Lung Cancer    COPD Father     Cancer Paternal Grandmother         Uterine    Ovarian cancer Paternal Grandmother     Diabetes Maternal Grandfather     Arthritis Maternal Grandmother     Miscarriages / Stillbirths Maternal Grandmother     Arthritis Maternal Aunt     Diabetes Maternal Aunt     Cancer Sister         Vulvar Cancer (Squamous Cell)    Depression Sister     Diabetes Maternal Uncle     Eczema Neg Hx     Lupus Neg Hx     Psoriasis Neg Hx           SOCIAL HISTORY:  Social  History     Social History Narrative    Not on file       MEDICATIONS:  Medications have been reviewed.    ALLERGIES:  Allergies have been reviewed.    There were no vitals filed for this visit.  Wt Readings from Last 10 Encounters:   03/28/24 76.3 kg (168 lb 3.4 oz)   03/08/24 75.7 kg (166 lb 14.2 oz)   02/29/24 77 kg (169 lb 13.8 oz)   02/08/24 75.9 kg (167 lb 5.3 oz)   02/07/24 75.9 kg (167 lb 5.3 oz)   12/22/23 77.1 kg (169 lb 15.6 oz)   10/26/23 78.2 kg (172 lb 6.4 oz)   10/06/23 79.4 kg (175 lb 0.7 oz)   08/17/23 81.1 kg (178 lb 12.7 oz)   08/10/23 82.6 kg (182 lb)       Lab Results   Component Value Date    WBC 3.15 (L) 02/29/2024    HGB 12.8 02/29/2024    HCT 39.7 02/29/2024     02/29/2024    CHOL 269 (H) 02/29/2024    TRIG 76 02/29/2024    HDL 66 02/29/2024    ALT 15 02/29/2024    AST 22 02/29/2024     02/29/2024    K 4.5 02/29/2024     02/29/2024    CREATININE 0.8 02/29/2024    BUN 22 (H) 02/29/2024    CO2 23 02/29/2024    TSH 2.098 02/29/2024    HGBA1C 5.5 02/29/2024       Review of Systems   Constitutional:  Negative for activity change, appetite change, fatigue and fever.   HENT:  Negative for mouth dryness and goiter.    Eyes:  Negative for visual disturbance.   Respiratory:  Negative for apnea, cough, chest tightness and shortness of breath.    Cardiovascular:  Negative for chest pain, palpitations and leg swelling.   Gastrointestinal:  Negative for abdominal pain, constipation and diarrhea.   Endocrine: Negative for cold intolerance, heat intolerance, polydipsia, polyphagia and polyuria.   Genitourinary:  Negative for frequency and menstrual problem.   Musculoskeletal:  Negative for arthralgias and myalgias.   Integumentary:  Negative for color change and rash.   Psychiatric/Behavioral:  Negative for sleep disturbance. The patient is not nervous/anxious.        Physical Exam  Constitutional:       General: She is not in acute distress.     Appearance: Normal appearance.   HENT:       Head: Normocephalic and atraumatic.   Eyes:      General: No scleral icterus.  Pulmonary:      Effort: Pulmonary effort is normal. No respiratory distress.   Neurological:      Mental Status: She is alert and oriented to person, place, and time.   Psychiatric:         Mood and Affect: Mood normal.         Behavior: Behavior normal.              Assessment and Plan       1. Weight gain    2. Class 1 obesity due to excess calories with serious comorbidity and body mass index (BMI) of 31.0 to 31.9 in adult    3. NIKOLAS (generalized anxiety disorder)    4. Chronic nonintractable headache, unspecified headache type    5. Other hyperlipidemia      Weight gain    Class 1 obesity due to excess calories with serious comorbidity and body mass index (BMI) of 31.0 to 31.9 in adult  Comments:  continue qsymia; implement lifestyle changes    NIKOLAS (generalized anxiety disorder)  Comments:  chronic/stable  on celexa    Chronic nonintractable headache, unspecified headache type  Comments:  chronic/stable; olesya pcp appt    Other hyperlipidemia  On lipitor; tolerating  Dr Borden pt states he was agreeable to her being on qysmia  On B complex vitamin    Chronic/stable      reviewed--qsymia     Follow up in about 6 weeks (around 5/20/2024), or if symptoms worsen or fail to improve.

## 2024-05-02 ENCOUNTER — LAB VISIT (OUTPATIENT)
Dept: LAB | Facility: HOSPITAL | Age: 59
End: 2024-05-02
Attending: INTERNAL MEDICINE
Payer: OTHER GOVERNMENT

## 2024-05-02 DIAGNOSIS — E78.5 HYPERLIPIDEMIA, UNSPECIFIED HYPERLIPIDEMIA TYPE: ICD-10-CM

## 2024-05-02 LAB
ALBUMIN SERPL BCP-MCNC: 3.8 G/DL (ref 3.5–5.2)
ALP SERPL-CCNC: 77 U/L (ref 55–135)
ALT SERPL W/O P-5'-P-CCNC: 25 U/L (ref 10–44)
ANION GAP SERPL CALC-SCNC: 5 MMOL/L (ref 8–16)
AST SERPL-CCNC: 22 U/L (ref 10–40)
BILIRUB SERPL-MCNC: 0.4 MG/DL (ref 0.1–1)
BUN SERPL-MCNC: 21 MG/DL (ref 6–20)
CALCIUM SERPL-MCNC: 8.9 MG/DL (ref 8.7–10.5)
CHLORIDE SERPL-SCNC: 109 MMOL/L (ref 95–110)
CHOLEST SERPL-MCNC: 174 MG/DL (ref 120–199)
CHOLEST/HDLC SERPL: 2.8 {RATIO} (ref 2–5)
CO2 SERPL-SCNC: 25 MMOL/L (ref 23–29)
CREAT SERPL-MCNC: 0.8 MG/DL (ref 0.5–1.4)
EST. GFR  (NO RACE VARIABLE): >60 ML/MIN/1.73 M^2
GLUCOSE SERPL-MCNC: 88 MG/DL (ref 70–110)
HDLC SERPL-MCNC: 63 MG/DL (ref 40–75)
HDLC SERPL: 36.2 % (ref 20–50)
LDLC SERPL CALC-MCNC: 102.4 MG/DL (ref 63–159)
NONHDLC SERPL-MCNC: 111 MG/DL
POTASSIUM SERPL-SCNC: 3.8 MMOL/L (ref 3.5–5.1)
PROT SERPL-MCNC: 6.8 G/DL (ref 6–8.4)
SODIUM SERPL-SCNC: 139 MMOL/L (ref 136–145)
TRIGL SERPL-MCNC: 43 MG/DL (ref 30–150)

## 2024-05-02 PROCEDURE — 80053 COMPREHEN METABOLIC PANEL: CPT | Performed by: INTERNAL MEDICINE

## 2024-05-02 PROCEDURE — 80061 LIPID PANEL: CPT | Performed by: INTERNAL MEDICINE

## 2024-05-02 PROCEDURE — 36415 COLL VENOUS BLD VENIPUNCTURE: CPT | Performed by: INTERNAL MEDICINE

## 2024-05-03 ENCOUNTER — TELEPHONE (OUTPATIENT)
Dept: CARDIOLOGY | Facility: CLINIC | Age: 59
End: 2024-05-03
Payer: OTHER GOVERNMENT

## 2024-05-03 NOTE — TELEPHONE ENCOUNTER
The patient has been notified of the results.          LDL decrease to 101.   Continue current Rx.

## 2024-05-09 ENCOUNTER — HOSPITAL ENCOUNTER (OUTPATIENT)
Dept: RADIOLOGY | Facility: HOSPITAL | Age: 59
Discharge: HOME | End: 2024-05-09
Attending: FAMILY MEDICINE
Payer: OTHER GOVERNMENT

## 2024-05-09 VITALS — HEIGHT: 61 IN | BODY MASS INDEX: 31.75 KG/M2 | WEIGHT: 168.19 LBS

## 2024-05-09 DIAGNOSIS — Z12.31 ENCOUNTER FOR SCREENING MAMMOGRAM FOR BREAST CANCER: ICD-10-CM

## 2024-05-09 PROCEDURE — 77063 BREAST TOMOSYNTHESIS BI: CPT | Mod: 26,,, | Performed by: RADIOLOGY

## 2024-05-09 PROCEDURE — 77067 SCR MAMMO BI INCL CAD: CPT | Mod: TC

## 2024-05-09 PROCEDURE — 77067 SCR MAMMO BI INCL CAD: CPT | Mod: 26,,, | Performed by: RADIOLOGY

## 2024-05-14 NOTE — PROGRESS NOTES
Your mammogram is normal. You will need to repeat the exam again in 1-2 years. If you have further questions please let me know. Oumou Lopez MD

## 2024-05-16 ENCOUNTER — TELEPHONE (OUTPATIENT)
Dept: PRIMARY CARE CLINIC | Facility: CLINIC | Age: 59
End: 2024-05-16
Payer: OTHER GOVERNMENT

## 2024-05-24 ENCOUNTER — OFFICE VISIT (OUTPATIENT)
Dept: PRIMARY CARE CLINIC | Facility: CLINIC | Age: 59
End: 2024-05-24
Payer: OTHER GOVERNMENT

## 2024-05-24 VITALS
TEMPERATURE: 98 F | BODY MASS INDEX: 30.93 KG/M2 | HEART RATE: 67 BPM | RESPIRATION RATE: 18 BRPM | SYSTOLIC BLOOD PRESSURE: 122 MMHG | WEIGHT: 163.81 LBS | DIASTOLIC BLOOD PRESSURE: 82 MMHG | OXYGEN SATURATION: 97 % | HEIGHT: 61 IN

## 2024-05-24 DIAGNOSIS — G89.29 CHRONIC NONINTRACTABLE HEADACHE, UNSPECIFIED HEADACHE TYPE: ICD-10-CM

## 2024-05-24 DIAGNOSIS — F41.1 GAD (GENERALIZED ANXIETY DISORDER): ICD-10-CM

## 2024-05-24 DIAGNOSIS — E66.09 CLASS 1 OBESITY DUE TO EXCESS CALORIES WITH SERIOUS COMORBIDITY AND BODY MASS INDEX (BMI) OF 30.0 TO 30.9 IN ADULT: ICD-10-CM

## 2024-05-24 DIAGNOSIS — E78.49 OTHER HYPERLIPIDEMIA: ICD-10-CM

## 2024-05-24 DIAGNOSIS — R51.9 CHRONIC NONINTRACTABLE HEADACHE, UNSPECIFIED HEADACHE TYPE: ICD-10-CM

## 2024-05-24 PROCEDURE — 99999 PR PBB SHADOW E&M-EST. PATIENT-LVL IV: CPT | Mod: PBBFAC,,, | Performed by: FAMILY MEDICINE

## 2024-05-24 PROCEDURE — 99214 OFFICE O/P EST MOD 30 MIN: CPT | Mod: PBBFAC | Performed by: FAMILY MEDICINE

## 2024-05-24 PROCEDURE — 99214 OFFICE O/P EST MOD 30 MIN: CPT | Mod: S$PBB,,, | Performed by: FAMILY MEDICINE

## 2024-05-24 RX ORDER — PHENTERMINE AND TOPIRAMATE 11.25; 69 MG/1; MG/1
1 CAPSULE, EXTENDED RELEASE ORAL DAILY
Qty: 30 CAPSULE | Refills: 1 | Status: SHIPPED | OUTPATIENT
Start: 2024-05-24

## 2024-05-24 NOTE — PROGRESS NOTES
"Subjective     Patient ID: Tova Muñoz is a 59 y.o. female.    Chief Complaint: Establish Care (Follow up)     had heart cath this am.   Starting weight 191.    Weight stable from previous visits. Pt has lost 5 more pounds.     Overall, pt reports doing well.     HA's stable. Initially on topamax for preventative.   She had added phentermine---on qysmia now (mid dosage).  No paresthesias. Taste is good. Rare word "forgetting." Mood stable. No cp/sob/palpitations. BP good.     She likes current dosage - was able to lose 30 lb; > 10% tbw. Pt goal is in 150s.     Nutrition: doing well; has improved from baseline but portion sizes are smaller. Good protein in am/lunch/lesser meat at night    Exercise: light; she plans to add strength training    Sleep: okay/no worsening from medication      Medication not covered via -excluded    HPI       Objective     PAST MEDICAL HISTORY:  Past Medical History:   Diagnosis Date    Allergy     BCC (basal cell carcinoma of skin)     nose    Colon polyp     Migraine headache     Other hyperlipidemia 2024    Sinusitis          PAST SURGICAL HISTORY:  Past Surgical History:   Procedure Laterality Date    AUGMENTATION OF BREAST          BAND HEMORRHOIDECTOMY      BASAL CELL CARCINOMA EXCISION  2015    breast augumentation      BREAST SURGERY Bilateral     saline implants     SECTION      x2    COLONOSCOPY N/A 2016    Procedure: COLONOSCOPY;  Surgeon: Dali Rodriguez MD;  Location: [facility] ENDO;  Service: Endoscopy;  Laterality: N/A;    COLONOSCOPY N/A 08/15/2022    Procedure: COLONOSCOPY;  Surgeon: Shayy William MD;  Location: [facility] ENDO;  Service: Endoscopy;  Laterality: N/A;    EYE SURGERY      Lasik    LASIX      SKIN BIOPSY      TUBAL LIGATION         FAMILY HISTORY:  Family History   Problem Relation Name Age of Onset    Asthma Mother Tavia     Diabetes Mother Tavia     Emphysema Mother Tavia     Hypertension Mother Tavia     Basal " cell carcinoma Mother Tavia     Melanoma Mother Tavia     Arthritis Mother Tavia     Cancer Mother Tavia         Basal cell and a melanoma    COPD Mother Tavia     Asthma Sister Beatris     Cancer Sister Beatris         Basal Cell    Basal cell carcinoma Sister Beatris     Vulvar Cancer Sister Beatris     Heart disease Father Chaparro 75        CHF    Cancer Father Chaparro         Lung Cancer    COPD Father Chaparro     Cancer Paternal Grandmother Jessica         Uterine    Ovarian cancer Paternal Grandmother Jessica     Diabetes Maternal Grandfather Reagan     Arthritis Maternal Grandmother Florecita     Miscarriages / Stillbirths Maternal Grandmother Florecita     Arthritis Maternal Aunt Humberto     Diabetes Maternal Aunt Humberto     Cancer Sister Juanis         Vulvar Cancer (Squamous Cell)    Depression Sister Charity     Diabetes Maternal Uncle lBu     Eczema Neg Hx      Lupus Neg Hx      Psoriasis Neg Hx            SOCIAL HISTORY:  Social History     Social History Narrative    Not on file       MEDICATIONS:  Medications have been reviewed.    ALLERGIES:  Allergies have been reviewed.    Vitals:    05/24/24 0750   BP: 122/82   Pulse: 67   Resp: 18   Temp: 97.6 °F (36.4 °C)     Wt Readings from Last 10 Encounters:   05/24/24 74.3 kg (163 lb 12.8 oz)   05/09/24 76.3 kg (168 lb 3.4 oz)   03/28/24 76.3 kg (168 lb 3.4 oz)   03/08/24 75.7 kg (166 lb 14.2 oz)   02/29/24 77 kg (169 lb 13.8 oz)   02/08/24 75.9 kg (167 lb 5.3 oz)   02/07/24 75.9 kg (167 lb 5.3 oz)   12/22/23 77.1 kg (169 lb 15.6 oz)   10/26/23 78.2 kg (172 lb 6.4 oz)   10/06/23 79.4 kg (175 lb 0.7 oz)       Lab Results   Component Value Date    WBC 3.15 (L) 02/29/2024    HGB 12.8 02/29/2024    HCT 39.7 02/29/2024     02/29/2024    CHOL 174 05/02/2024    TRIG 43 05/02/2024    HDL 63 05/02/2024    ALT 25 05/02/2024    AST 22 05/02/2024     05/02/2024    K 3.8 05/02/2024     05/02/2024    CREATININE 0.8 05/02/2024    BUN 21 (H) 05/02/2024    CO2 25 05/02/2024    TSH  2.098 02/29/2024    HGBA1C 5.5 02/29/2024       Review of Systems   Constitutional:  Negative for activity change, appetite change, fatigue and fever.   HENT:  Negative for mouth dryness and goiter.    Eyes:  Negative for visual disturbance.   Respiratory:  Negative for apnea, cough, chest tightness and shortness of breath.    Cardiovascular:  Negative for chest pain, palpitations and leg swelling.   Gastrointestinal:  Negative for abdominal pain, constipation, diarrhea, nausea, vomiting and reflux.   Endocrine: Negative for cold intolerance, heat intolerance, polydipsia, polyphagia and polyuria.   Genitourinary:  Negative for frequency and menstrual problem.   Musculoskeletal:  Negative for arthralgias and myalgias.   Integumentary:  Negative for color change and rash.   Neurological:  Negative for dizziness, tremors, seizures, syncope, weakness, numbness and headaches.   Psychiatric/Behavioral:  Negative for agitation, decreased concentration, dysphoric mood, self-injury, sleep disturbance and suicidal ideas. The patient is not nervous/anxious and is not hyperactive.        Physical Exam  Vitals and nursing note reviewed.   Constitutional:       General: She is not in acute distress.  HENT:      Head: Normocephalic and atraumatic.      Mouth/Throat:      Pharynx: Oropharynx is clear.   Eyes:      General: No scleral icterus.     Pupils: Pupils are equal, round, and reactive to light.   Neck:      Comments: No TM  Cardiovascular:      Rate and Rhythm: Normal rate and regular rhythm.      Pulses: Normal pulses.      Heart sounds: Normal heart sounds. No murmur heard.     No friction rub. No gallop.   Pulmonary:      Effort: Pulmonary effort is normal. No respiratory distress.      Breath sounds: Normal breath sounds. No wheezing, rhonchi or rales.   Abdominal:      General: Bowel sounds are normal. There is no distension.      Palpations: Abdomen is soft.      Tenderness: There is no abdominal tenderness.    Musculoskeletal:         General: No swelling.      Cervical back: Normal range of motion and neck supple. No tenderness.   Lymphadenopathy:      Cervical: No cervical adenopathy.   Skin:     General: Skin is warm.      Findings: No erythema or rash.   Neurological:      Mental Status: She is alert and oriented to person, place, and time.   Psychiatric:         Mood and Affect: Mood normal.         Behavior: Behavior normal.              Assessment and Plan     1. Other hyperlipidemia    2. Class 1 obesity due to excess calories with serious comorbidity and body mass index (BMI) of 30.0 to 30.9 in adult    3. Chronic nonintractable headache, unspecified headache type    4. NIKOLAS (generalized anxiety disorder)      Other hyperlipidemia  Comments:  chronic/stable    Class 1 obesity due to excess calories with serious comorbidity and body mass index (BMI) of 30.0 to 30.9 in adult  -     phentermine-topiramate (QSYMIA) 11.25-69 mg CM24; Take 1 capsule by mouth once daily.  Dispense: 30 capsule; Refill: 1  Pt has lost approximately 30 lb; > 10 % tbw and maintained; doing well at current dosage of medication  Reviewed risks/benefits/se's; she is tolerating   Is paying out of pocket; will try to get PA with  once more      Chronic nonintractable headache, unspecified headache type  Comments:  chronic/stable    NIKOLAS (generalized anxiety disorder)  Comments:  chronic/stable          reviewed  Pt due for refill soon --wants to send to retail pharmacy  Will try again for PA  Pt has lost/maintained approximately     Pt made aware I will be leaving [facility] in June. She has established pcp so have advised her f/u with them this summer.     Will message Dr Lopez regarding future refills  Pt asked to make appt    Also reviewed potential external community options per her request.     Follow up if symptoms worsen or fail to improve.

## 2024-05-27 ENCOUNTER — TELEPHONE (OUTPATIENT)
Dept: PRIMARY CARE CLINIC | Facility: CLINIC | Age: 59
End: 2024-05-27
Payer: OTHER GOVERNMENT

## 2024-05-27 ENCOUNTER — PATIENT MESSAGE (OUTPATIENT)
Dept: PRIMARY CARE CLINIC | Facility: CLINIC | Age: 59
End: 2024-05-27
Payer: OTHER GOVERNMENT

## 2024-05-27 NOTE — TELEPHONE ENCOUNTER
----- Message from Oumou Lopez MD sent at 5/27/2024  9:22 AM CDT -----  Of course! I'll be happy to continue and follow it for her. What frequency of visits were you doing with her for this medication?     Oumou Lopez MD  ----- Message -----  From: Sharon Cruz MD  Sent: 5/24/2024  10:43 AM CDT  To: Oumou Lopez MD    Collette,     Ms Bernardo has lost > 10% tbw on qsymia and maintained close to 1 year. She is tolerating well. (Was originally on topamax for HA, changed to qsymia). I saw her today and refilled with one additional refill. Would you feel comfortable seeing her for this? If not she will likely make appt externally but there may be some insurance coverage exclusions.     Let me know what you think,     Davian

## 2024-06-03 ENCOUNTER — TELEPHONE (OUTPATIENT)
Dept: PRIMARY CARE CLINIC | Facility: CLINIC | Age: 59
End: 2024-06-03
Payer: OTHER GOVERNMENT

## 2024-06-03 NOTE — TELEPHONE ENCOUNTER
----- Message from Sharon Cruz MD sent at 5/27/2024 11:01 AM CDT -----  I was doing every 2 months with  having her send a mychart update monthly with bp, weight and if any issues. She has done very well so it is not unreasonable to go to every 3 months. She is paying out of pocket so we are still trying to convince  to cover. She could also do a quick nurse visit from time to time if needed.     As of now she has not had any significiant se's and no hx of glaucoma, renal stones, cad etc    Thanks!  ----- Message -----  From: Oumou Lopez MD  Sent: 5/27/2024   9:23 AM CDT  To: Sharon Cruz MD    Of course! I'll be happy to continue and follow it for her. What frequency of visits were you doing with her for this medication?     Oumou Lopez MD  ----- Message -----  From: Sharon Cruz MD  Sent: 5/24/2024  10:43 AM CDT  To: Oumou Lopez MD    Collette,     Ms Bernardo has lost > 10% tbw on qsymia and maintained close to 1 year. She is tolerating well. (Was originally on topamax for HA, changed to qsymia). I saw her today and refilled with one additional refill. Would you feel comfortable seeing her for this? If not she will likely make appt externally but there may be some insurance coverage exclusions.     Let me know what you thinkDavian

## 2024-06-03 NOTE — TELEPHONE ENCOUNTER
Pt can be set up to do video visit for medication for weight loss of qsymia with Dr. Lopez 3 mo out from her last Rx / appt with Dr. Cruz

## 2024-07-06 DIAGNOSIS — E66.09 CLASS 1 OBESITY DUE TO EXCESS CALORIES WITH SERIOUS COMORBIDITY AND BODY MASS INDEX (BMI) OF 30.0 TO 30.9 IN ADULT: ICD-10-CM

## 2024-07-06 NOTE — TELEPHONE ENCOUNTER
No care due was identified.  [facility] Embedded Care Due Messages. Reference number: [id number].   7/06/2024 8:41:21 AM CDT

## 2024-07-18 RX ORDER — PHENTERMINE AND TOPIRAMATE 11.25; 69 MG/1; MG/1
1 CAPSULE, EXTENDED RELEASE ORAL DAILY
Qty: 30 CAPSULE | Refills: 1 | Status: SHIPPED | OUTPATIENT
Start: 2024-07-18

## 2024-07-31 ENCOUNTER — OFFICE VISIT (OUTPATIENT)
Dept: PRIMARY CARE CLINIC | Facility: CLINIC | Age: 59
End: 2024-07-31
Payer: OTHER GOVERNMENT

## 2024-07-31 VITALS — WEIGHT: 160 LBS | HEIGHT: 61 IN | BODY MASS INDEX: 30.21 KG/M2

## 2024-07-31 DIAGNOSIS — Z12.83 SKIN CANCER SCREENING: ICD-10-CM

## 2024-07-31 DIAGNOSIS — G89.29 CHRONIC NONINTRACTABLE HEADACHE, UNSPECIFIED HEADACHE TYPE: ICD-10-CM

## 2024-07-31 DIAGNOSIS — L98.9 SKIN LESIONS: ICD-10-CM

## 2024-07-31 DIAGNOSIS — E78.49 OTHER HYPERLIPIDEMIA: Primary | ICD-10-CM

## 2024-07-31 DIAGNOSIS — R51.9 CHRONIC NONINTRACTABLE HEADACHE, UNSPECIFIED HEADACHE TYPE: ICD-10-CM

## 2024-07-31 DIAGNOSIS — F41.1 GAD (GENERALIZED ANXIETY DISORDER): ICD-10-CM

## 2024-07-31 PROCEDURE — G2211 COMPLEX E/M VISIT ADD ON: HCPCS | Mod: 95,,, | Performed by: FAMILY MEDICINE

## 2024-07-31 PROCEDURE — 99214 OFFICE O/P EST MOD 30 MIN: CPT | Mod: 95,,, | Performed by: FAMILY MEDICINE

## 2024-07-31 NOTE — PROGRESS NOTES
Chief Complaint  Chief Complaint   Patient presents with    Weight Loss     Patient would like to discuss weight loss       HPI  Tova Muñoz is a 59 y.o. female with multiple medical diagnoses as listed in the medical history and problem list that presents for  virtual visit.  Their last appointment 2/29/2024.      History of Present Illness    Patient presents today for follow up on weight loss management.    She is currently in her fourth month of Qysmia (phentermine/topiramate) for weight loss. She reports a plateau in weight loss, partially attributing it to insufficient water intake due to a busy work schedule. She notes weekly weight fluctuations. The medication has been helpful in managing her appetite and maintaining her weight. This is the first weight loss intervention that has worked for her without concerns about rapid weight regain upon discontinuation. She pays $240 per month out of pocket as her insurance does not cover it. She denies significant side effects and acknowledges the need to increase water intake and exercise for better results.    She reports long work hours limiting her exercise time, typically up at 5:00 AM, working from 7:30 AM to 7:30 PM, and in bed by 8:30 PM. She is considering incorporating 10-minute daily resistance band exercises for Pilates-style movements, either at work or before bed.    Current medications include:  - Atorvastatin 20 mg for cholesterol  - Celexa 20 mg for mood and anxiety (reported effective)  - Maxalt for migraines (rarely used)  - Treximet for migraines  - Tylenol Arthritis for joint pain (neck and knees)  - Qysmia (phentermine/topiramate) for weight loss  She reports using Axert for a recent migraine. She attributes improved migraine frequency to the topiramate component in her weight loss medication.    She has a history of migraines, cholesterol issues, and anxiety. Recent lab results from May show improvement in cholesterol numbers. Her mood and  "anxiety are well-controlled on current medication.    She reports arthritic pain, primarily affecting her neck and knees, with neck pain described as "really bad lately". She attributes these symptoms to aging and manages with Tylenol Arthritis, which has been sufficient for pain relief.    She is eligible for RSV and shingles vaccines. She declines flu and COVID-19 vaccines. She has been advised to be cautious due to a recent surge in COVID-19 cases in the area, particularly in [place], and to take a home COVID test if she experiences any sinus-like symptoms or headaches.    She reports needing a follow-up visit with Dr. Pena for a skin check, mentioning a few spots that require exam.    She previously participated in the [employer] program for weight management and used Topamax for migraine prevention. She finds her current combination medication more cost-effective than previous treatments.         Pmh, Psh, Family Hx, Social Hx updated in Epic Tabs today.     Review of Systems   Constitutional:  Negative for activity change and unexpected weight change.   HENT:  Negative for hearing loss, rhinorrhea and trouble swallowing.    Eyes:  Negative for discharge and visual disturbance.   Respiratory:  Negative for chest tightness and wheezing.    Cardiovascular:  Negative for chest pain and palpitations.   Gastrointestinal:  Negative for blood in stool, constipation, diarrhea and vomiting.   Endocrine: Negative for polydipsia and polyuria.   Genitourinary:  Negative for difficulty urinating, dysuria, hematuria and menstrual problem.   Musculoskeletal:  Negative for arthralgias, joint swelling and neck pain.   Neurological:  Negative for weakness and headaches.   Psychiatric/Behavioral:  Negative for confusion and dysphoric mood.            Physical Exam  Vitals reviewed.   Constitutional:       General: She is awake. She is not in acute distress.     Appearance: Normal appearance. She is well-developed and well-groomed. " She is obese. She is not ill-appearing.   HENT:      Head: Normocephalic and atraumatic.      Right Ear: External ear normal.      Left Ear: External ear normal.      Nose: Nose normal.      Mouth/Throat:      Lips: Pink.   Eyes:      Conjunctiva/sclera: Conjunctivae normal.   Pulmonary:      Effort: Pulmonary effort is normal.   Neurological:      Mental Status: She is alert.   Psychiatric:         Attention and Perception: Attention and perception normal. She is attentive.         Mood and Affect: Mood and affect normal. Mood is not anxious or depressed. Affect is not labile, blunt, angry or inappropriate.         Speech: Speech normal. She is communicative. Speech is not rapid and pressured, delayed, slurred or tangential.         Behavior: Behavior normal. Behavior is not agitated, slowed, aggressive, withdrawn, hyperactive or combative. Behavior is cooperative.         Thought Content: Thought content normal. Thought content is not paranoid or delusional. Thought content does not include homicidal or suicidal ideation. Thought content does not include homicidal or suicidal plan.         Cognition and Memory: Cognition and memory normal. Memory is not impaired. She does not exhibit impaired recent memory or impaired remote memory.         Judgment: Judgment normal. Judgment is not impulsive or inappropriate.       Physical Exam                ASSESSMENT/PLAN:  1. Other hyperlipidemia    2. Chronic nonintractable headache, unspecified headache type  Overview:  going to start clonidine 0.1 mg at night to help with bp, sleep and ha.       3. NIKOLAS (generalized anxiety disorder)    4. Skin lesions  -     Ambulatory referral/consult to Dermatology; Future; Expected date: 08/07/2024    5. Skin cancer screening  -     Ambulatory referral/consult to Dermatology; Future; Expected date: 08/07/2024      Assessment & Plan    WEIGHT MANAGEMENT:  - Continued weight loss management with Qysmia, no new Rx's given today. Already  has refills of (phentermine/topiramate combination).  - Noted plateau in weight loss, likely due to insufficient water intake and lack of exercise.  - Explained the dual action of medication, phentermine for appetite suppression and topiramate for migraine prevention.  - Incorporate resistance band exercises for 10 minutes daily, focusing on thigh area.  - Increase water intake.    HYPERLIPIDEMIA:  - Acknowledged patient's improved cholesterol levels with atorvastatin 20mg.  - Continued atorvastatin 20mg for cholesterol management.  MIGRAINES:  - Noted significant reduction in migraines, potentially due to topiramate component of Glycemia.  - Continued Maxalt (rizatriptan) as needed for migraines.  - Continued Treximet as needed for migraines.  RSV VACCINATION:  - Considered RSV vaccine eligibility due to patient's age (60+).  - Informed about new RSV vaccine eligibility for adults aged 60 and above.  COVID-19:  - Advised about recent COVID-19 surge in [place] and its initial presentation similar to sinus infection.  - Consider using home COVID test if experiencing sinus-like symptoms or headache.  MOOD AND ANXIETY:  - Continued Celexa 20mg for mood and anxiety.  DERMATOLOGY REFERRAL:  - Referred to Dr. Pena for dermatology follow-up and skin check.  FOLLOW UP:  - Follow up in 2-3 months (around September-October) for medication review and refill.  - Patient may see either the doctor or nurse practitioner Buck Vences for the follow-up visit, must be in person visit.        Mammo Digital Screening Bilat w/ Jonny  Narrative: Result:  Mammo Digital Screening Bilat w/ Jonny    History:  Patient is 59 y.o. and is seen for a screening mammogram.    Films Compared:  Prior images (if available) were compared.     Findings:  This procedure was performed using tomosynthesis.   Computer-aided detection was utilized in the interpretation of this   examination.    The breasts have scattered areas of fibroglandular density. There  is no   evidence of suspicious masses, microcalcifications or architectural   distortion.  Impression:    No mammographic evidence of malignancy.    BI-RADS Category 1: Negative    Recommendation:  Routine screening mammogram in 1 year is recommended.    Your estimated lifetime risk of breast cancer (to age 85) based on   Tyrer-Cuzick risk assessment model is 4.94%.  According to the American   Cancer Society, patients with a lifetime breast cancer risk of 20% or   higher might benefit from supplemental screening tests, such as screening   breast MRI.    Lab Results   Component Value Date    WBC 3.15 (L) 02/29/2024    HGB 12.8 02/29/2024    HCT 39.7 02/29/2024     02/29/2024    CHOL 174 05/02/2024    TRIG 43 05/02/2024    HDL 63 05/02/2024    ALT 25 05/02/2024    AST 22 05/02/2024     05/02/2024    K 3.8 05/02/2024     05/02/2024    CREATININE 0.8 05/02/2024    BUN 21 (H) 05/02/2024    CO2 25 05/02/2024    TSH 2.098 02/29/2024    HGBA1C 5.5 02/29/2024       Follow-up: Follow up in about 2 months (around 9/30/2024) for f/u OV Dr. Lopez, f/u OV YURI Rucker 2-3 mo visit .    ______________________________________________________________________    Face to Face time with patient:  7:00 AM CDT - 725am     The patient location is:  home  The chief complaint leading to consultation is: noted   Visit type: Virtual visit with synchronous audio and video   Each patient to whom he or she provides medical services by telemedicine is:  (1) informed of the relationship between the physician and patient and the respective role of any other health care provider with respect to management of the patient; and (2) notified that he or she may decline to receive medical services by telemedicine and may withdraw from such care at any time.    I spent a total of    30   minutes face to face and non-face to face on the date of this visit.This includes time preparing to see the patient (eg, review of tests,  notes), obtaining and/or reviewing additional history from an independent historian and/or outside medical records, documenting clinical information in the electronic health record, independently interpreting results and/or communicating results to the patient/family/caregiver, or care coordinator.  Visit today included increased complexity associated with the care of the episodic problem addressed and managing the longitudinal care of the patient due to the serious and/or complex managed problem(s).    This note was generated with the assistance of ambient listening technology. Verbal consent was obtained by the patient and accompanying visitor(s) for the recording of patient appointment to facilitate this note. I attest to having reviewed and edited the generated note for accuracy, though some syntax or spelling errors may persist. Please contact the author of this note for any clarification.

## 2024-08-09 ENCOUNTER — OFFICE VISIT (OUTPATIENT)
Dept: DERMATOLOGY | Facility: CLINIC | Age: 59
End: 2024-08-09
Payer: OTHER GOVERNMENT

## 2024-08-09 VITALS — BODY MASS INDEX: 30.22 KG/M2 | WEIGHT: 160.06 LBS | HEIGHT: 61 IN

## 2024-08-09 DIAGNOSIS — L60.3 BRITTLE NAILS: Primary | ICD-10-CM

## 2024-08-09 DIAGNOSIS — L57.0 ACTINIC KERATOSES: ICD-10-CM

## 2024-08-09 DIAGNOSIS — Z85.828 HISTORY OF NONMELANOMA SKIN CANCER: ICD-10-CM

## 2024-08-09 DIAGNOSIS — L82.1 SEBORRHEIC KERATOSES: ICD-10-CM

## 2024-08-09 DIAGNOSIS — L98.9 SKIN LESIONS: ICD-10-CM

## 2024-08-09 DIAGNOSIS — Z12.83 SKIN CANCER SCREENING: ICD-10-CM

## 2024-08-09 PROCEDURE — 99213 OFFICE O/P EST LOW 20 MIN: CPT | Mod: PBBFAC | Performed by: STUDENT IN AN ORGANIZED HEALTH CARE EDUCATION/TRAINING PROGRAM

## 2024-08-09 PROCEDURE — 99999 PR PBB SHADOW E&M-EST. PATIENT-LVL III: CPT | Mod: PBBFAC,,, | Performed by: STUDENT IN AN ORGANIZED HEALTH CARE EDUCATION/TRAINING PROGRAM

## 2024-08-09 RX ORDER — MOMETASONE FUROATE 1 MG/ML
SOLUTION TOPICAL DAILY
Qty: 60 ML | Refills: 3 | Status: SHIPPED | OUTPATIENT
Start: 2024-08-09

## 2024-10-01 ENCOUNTER — OFFICE VISIT (OUTPATIENT)
Dept: PRIMARY CARE CLINIC | Facility: CLINIC | Age: 59
End: 2024-10-01
Payer: OTHER GOVERNMENT

## 2024-10-01 VITALS
OXYGEN SATURATION: 98 % | HEART RATE: 86 BPM | SYSTOLIC BLOOD PRESSURE: 124 MMHG | HEIGHT: 61 IN | TEMPERATURE: 97 F | BODY MASS INDEX: 29.97 KG/M2 | WEIGHT: 158.75 LBS | DIASTOLIC BLOOD PRESSURE: 72 MMHG

## 2024-10-01 DIAGNOSIS — E66.3 OVERWEIGHT (BMI 25.0-29.9): ICD-10-CM

## 2024-10-01 DIAGNOSIS — E78.49 OTHER HYPERLIPIDEMIA: ICD-10-CM

## 2024-10-01 DIAGNOSIS — R03.0 ELEVATED BP WITHOUT DIAGNOSIS OF HYPERTENSION: ICD-10-CM

## 2024-10-01 DIAGNOSIS — F41.1 GAD (GENERALIZED ANXIETY DISORDER): ICD-10-CM

## 2024-10-01 DIAGNOSIS — R51.9 CHRONIC NONINTRACTABLE HEADACHE, UNSPECIFIED HEADACHE TYPE: Primary | ICD-10-CM

## 2024-10-01 DIAGNOSIS — G89.29 CHRONIC NONINTRACTABLE HEADACHE, UNSPECIFIED HEADACHE TYPE: Primary | ICD-10-CM

## 2024-10-01 PROBLEM — E66.09 CLASS 1 OBESITY DUE TO EXCESS CALORIES WITH SERIOUS COMORBIDITY AND BODY MASS INDEX (BMI) OF 30.0 TO 30.9 IN ADULT: Status: RESOLVED | Noted: 2017-11-08 | Resolved: 2024-10-01

## 2024-10-01 PROBLEM — E66.811 CLASS 1 OBESITY DUE TO EXCESS CALORIES WITH SERIOUS COMORBIDITY AND BODY MASS INDEX (BMI) OF 30.0 TO 30.9 IN ADULT: Status: RESOLVED | Noted: 2017-11-08 | Resolved: 2024-10-01

## 2024-10-01 PROCEDURE — 99999 PR PBB SHADOW E&M-EST. PATIENT-LVL III: CPT | Mod: PBBFAC,,, | Performed by: NURSE PRACTITIONER

## 2024-10-01 PROCEDURE — 99213 OFFICE O/P EST LOW 20 MIN: CPT | Mod: PBBFAC,PN | Performed by: NURSE PRACTITIONER

## 2024-10-01 PROCEDURE — 99214 OFFICE O/P EST MOD 30 MIN: CPT | Mod: S$PBB,,, | Performed by: NURSE PRACTITIONER

## 2024-10-01 PROCEDURE — G2211 COMPLEX E/M VISIT ADD ON: HCPCS | Mod: S$PBB,,, | Performed by: NURSE PRACTITIONER

## 2024-10-01 RX ORDER — PHENTERMINE AND TOPIRAMATE 11.25; 69 MG/1; MG/1
1 CAPSULE, EXTENDED RELEASE ORAL DAILY
Qty: 30 CAPSULE | Refills: 1 | Status: SHIPPED | OUTPATIENT
Start: 2024-10-01

## 2024-10-01 NOTE — PROGRESS NOTES
Chief Complaint  Chief Complaint   Patient presents with    Follow-up     2 month follow up        History of Present Illness    Ms. Muñoz presents today for follow up.    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 Her BMI has decreased from 32 to 29.9, moving her from the obese to overweight category. She is aware of the goal to reach a BMI of 25-25.4.    She has a history of chronic non-intractable headaches. Recently, she has been experiencing tension-type headaches rather than migraines, which have been manageable with Tylenol and ibuprofen. She denies needing to use her abortive medication for severe headaches. Her current regimen of Qulipta (atogepant) and Topamax has been effective in keeping her headaches under control.    She reports generalized anxiety, which has been effectively managed with Celexa. She notes increased stress in the past month has tested the medication's effectiveness, but denies needing to use her as-needed anxiety medication during this period.    She reports arthritis in her neck, using naproxen daily as needed for pain management. Sometimes, she opts for Tylenol Arthritis instead, depending on symptom severity and availability. She notes experiencing neck stiffness today.    She continues atorvastatin for cholesterol management, reporting compliance and effectiveness. She requests medication refills before departing for a trip tomorrow evening, to be sent to [facility] on [address].      ROS:  General: -fever, -chills, -fatigue, -weight gain, -weight loss  Eyes: -vision changes, -redness, -discharge  ENT: -ear pain, -nasal congestion, -sore throat  Cardiovascular: -chest pain, -palpitations, -lower extremity edema  Respiratory: -cough, -shortness of breath  Gastrointestinal: -abdominal pain, -nausea, -vomiting, -diarrhea, -constipation, -blood in stool  Genitourinary: -dysuria,  -hematuria, -frequency  Musculoskeletal: -joint pain, -muscle pain  Skin: -rash, -lesion  Neurological: +headache, -dizziness, -numbness, -tingling  Psychiatric: +anxiety, -depression, -sleep difficulty            Wt Readings from Last 10 Encounters:   10/01/24 72 kg (158 lb 11.7 oz)   24 72.6 kg (160 lb 0.9 oz)   24 72.6 kg (160 lb)   24 74.3 kg (163 lb 12.8 oz)   24 76.3 kg (168 lb 3.4 oz)   24 76.3 kg (168 lb 3.4 oz)   24 75.7 kg (166 lb 14.2 oz)   24 77 kg (169 lb 13.8 oz)   24 75.9 kg (167 lb 5.3 oz)   24 75.9 kg (167 lb 5.3 oz)      PAST MEDICAL HISTORY:  Past Medical History:   Diagnosis Date    Allergy     BCC (basal cell carcinoma of skin)     nose    Colon polyp     Migraine headache     Other hyperlipidemia 2024    Sinusitis        PAST SURGICAL HISTORY:  Past Surgical History:   Procedure Laterality Date    AUGMENTATION OF BREAST          BAND HEMORRHOIDECTOMY      BASAL CELL CARCINOMA EXCISION  2015    breast augumentation      BREAST SURGERY Bilateral 1989    saline implants     SECTION      x2    COLONOSCOPY N/A 2016    Procedure: COLONOSCOPY;  Surgeon: Dali Rodriguez MD;  Location: [facility];  Service: Endoscopy;  Laterality: N/A;    COLONOSCOPY N/A 08/15/2022    Procedure: COLONOSCOPY;  Surgeon: Shayy William MD;  Location: [facility];  Service: Endoscopy;  Laterality: N/A;    EYE SURGERY      Lasik    LASIX      SKIN BIOPSY      TUBAL LIGATION         SOCIAL HISTORY:  Social History     Socioeconomic History    Marital status:    Occupational History    Occupation: Insurance    Tobacco Use    Smoking status: Never     Passive exposure: Never    Smokeless tobacco: Never   Substance and Sexual Activity    Alcohol use: Yes     Alcohol/week: 2.0 - 3.0 standard drinks of alcohol     Types: 2 - 3 Cans of beer per week    Drug use: Never    Sexual activity: Yes     Partners: Male     Birth  control/protection: Post-menopausal, See Surgical Hx   Other Topics Concern    Are you pregnant or think you may be? No    Breast-feeding No     Social Drivers of Health     Financial Resource Strain: Low Risk  (4/6/2024)    Overall Financial Resource Strain (CARDIA)     Difficulty of Paying Living Expenses: Not hard at all   Food Insecurity: No Food Insecurity (4/6/2024)    Hunger Vital Sign     Worried About Running Out of Food in the Last Year: Never true     Ran Out of Food in the Last Year: Never true   Transportation Needs: No Transportation Needs (4/6/2024)    PRAPARE - Transportation     Lack of Transportation (Medical): No     Lack of Transportation (Non-Medical): No   Physical Activity: Insufficiently Active (4/6/2024)    Exercise Vital Sign     Days of Exercise per Week: 2 days     Minutes of Exercise per Session: 20 min   Stress: Stress Concern Present (4/6/2024)    [employer] [place] [employer] - Occupational Stress Questionnaire     Feeling of Stress : Rather much   Housing Stability: Low Risk  (4/6/2024)    Housing Stability Vital Sign     Unable to Pay for Housing in the Last Year: No     Number of Places Lived in the Last Year: 1     Unstable Housing in the Last Year: No       FAMILY HISTORY:  Family History   Problem Relation Name Age of Onset    Asthma Mother Tavia     Diabetes Mother Tavia     Emphysema Mother Tavia     Hypertension Mother Tavia     Basal cell carcinoma Mother Tavia     Melanoma Mother Tavia     Arthritis Mother Tavia     Cancer Mother Tavia         Basal cell and a melanoma    COPD Mother Tavia     Asthma Sister Beatris     Cancer Sister Beatris         Basal Cell    Basal cell carcinoma Sister Beatris     Vulvar Cancer Sister Beatris     Heart disease Father Chaparro 75        CHF    Cancer Father Chaparro         Lung Cancer    COPD Father Chaparro     Cancer Paternal Grandmother Jessica         Uterine    Ovarian cancer Paternal Grandmother Jessica     Diabetes Maternal  Grandfather Reagan     Arthritis Maternal Grandmother Florecita     Miscarriages / Stillbirths Maternal Grandmother Florecita     Arthritis Maternal Aunt Humberto     Diabetes Maternal Aunt Humberto     Cancer Sister Juanis         Vulvar Cancer (Squamous Cell)    Depression Sister Charity     Diabetes Maternal Uncle Blu     Eczema Neg Hx      Lupus Neg Hx      Psoriasis Neg Hx         ALLERGIES AND MEDICATIONS: updated and reviewed.  Review of patient's allergies indicates:   Allergen Reactions    Sulfa (sulfonamide antibiotics) Swelling     Current Outpatient Medications   Medication Sig Dispense Refill    atorvastatin (LIPITOR) 20 MG tablet Take 1 tablet (20 mg total) by mouth every evening. 90 tablet 3    citalopram (CELEXA) 20 MG tablet Take 1 tablet (20 mg total) by mouth once daily. 90 tablet 3    clobetasol 0.05% (TEMOVATE) 0.05 % Oint Apply topically 2 (two) times daily. To nail beds 60 g 0    mometasone (ELOCON) 0.1 % solution Apply topically once daily. 60 mL 3    naproxen (NAPROSYN) 500 MG tablet Take 1 tablet (500 mg total) by mouth 2 (two) times daily as needed (migraine). 30 tablet 3    phentermine-topiramate (QSYMIA) 11.25-69 mg CM24 Take 1 capsule by mouth once daily. 30 capsule 1    rizatriptan (MAXALT-MLT) 5 MG disintegrating tablet Take 1 tablet (5 mg total) by mouth as needed for Migraine. May repeat in 2 hours if needed 12 tablet 3    TREXIMET  mg Tab Take 1 tablet by mouth continuous prn.       No current facility-administered medications for this visit.           Physical Exam    Vitals: BMI: 29.9.  General: No acute distress. Well-developed. Well-nourished.  Head: Normocephalic. Atraumatic.  Eyes: EOMI. PERRLA. Conjunctivae non-injected. Sclerae anicteric.  Ears: Bilateral EACs clear. Bilateral TMs clear and intact.  Nose: Nares patent. Normal turbinates. No nasal discharge.  Mouth: Moist oral mucosa. Normal dentition.  Throat: No erythema. No exudate. Uvula midline.  Neck: Supple. Full ROM.  "Non-tender. No JVD. No carotid bruits. No thyromegaly. No lymphadenopathy.  Cardiovascular: Regular rate. Regular rhythm. No murmurs. No rubs. No gallops. Normal S1, S2. Peripheral pulses 2+ and symmetric.  Respiratory: Normal respiratory effort. Clear to auscultation bilaterally. No rales. No rhonchi. No wheezing.  Abdomen: Soft. Non-tender. Non-distended. Normal bowel sounds. Negative McBurney's. Negative Townsend's. No rebound. No guarding. No hepatosplenomegaly. No masses.  Musculoskeletal: No  obvious deformity. Normal muscle development. Normal muscle tone. FROM at all joints. No swelling. No tenderness.  Back: No CVA tenderness. No spinal deformity.  Extremities: No cyanosis. No clubbing. No upper extremity edema. No lower extremity edema.  Neurological: Alert & oriented x3. CN II-XII intact. Reflexes 2+ throughout. Strength 5/5 in all extremities. Sensation intact. No slurred speech. Normal gait.  Psychiatric: Normal mood. Normal affect. Good insight. Good judgment. No evidence of suicidal ideation. No evidence of homicidal ideation.  Skin: Warm. Dry. Intact. No rash. No ulcers. No suspicious lesions.        Vitals:    10/01/24 0940   BP: 124/72   Patient Position: Sitting   Pulse: 86   Temp: 96.9 °F (36.1 °C)   SpO2: 98%   Weight: 72 kg (158 lb 11.7 oz)   Height: 5' 1" (1.549 m)    Body mass index is 29.99 kg/m².  Weight: 72 kg (158 lb 11.7 oz)   Height: 5' 1" (154.9 cm)       Health Maintenance         Date Due Completion Date    Shingles Vaccine (1 of 2) Never done ---    RSV Vaccine (Age 60+ and Pregnant patients) (1 - Risk 60-74 years 1-dose series) Never done ---    Influenza Vaccine (1) 09/01/2024 2/16/2022 (Declined)    Override on 2/16/2022: Declined    Override on 12/11/2020: Declined    HIV Screening 02/16/2028 (Originally 1/12/1980) ---    Lipid Panel 05/02/2025 5/2/2024    Mammogram 05/09/2025 5/9/2024    Override on 3/24/2011: Done (NO EVEIDENCE OF MALIGNANCY. [facility]OM[facility]S [facility]ALTH[facility] REPORT - OUTSIDE " XRAY)    Colorectal Cancer Screening 08/15/2025 8/15/2022    TETANUS VACCINE 02/02/2026 2/2/2016    Hemoglobin A1c (Diabetic Prevention Screening) 02/28/2027 2/29/2024    Cervical Cancer Screening 04/10/2028 4/10/2023            Assessment & Plan    IMPRESSION:  Assessed patient's progress on Qsymia for weight management; noted BMI decrease from 32 to 29.9, moving patient from obese to overweight category  Evaluated effectiveness of current headache management regimen, including Topamax for long-term treatment and Maxalt for abortive measures  Considered patient's use of naproxen for arthritis pain management, noting current usage is appropriate but monitoring for potential increase in frequency      ARTHRITIS:  - Informed patient about OTC diclofenac gel (Voltaren) as a topical alternative to oral NSAIDs for arthritis pain management.  - Explained potential stomach irritation risks associated with frequent use of oral NSAIDs like Mobic, ibuprofen, and naproxen.  - Continued naproxen as needed for arthritis pain, with instruction to monitor frequency of use.    WEIGHT MANAGEMENT:  - Continued Qsymia for weight management.  - Ms. Muñoz to continue efforts to increase movement during work hours.    HEADACHE:  - Continued Topamax for long-term headache prevention.  - Continued Maxalt for abortive treatment of severe headaches.    HYPERLIPIDEMIA:  - Continued atorvastatin for cholesterol management.    GENERALIZED ANXIETY:  - Continued Celexa for generalized anxiety.    THYROID DISORDER:  - Free T4 lab test ordered.    FOLLOW UP:  - Follow up at end of January for lab appt  - OV with Dr. Lopez in February.       Problem List Items Addressed This Visit          Neuro    Chronic nonintractable headache - Primary    Overview     going to start clonidine 0.1 mg at night to help with bp, sleep and ha.          Relevant Orders    CBC Auto Differential    Comprehensive Metabolic Panel       Psychiatric    NIKOLAS (generalized  anxiety disorder)    Relevant Orders    Comprehensive Metabolic Panel       Cardiac/Vascular    Elevated BP without diagnosis of hypertension    Overview     going to start clonidine 0.1 mg at night to help with bp, sleep and ha.          Other hyperlipidemia    Relevant Orders    Comprehensive Metabolic Panel    Lipid Panel     Other Visit Diagnoses       Overweight (BMI 25.0-29.9)        Relevant Medications    phentermine-topiramate (QSYMIA) 11.25-69 mg CM24    Other Relevant Orders    CBC Auto Differential    Comprehensive Metabolic Panel    Lipid Panel    Hemoglobin A1C    TSH    T4, FREE    Insulin, random              Healthcare Maintenance: Deferred at this time.       20+ minutes of total time spent on the encounter, which includes face to face time and non-face to face time preparing to see the patient (eg, review of tests), Obtaining and/or reviewing separately obtained history, documenting clinical information in the electronic or other health record, independently interpreting results (not separately reported) and communicating results to the patient/family/caregiver, or Care coordination (not separately reported). Visit today included increased complexity associated with the care of the episodic problem addressed and managing the longitudinal care of the patient due to the serious and/or complex managed problem(s).        Sincerely,  Buck Vences NP

## 2024-11-19 ENCOUNTER — PATIENT MESSAGE (OUTPATIENT)
Dept: NEUROLOGY | Facility: CLINIC | Age: 59
End: 2024-11-19
Payer: OTHER GOVERNMENT

## 2024-11-21 DIAGNOSIS — E66.3 OVERWEIGHT (BMI 25.0-29.9): ICD-10-CM

## 2024-11-24 RX ORDER — PHENTERMINE AND TOPIRAMATE 11.25; 69 MG/1; MG/1
1 CAPSULE, EXTENDED RELEASE ORAL DAILY
Qty: 30 CAPSULE | Refills: 1 | Status: SHIPPED | OUTPATIENT
Start: 2024-11-24

## 2024-12-20 ENCOUNTER — OFFICE VISIT (OUTPATIENT)
Dept: URGENT CARE | Facility: CLINIC | Age: 59
End: 2024-12-20
Payer: OTHER GOVERNMENT

## 2024-12-20 VITALS
HEIGHT: 61 IN | HEART RATE: 82 BPM | BODY MASS INDEX: 31.63 KG/M2 | RESPIRATION RATE: 16 BRPM | WEIGHT: 167.56 LBS | SYSTOLIC BLOOD PRESSURE: 129 MMHG | TEMPERATURE: 98 F | OXYGEN SATURATION: 100 % | DIASTOLIC BLOOD PRESSURE: 66 MMHG

## 2024-12-20 DIAGNOSIS — Z20.828 EXPOSURE TO THE FLU: ICD-10-CM

## 2024-12-20 DIAGNOSIS — R05.9 COUGH, UNSPECIFIED TYPE: ICD-10-CM

## 2024-12-20 DIAGNOSIS — J10.1 INFLUENZA A: Primary | ICD-10-CM

## 2024-12-20 LAB
CTP QC/QA: YES
POC MOLECULAR INFLUENZA A AGN: POSITIVE
POC MOLECULAR INFLUENZA B AGN: ABNORMAL

## 2024-12-20 RX ORDER — OSELTAMIVIR PHOSPHATE 75 MG/1
75 CAPSULE ORAL 2 TIMES DAILY
Qty: 10 CAPSULE | Refills: 0 | Status: SHIPPED | OUTPATIENT
Start: 2024-12-20 | End: 2024-12-25

## 2024-12-20 RX ORDER — BALOXAVIR MARBOXIL 40 MG/1
40 TABLET, FILM COATED ORAL ONCE
Qty: 1 TABLET | Refills: 0 | Status: SHIPPED | OUTPATIENT
Start: 2024-12-20 | End: 2024-12-20

## 2024-12-20 RX ORDER — MONTELUKAST SODIUM 10 MG/1
10 TABLET ORAL NIGHTLY
Qty: 30 TABLET | Refills: 2 | Status: SHIPPED | OUTPATIENT
Start: 2024-12-20 | End: 2025-03-20

## 2024-12-20 RX ORDER — BENZONATATE 200 MG/1
200 CAPSULE ORAL 3 TIMES DAILY PRN
Qty: 30 CAPSULE | Refills: 0 | Status: SHIPPED | OUTPATIENT
Start: 2024-12-20

## 2024-12-20 NOTE — PATIENT INSTRUCTIONS
Flu Discharge Instructions  You have been diagnosed with Influenza.   You are contagious for 24 hours after you start the Tamilfu/Xofluza or 24 hours after your last fever, whichever happens last.  Please drink plenty of fluids.  Please get plenty of rest.  Please return here or go to the Emergency Department for any concerns or worsening of condition.  Tamiflu/Xofluza prescription has been discussed and if prescribed, please take to completion unless you cannot tolerate the side effects.   Take tylenol (acetominophen) for fever, chills or body aches every 4 hours. do not exceed 4000 mg/day.  Take Motrin (Ibuprofen) every 4 hours for fever, chills, pain or inflammation.  Use an antihistmine such as claritin or zyrtec to dry you out. Use pseudoephedrine (behind the counter) to decongest (beware this can raise your blood pressure). Use mucinex (guaifenisin) to break up mucous.    PLEASE READ YOUR DISCHARGE INSTRUCTIONS ENTIRELY AS IT CONTAINS IMPORTANT INFORMATION.    Please drink plenty of fluids.    Please get plenty of rest.    Please return here or go to the Emergency Department for any concerns or worsening of condition.    Please take an over the counter antihistamine medication (Allegra/Claritin/Zyrtec) of your choice as directed for allergy symptoms and/or runny nose and postnasal drip.    Try an over the counter decongestant for sinus pressure/ear pressure, congestion symptoms like Mucinex D or Sudafed or Phenylephrine. You buy this behind the pharmacy counter.    If you do have Hypertension or palpitations, it is safe to take Coricidin HBP for relief of sinus symptoms.    Tylenol or ibuprofen can also be used as directed for pain and fever unless you have an allergy to them or medical condition such as stomach ulcers, kidney or liver disease or blood thinners etc for which you should not be taking these type of medications.     Sore throat recommendations: Warm fluids, warm salt water gargles, throat  lozenges, tea, honey, soup, rest, hydration.    Use over the counter Flonase or Nasocort: one spray each nostril twice daily OR two sprays each nostril once daily until nares dry out, unless you have Glaucoma.   Can also supplement with nasal saline rinse.    Sinus rinses DO NOT USE TAP WATER, if you must, water must be at a rolling boil for 1 minute, let it cool, then use.  May use distilled water, or over the counter nasal saline rinses.  Cipriano's vapor rub in shower to help open nasal passages.  May use nasal gel to keep passages moisturized.  May use nasal saline sprays during the day for added relief of congestion.   For those who go to the gym, please do not use the sauna or steam room now to clear sinuses.    Cough     Rest and fluids are important  Can use honey with rolf to soothe your throat    Robitussin or Delsyum for cough suppressant for dry cough.    Mucinex DM or products containing Guaifenesin or Dextromethorphan for expectorant (wet cough).    Take prescription cough meds (pills) as prescribed; take prescription cough syrup at night as needed for cough.  Do not take both the prescribed cough pills and syrup at the same time or within 6 hours of each other.  Do not take the cough syrup with any other sedative medication as it can can cause drowsiness. Do not operate any heavy machinery, drink or drive while taking the cough syrup.     Please follow up with your primary care doctor or specialist in the next 48-72hrs as needed and if no improvement    If you smoke, please stop smoking.    Please return or see your primary care doctor if you develop new or worsening symptoms.     Lastly, good hand washing and cough hygiene (cough into your elbow) will help prevent the spread of the illness. A general rule is that you are no longer contagious once you have been without a fever for over 24 hours without requiring fever reducing medications.     Please arrange follow up with your primary medical clinic as  soon as possible. You must understand that you've received an Urgent Care treatment only and that you may be released before all of your medical problems are known or treated. You, the patient, will arrange for follow up as instructed. If your symptoms worsen or fail to improve you should go to the Emergency Room.

## 2024-12-20 NOTE — PROGRESS NOTES
"Subjective:      Patient ID: Tova Muñoz is a 59 y.o. female.    Vitals:  height is 5' 1" (1.549 m) and weight is 76 kg (167 lb 8.8 oz). Her oral temperature is 98.1 °F (36.7 °C). Her blood pressure is 129/66 and her pulse is 82. Her respiration is 16 and oxygen saturation is 100%.     Chief Complaint: Cough (Fever, headache)    Tova Muñoz is a 59 y.o. female who presents for cough which onset yesterday. Associated sxs include subjective fever, chills, HA, generalized myalgias, nasal congestion. Patient denies any SOB, CP, abd pain, n/v/d, rash, dizziness, or numbness/tingling. Prior Tx includes Tylenol, tessalon perles, and Singulair. (+) flu exposure.    Cough  This is a new problem. The current episode started yesterday. The problem has been unchanged. The problem occurs every few minutes. The cough is Non-productive. Associated symptoms include a fever, headaches, nasal congestion and postnasal drip. Pertinent negatives include no chest pain, chills, ear congestion, ear pain, heartburn, hemoptysis, myalgias, rash, rhinorrhea, sore throat, shortness of breath, sweats, weight loss or wheezing. Nothing aggravates the symptoms. Treatments tried: Tylenol Severe Cold,Tessalon Pearls, Singular, and ibuprofen. The treatment provided mild relief. There is no history of asthma, bronchiectasis, bronchitis, COPD, emphysema, environmental allergies or pneumonia.       Constitution: Positive for fever. Negative for appetite change, chills, sweating and fatigue.   HENT:  Positive for postnasal drip. Negative for ear pain, ear discharge, hearing loss, congestion, sinus pain, sinus pressure, sore throat and trouble swallowing.    Cardiovascular:  Negative for chest pain.   Respiratory:  Positive for cough. Negative for sputum production, bloody sputum, shortness of breath and wheezing.    Gastrointestinal:  Negative for abdominal pain, nausea, vomiting, diarrhea and heartburn.   Musculoskeletal:  Negative for muscle " ache.   Skin:  Negative for rash.   Allergic/Immunologic: Negative for environmental allergies.   Neurological:  Positive for headaches. Negative for dizziness, numbness and tingling.      Objective:     Physical Exam   Constitutional: She is oriented to person, place, and time. She appears well-developed. She is cooperative.  Non-toxic appearance. She does not appear ill. No distress.   HENT:   Head: Normocephalic and atraumatic.   Ears:   Right Ear: Hearing, tympanic membrane, external ear and ear canal normal.   Left Ear: Hearing, tympanic membrane, external ear and ear canal normal.   Nose: Nose normal. No mucosal edema or nasal deformity. No epistaxis. Right sinus exhibits no maxillary sinus tenderness and no frontal sinus tenderness. Left sinus exhibits no maxillary sinus tenderness and no frontal sinus tenderness.   Mouth/Throat: Uvula is midline, oropharynx is clear and moist and mucous membranes are normal. Mucous membranes are moist. No trismus in the jaw. Normal dentition. No uvula swelling. No oropharyngeal exudate, posterior oropharyngeal edema or posterior oropharyngeal erythema.   Eyes: Conjunctivae and lids are normal. No scleral icterus. Extraocular movement intact   Neck: Trachea normal and phonation normal. Neck supple. No edema present. No erythema present. No neck rigidity present.   Cardiovascular: Normal rate, regular rhythm, normal heart sounds and normal pulses.   Pulmonary/Chest: Effort normal and breath sounds normal. No stridor. No respiratory distress. She has no decreased breath sounds. She has no wheezes. She has no rhonchi. She has no rales.   Abdominal: Normal appearance.   Musculoskeletal: Normal range of motion.         General: No deformity. Normal range of motion.   Neurological: She is alert and oriented to person, place, and time. She exhibits normal muscle tone. Coordination normal.   Skin: Skin is warm, dry, intact, not diaphoretic and not pale.   Psychiatric: Her speech is  normal and behavior is normal. Judgment and thought content normal.   Nursing note and vitals reviewed.      Assessment:     1. Influenza A    2. Cough, unspecified type    3. Exposure to the flu        Results for orders placed or performed in visit on 12/20/24   POCT Influenza A/B MOLECULAR    Collection Time: 12/20/24  9:22 AM   Result Value Ref Range    POC Molecular Influenza A Ag Positive (A) Negative    POC Molecular Influenza B Ag Not Reported Negative     Acceptable Yes        Plan:       Influenza A  -     baloxavir marboxiL (XOFLUZA) 40 mg tablet; Take 1 tablet (40 mg total) by mouth once. for 1 dose  Dispense: 1 tablet; Refill: 0  -     oseltamivir (TAMIFLU) 75 MG capsule; Take 1 capsule (75 mg total) by mouth 2 (two) times daily. for 5 days  Dispense: 10 capsule; Refill: 0    Cough, unspecified type  -     POCT Influenza A/B MOLECULAR  -     benzonatate (TESSALON) 200 MG capsule; Take 1 capsule (200 mg total) by mouth 3 (three) times daily as needed for Cough.  Dispense: 30 capsule; Refill: 0    Exposure to the flu    Other orders  -     montelukast (SINGULAIR) 10 mg tablet; Take 1 tablet (10 mg total) by mouth every evening.  Dispense: 30 tablet; Refill: 2        Afebrile. VSS.  Discussed positive Influenza A results with patient.   Meds: xofluza, tessalon, and singulair sent to preferred pharmacy. Tamiflu sent by paper script if xofluza not available.   Discussed OTC meds to help with allergy symptoms.  Get plenty of rest and drink plenty of fluids.   Discussed that the patient is contagious for 24 hours after starting the Tamilfu/Xofluza or 24 hours after last fever, whichever happens last.   Advised patient to return here or go to the Emergency Department for any concerns or worsening of condition.  Tamiflu/Xofluza prescription has been discussed and if prescribed, please take to completion unless you cannot tolerate the side effects.   Advised patient to begin tylenol (acetominophen)  for fever, chills or body aches every 4 hours but not to exceed 4000 mg/day. Patient vitals stable.   Patient has no questions or concerns at this time, all questions were answered before discharge. A handout was given with discharge instructions.   Patient exits exam room in no acute distress.

## 2025-01-29 DIAGNOSIS — E66.3 OVERWEIGHT (BMI 25.0-29.9): ICD-10-CM

## 2025-01-29 NOTE — TELEPHONE ENCOUNTER
Care Due:                  Date            Visit Type   Department     Provider  --------------------------------------------------------------------------------                                ESTABLISHED                              PATIENT -    [facility]  Last Visit: 07-      [facility]           Oumou Lopez                               -                              PRIMARY      [facility]  Next Visit: 02-      CARE ([facility])   [facility]           Oumou Lopez                                                            Last  Test          Frequency    Reason                     Performed    Due Date  --------------------------------------------------------------------------------    CBC.........  12 months..  naproxen.................  02- 02-    Cr..........  12 months..  naproxen.................  05- 04-    [employer] Embedded Care Due Messages. Reference number: [id number].   1/29/2025 11:54:17 AM CST

## 2025-01-30 RX ORDER — PHENTERMINE AND TOPIRAMATE 11.25; 69 MG/1; MG/1
1 CAPSULE, EXTENDED RELEASE ORAL DAILY
Qty: 30 CAPSULE | Refills: 0 | Status: SHIPPED | OUTPATIENT
Start: 2025-01-30

## 2025-01-31 ENCOUNTER — LAB VISIT (OUTPATIENT)
Dept: LAB | Facility: HOSPITAL | Age: 60
End: 2025-01-31
Attending: FAMILY MEDICINE
Payer: OTHER GOVERNMENT

## 2025-01-31 DIAGNOSIS — G89.29 CHRONIC NONINTRACTABLE HEADACHE, UNSPECIFIED HEADACHE TYPE: ICD-10-CM

## 2025-01-31 DIAGNOSIS — E66.3 OVERWEIGHT (BMI 25.0-29.9): ICD-10-CM

## 2025-01-31 DIAGNOSIS — F41.1 GAD (GENERALIZED ANXIETY DISORDER): ICD-10-CM

## 2025-01-31 DIAGNOSIS — E78.49 OTHER HYPERLIPIDEMIA: ICD-10-CM

## 2025-01-31 DIAGNOSIS — R51.9 CHRONIC NONINTRACTABLE HEADACHE, UNSPECIFIED HEADACHE TYPE: ICD-10-CM

## 2025-01-31 LAB
ALBUMIN SERPL BCP-MCNC: 4.1 G/DL (ref 3.5–5.2)
ALP SERPL-CCNC: 71 U/L (ref 40–150)
ALT SERPL W/O P-5'-P-CCNC: 20 U/L (ref 10–44)
ANION GAP SERPL CALC-SCNC: 8 MMOL/L (ref 8–16)
AST SERPL-CCNC: 18 U/L (ref 10–40)
BASOPHILS # BLD AUTO: 0.02 K/UL (ref 0–0.2)
BASOPHILS NFR BLD: 0.5 % (ref 0–1.9)
BILIRUB SERPL-MCNC: 0.5 MG/DL (ref 0.1–1)
BUN SERPL-MCNC: 27 MG/DL (ref 8–23)
CALCIUM SERPL-MCNC: 9.1 MG/DL (ref 8.7–10.5)
CHLORIDE SERPL-SCNC: 106 MMOL/L (ref 95–110)
CHOLEST SERPL-MCNC: 187 MG/DL (ref 120–199)
CHOLEST/HDLC SERPL: 2.8 {RATIO} (ref 2–5)
CO2 SERPL-SCNC: 24 MMOL/L (ref 23–29)
CREAT SERPL-MCNC: 0.8 MG/DL (ref 0.5–1.4)
DIFFERENTIAL METHOD BLD: ABNORMAL
EOSINOPHIL # BLD AUTO: 0.1 K/UL (ref 0–0.5)
EOSINOPHIL NFR BLD: 1.3 % (ref 0–8)
ERYTHROCYTE [DISTWIDTH] IN BLOOD BY AUTOMATED COUNT: 12.9 % (ref 11.5–14.5)
EST. GFR  (NO RACE VARIABLE): >60 ML/MIN/1.73 M^2
ESTIMATED AVG GLUCOSE: 108 MG/DL (ref 68–131)
GLUCOSE SERPL-MCNC: 94 MG/DL (ref 70–110)
HBA1C MFR BLD: 5.4 % (ref 4–5.6)
HCT VFR BLD AUTO: 38.4 % (ref 37–48.5)
HDLC SERPL-MCNC: 66 MG/DL (ref 40–75)
HDLC SERPL: 35.3 % (ref 20–50)
HGB BLD-MCNC: 12.2 G/DL (ref 12–16)
IMM GRANULOCYTES # BLD AUTO: 0.01 K/UL (ref 0–0.04)
IMM GRANULOCYTES NFR BLD AUTO: 0.3 % (ref 0–0.5)
INSULIN COLLECTION INTERVAL: NORMAL
INSULIN SERPL-ACNC: 7.7 UU/ML
LDLC SERPL CALC-MCNC: 108.8 MG/DL (ref 63–159)
LYMPHOCYTES # BLD AUTO: 1.3 K/UL (ref 1–4.8)
LYMPHOCYTES NFR BLD: 34.6 % (ref 18–48)
MCH RBC QN AUTO: 33.3 PG (ref 27–31)
MCHC RBC AUTO-ENTMCNC: 31.8 G/DL (ref 32–36)
MCV RBC AUTO: 105 FL (ref 82–98)
MONOCYTES # BLD AUTO: 0.3 K/UL (ref 0.3–1)
MONOCYTES NFR BLD: 8.5 % (ref 4–15)
NEUTROPHILS # BLD AUTO: 2.1 K/UL (ref 1.8–7.7)
NEUTROPHILS NFR BLD: 54.8 % (ref 38–73)
NONHDLC SERPL-MCNC: 121 MG/DL
NRBC BLD-RTO: 0 /100 WBC
PLATELET # BLD AUTO: 191 K/UL (ref 150–450)
PMV BLD AUTO: 10.2 FL (ref 9.2–12.9)
POTASSIUM SERPL-SCNC: 4.1 MMOL/L (ref 3.5–5.1)
PROT SERPL-MCNC: 7.7 G/DL (ref 6–8.4)
RBC # BLD AUTO: 3.66 M/UL (ref 4–5.4)
SODIUM SERPL-SCNC: 138 MMOL/L (ref 136–145)
T4 FREE SERPL-MCNC: 0.89 NG/DL (ref 0.71–1.51)
TRIGL SERPL-MCNC: 61 MG/DL (ref 30–150)
TSH SERPL DL<=0.005 MIU/L-ACNC: 2.1 UIU/ML (ref 0.4–4)
WBC # BLD AUTO: 3.87 K/UL (ref 3.9–12.7)

## 2025-01-31 PROCEDURE — 84439 ASSAY OF FREE THYROXINE: CPT | Performed by: NURSE PRACTITIONER

## 2025-01-31 PROCEDURE — 80061 LIPID PANEL: CPT | Performed by: NURSE PRACTITIONER

## 2025-01-31 PROCEDURE — 85025 COMPLETE CBC W/AUTO DIFF WBC: CPT | Performed by: NURSE PRACTITIONER

## 2025-01-31 PROCEDURE — 84443 ASSAY THYROID STIM HORMONE: CPT | Performed by: NURSE PRACTITIONER

## 2025-01-31 PROCEDURE — 83036 HEMOGLOBIN GLYCOSYLATED A1C: CPT | Performed by: NURSE PRACTITIONER

## 2025-01-31 PROCEDURE — 83525 ASSAY OF INSULIN: CPT | Performed by: NURSE PRACTITIONER

## 2025-01-31 PROCEDURE — 36415 COLL VENOUS BLD VENIPUNCTURE: CPT | Performed by: NURSE PRACTITIONER

## 2025-01-31 PROCEDURE — 80053 COMPREHEN METABOLIC PANEL: CPT | Performed by: NURSE PRACTITIONER

## 2025-02-12 ENCOUNTER — OFFICE VISIT (OUTPATIENT)
Dept: PRIMARY CARE CLINIC | Facility: CLINIC | Age: 60
End: 2025-02-12
Payer: OTHER GOVERNMENT

## 2025-02-12 ENCOUNTER — LAB VISIT (OUTPATIENT)
Dept: LAB | Facility: HOSPITAL | Age: 60
End: 2025-02-12
Attending: FAMILY MEDICINE
Payer: OTHER GOVERNMENT

## 2025-02-12 VITALS
HEART RATE: 93 BPM | WEIGHT: 170.31 LBS | DIASTOLIC BLOOD PRESSURE: 76 MMHG | OXYGEN SATURATION: 97 % | BODY MASS INDEX: 32.15 KG/M2 | HEIGHT: 61 IN | SYSTOLIC BLOOD PRESSURE: 122 MMHG | TEMPERATURE: 98 F

## 2025-02-12 DIAGNOSIS — M19.042 ARTHRITIS OF BOTH HANDS: ICD-10-CM

## 2025-02-12 DIAGNOSIS — M19.041 ARTHRITIS OF BOTH HANDS: ICD-10-CM

## 2025-02-12 DIAGNOSIS — M47.812 OSTEOARTHRITIS OF CERVICAL SPINE, UNSPECIFIED SPINAL OSTEOARTHRITIS COMPLICATION STATUS: ICD-10-CM

## 2025-02-12 DIAGNOSIS — E66.811 CLASS 1 OBESITY DUE TO EXCESS CALORIES WITH SERIOUS COMORBIDITY AND BODY MASS INDEX (BMI) OF 31.0 TO 31.9 IN ADULT: ICD-10-CM

## 2025-02-12 DIAGNOSIS — F41.1 GAD (GENERALIZED ANXIETY DISORDER): ICD-10-CM

## 2025-02-12 DIAGNOSIS — G89.29 CHRONIC NONINTRACTABLE HEADACHE, UNSPECIFIED HEADACHE TYPE: ICD-10-CM

## 2025-02-12 DIAGNOSIS — R63.5 WEIGHT GAIN: ICD-10-CM

## 2025-02-12 DIAGNOSIS — F41.8 MIXED ANXIETY DEPRESSIVE DISORDER: ICD-10-CM

## 2025-02-12 DIAGNOSIS — M17.0 ARTHRITIS OF BOTH KNEES: ICD-10-CM

## 2025-02-12 DIAGNOSIS — R51.9 CHRONIC NONINTRACTABLE HEADACHE, UNSPECIFIED HEADACHE TYPE: ICD-10-CM

## 2025-02-12 DIAGNOSIS — G43.809 OTHER MIGRAINE WITHOUT STATUS MIGRAINOSUS, NOT INTRACTABLE: ICD-10-CM

## 2025-02-12 DIAGNOSIS — H02.539 LID LAG: ICD-10-CM

## 2025-02-12 DIAGNOSIS — H53.9 VISION CHANGES: ICD-10-CM

## 2025-02-12 DIAGNOSIS — E66.09 CLASS 1 OBESITY DUE TO EXCESS CALORIES WITH SERIOUS COMORBIDITY AND BODY MASS INDEX (BMI) OF 31.0 TO 31.9 IN ADULT: ICD-10-CM

## 2025-02-12 DIAGNOSIS — Z00.00 ROUTINE GENERAL MEDICAL EXAMINATION AT A HEALTH CARE FACILITY: Primary | ICD-10-CM

## 2025-02-12 DIAGNOSIS — E78.49 OTHER HYPERLIPIDEMIA: ICD-10-CM

## 2025-02-12 LAB
CRP SERPL-MCNC: 1.3 MG/L (ref 0–8.2)
ERYTHROCYTE [SEDIMENTATION RATE] IN BLOOD BY PHOTOMETRIC METHOD: 30 MM/HR (ref 0–36)

## 2025-02-12 PROCEDURE — 85652 RBC SED RATE AUTOMATED: CPT | Performed by: FAMILY MEDICINE

## 2025-02-12 PROCEDURE — 36415 COLL VENOUS BLD VENIPUNCTURE: CPT | Mod: PN | Performed by: FAMILY MEDICINE

## 2025-02-12 PROCEDURE — 86431 RHEUMATOID FACTOR QUANT: CPT | Performed by: FAMILY MEDICINE

## 2025-02-12 PROCEDURE — 99999 PR PBB SHADOW E&M-EST. PATIENT-LVL IV: CPT | Mod: PBBFAC,,, | Performed by: FAMILY MEDICINE

## 2025-02-12 PROCEDURE — 86140 C-REACTIVE PROTEIN: CPT | Performed by: FAMILY MEDICINE

## 2025-02-12 PROCEDURE — 86038 ANTINUCLEAR ANTIBODIES: CPT | Performed by: FAMILY MEDICINE

## 2025-02-12 PROCEDURE — 86200 CCP ANTIBODY: CPT | Performed by: FAMILY MEDICINE

## 2025-02-12 PROCEDURE — 99214 OFFICE O/P EST MOD 30 MIN: CPT | Mod: PBBFAC,PN | Performed by: FAMILY MEDICINE

## 2025-02-12 RX ORDER — NAPROXEN 500 MG/1
500 TABLET ORAL 2 TIMES DAILY PRN
Qty: 30 TABLET | Refills: 3 | Status: SHIPPED | OUTPATIENT
Start: 2025-02-12

## 2025-02-12 RX ORDER — CITALOPRAM 20 MG/1
20 TABLET, FILM COATED ORAL DAILY
Qty: 90 TABLET | Refills: 3 | Status: SHIPPED | OUTPATIENT
Start: 2025-02-12

## 2025-02-12 RX ORDER — RIZATRIPTAN BENZOATE 5 MG/1
5 TABLET, ORALLY DISINTEGRATING ORAL
Qty: 12 TABLET | Refills: 3 | Status: SHIPPED | OUTPATIENT
Start: 2025-02-12

## 2025-02-12 NOTE — PROGRESS NOTES
Chief Complaint  Chief Complaint   Patient presents with    Annual Exam       HPI  Tova Muñoz is a 60 y.o. female with multiple medical diagnoses as listed in the medical history and problem list that presents for  in person visit.     History of Present Illness    CHIEF COMPLAINT:  - Patient presents for a follow-up of weight loss and her annual exam, with concerns about stress-related weight gain and various physical discomforts.    HPI:  Patient, a 60-year-old female, reports cyclical weight gain coinciding with annual work-related stress from November through March. She has tracked her weight for over a year, observing a pattern of weight gain during this stressful period, followed by weight loss efforts afterwards. She works 10+ hour days during this time, often without breaks, leading to extreme fatigue. She typically leaves home by 7:00-7:30 AM and returns between 6:30-7:00 PM, feeling exhausted and unable to engage in physical activity or socializing.    Patient reports severe arthritis pain, particularly in her neck, which she attributes to a high school injury affecting her cervical spine. She sees a chiropractor every other week for stretching, providing short-term relief. She also notes worsening arthritis pain in her knees and hands.    Patient describes issues with her eyelids, noting that the skin hangs down and impacts her eyelashes, causing difficulty keeping her eyes open throughout the day and resulting in puffiness upon waking.    Patient reports loose skin on her abdomen and upper thighs following significant weight loss. She investigated treatments at [facility] but was rejected due to her medical history and family history of blood clots.    Patient recently had influenza for the first time in 30 years, which she believes she contracted from her . She sought treatment at an urgent care facility and received medication, which effectively reduced pain and achiness, though fatigue and  "cough persisted for 2 weeks.    Patient denies hunger, food cravings, or current sleep issues.    MEDICATIONS:  - Qsymia 11.25/69 mg capsule, daily, for weight loss  - Lipitor 20 mg, daily, for cholesterol management  - Celexa 20 mg, daily, for anxiety  - Maxalt, as needed, for migraine therapy  - Singulair, for airway management  - Naproxen    PMH:  - Migraine history  - Anxiety disorder  - Obesity  - Arthritis: neck, knees, hands    FAMILY HISTORY:  - Sister: history of multiple blood clots a year ago    SOCIAL HISTORY:  - Occupation: Works long hours (10+ hours/day) during busy season from November to March         No questionnaires on file.       Pmh, Psh, Family Hx, Social Hx, HM updated in Epic Tabs today.    Review of Systems   Constitutional:  Positive for appetite change and unexpected weight change. Negative for activity change and fatigue.   Respiratory:  Negative for cough and shortness of breath.    Cardiovascular:  Negative for chest pain and palpitations.   Gastrointestinal:  Negative for abdominal distention and abdominal pain.   Musculoskeletal:  Positive for arthralgias and myalgias.   Psychiatric/Behavioral:  Negative for dysphoric mood and sleep disturbance. The patient is nervous/anxious.         Objective:     Vitals:    02/12/25 0714   BP: 122/76   BP Location: Left arm   Pulse: 93   Temp: 97.5 °F (36.4 °C)   TempSrc: Tympanic   SpO2: 97%   Weight: 77.3 kg (170 lb 4.9 oz)   Height: 5' 1" (1.549 m)     Wt Readings from Last 10 Encounters:   02/12/25 77.3 kg (170 lb 4.9 oz)   12/20/24 76 kg (167 lb 8.8 oz)   10/01/24 72 kg (158 lb 11.7 oz)   08/09/24 72.6 kg (160 lb 0.9 oz)   07/31/24 72.6 kg (160 lb)   05/24/24 74.3 kg (163 lb 12.8 oz)   05/09/24 76.3 kg (168 lb 3.4 oz)   03/28/24 76.3 kg (168 lb 3.4 oz)   03/08/24 75.7 kg (166 lb 14.2 oz)   02/29/24 77 kg (169 lb 13.8 oz)     Physical Exam    TEST RESULTS:  - A1c: 5.4  - Insulin level: 7.7  - Thyroid: normal  - Total cholesterol: 187  - LDL: " 108  - HDL: 66  - Kidney function tests: normal  - Liver function tests: normal  - Hemoglobin: 12.2  - Hematocrit: 38.4  - White blood cell count: slightly lower  - Red blood cell count: slightly lower  - MCV: consistently elevated over past few years  - BUN: slightly elevated  - Glucose: 94  - Sodium: normal  - Potassium: normal       Physical Exam  Vitals reviewed.   Constitutional:       Appearance: Normal appearance. She is well-developed. She is obese.   HENT:      Head: Normocephalic and atraumatic.      Right Ear: Tympanic membrane and external ear normal.      Left Ear: Tympanic membrane and external ear normal.      Nose: Nose normal.      Mouth/Throat:      Mouth: Mucous membranes are moist.      Pharynx: Oropharynx is clear.   Eyes:      Conjunctiva/sclera: Conjunctivae normal.      Pupils: Pupils are equal, round, and reactive to light.   Neck:      Thyroid: No thyromegaly.   Cardiovascular:      Rate and Rhythm: Normal rate and regular rhythm.      Heart sounds: Normal heart sounds. No murmur heard.     No friction rub. No gallop.   Pulmonary:      Effort: Pulmonary effort is normal. No respiratory distress.      Breath sounds: Normal breath sounds. No wheezing or rales.   Abdominal:      General: Bowel sounds are normal. There is no distension.      Palpations: Abdomen is soft.      Tenderness: There is no abdominal tenderness. There is no rebound.   Musculoskeletal:         General: Swelling and tenderness present.      Cervical back: Normal range of motion and neck supple.   Lymphadenopathy:      Cervical: No cervical adenopathy.   Skin:     General: Skin is warm and dry.      Findings: No rash.   Neurological:      Mental Status: She is alert and oriented to person, place, and time.   Psychiatric:         Attention and Perception: Attention and perception normal.         Mood and Affect: Mood and affect normal.         Speech: Speech normal.         Behavior: Behavior normal.         Thought  Content: Thought content normal.         Cognition and Memory: Cognition and memory normal.         Judgment: Judgment normal.         Assessment:   LABS:   Lab Results   Component Value Date    HGBA1C 5.4 01/31/2025    HGBA1C 5.5 02/29/2024    HGBA1C 5.5 03/02/2023      Lab Results   Component Value Date    CHOL 187 01/31/2025    CHOL 174 05/02/2024    CHOL 269 (H) 02/29/2024     Lab Results   Component Value Date    LDLCALC 108.8 01/31/2025    LDLCALC 102.4 05/02/2024    LDLCALC 187.8 (H) 02/29/2024     Lab Results   Component Value Date    WBC 3.87 (L) 01/31/2025    HGB 12.2 01/31/2025    HCT 38.4 01/31/2025     01/31/2025    CHOL 187 01/31/2025    TRIG 61 01/31/2025    HDL 66 01/31/2025    ALT 20 01/31/2025    AST 18 01/31/2025     01/31/2025    K 4.1 01/31/2025     01/31/2025    CREATININE 0.8 01/31/2025    BUN 27 (H) 01/31/2025    CO2 24 01/31/2025    TSH 2.104 01/31/2025    HGBA1C 5.4 01/31/2025       Plan:   Assessment & Plan    Z00.00 Routine general medical exam at a [facility] care facility  E78.49 Other hyperlipidemia  R51.9, G89.29 Chronic nonintractable headache, unspecified headache type  F41.1 NIKOLAS (generalized anxiety disorder)  E66.811, E66.09, Z68.31 Class 1 obesity due to excess calories with serious comorbidity and body mass index (BMI) of 31.0 to 31.9 in adult  R63.5 Weight gain  F41.8 Mixed anxiety depressive disorder  G43.809 Other migraine without status migrainosus, not intractable  H02.539 Lid lag  H53.9 Vision changes  M19.041, M19.042 Arthritis of both hands  M17.0 Arthritis of both knees  M47.812 Osteoarthritis of cervical spine, unspecified spinal osteoarthritis complication status    IMPRESSION:  - Reviewed lab results: A1c 5.4, insulin 7.7, thyroid normal, cholesterol stable (total 187, , HDL 66), kidney and liver function normal  - Noted consistently lower white and RBC counts with elevated MCV  - Considered potential benefits of Wegovy for cardiovascular risk  reduction and weight management  - Evaluated need for rheumatology referral due to arthritis symptoms, but determined physical medicine and rehab or pain management more appropriate for degenerative arthritis  - Assessed eyelid concerns and determined need for oculoplastics evaluation    PLAN SUMMARY:  - Ordered autoimmune and inflammatory marker labs (non-fasting)  - Continue Singulair as prescribed  - Refilled naproxen for hand and knee arthritis  - Continue Qsymia 11.25/69 capsule daily for obesity management  - Continue Lipitor 20 mg daily for hyperlipidemia  - Continue Maxalt for migraines  - Continue Celexa 20 mg daily for anxiety  - Referred to Oculoplastics for lid lag evaluation  - Referred to Physical Medicine and Rehabilitation or Pain Management for neck and joint pain  - Follow-up in 3 months via video visit to assess medication efficacy and progress    ROUTINE GENERAL MEDICAL EXAMINATION AT A HEALTH CARE FACILITY:  - Ordered autoimmune and inflammatory marker labs (non-fasting).  - Scheduled follow up in 3 months via video visit to assess medication efficacy and overall progress.    OTHER HYPERLIPIDEMIA:  - Continued Lipitor 20 mg daily for hyperlipidemia management.    NIKOLAS (GENERALIZED ANXIETY DISORDER):  - Continued Celexa 20 mg daily for anxiety management.    CLASS 1 OBESITY DUE TO EXCESS CALORIES WITH SERIOUS COMORBIDITY AND BODY MASS INDEX (BMI) OF 31.0 TO 31.9 IN ADULT:  - Continued Qsymia 11.25/69 capsule daily for obesity management.  - Instructed the patient to inquire about Qsymia coverage through [facility] and potential for 90-day supply.    WEIGHT GAIN:  - Educated the patient about the correlation between stress, cortisol levels, and weight gain.    OTHER MIGRAINE WITHOUT STATUS MIGRAINOSUS, NOT INTRACTABLE:  - Continued Maxalt for migraine therapy.    LID LAG:  - Referred the patient to Oculoplastics for evaluation of lid lag.    ARTHRITIS OF BOTH HANDS AND KNEES:  - Refilled  naproxen for management of hand and knee arthritis.    OSTEOARTHRITIS OF CERVICAL SPINE, UNSPECIFIED SPINAL OSTEOARTHRITIS COMPLICATION STATUS:  - Referred the patient to Physical Medicine and Rehabilitation or Pain Management for evaluation of neck and joint pain.    WEIGHT MANAGEMENT:  - Explained importance of maintaining muscle mass through strength training to prevent metabolism slowdown during weight loss.  - Patient to consider incorporating resistance exercises into their routine.    STRESS MANAGEMENT:  - Patient to incorporate stress-reduction activities such as meditation or yoga.  - Advised on finding ways to delegate work tasks.    ELEVATED BUN:  - Recommend increasing water intake to address slightly elevated BUN.    CARDIOVASCULAR HEALTH:  -   Provided information on the approval of Wegovy for primary prevention of myocardial infarctions and cerebrovascular accidents.    MEDICATIONS/SUPPLEMENTS:  - Continue Singulair as prescribed.       Tova was seen today for annual exam.    Diagnoses and all orders for this visit:    Routine general medical examination at a health care facility    Other hyperlipidemia    Chronic nonintractable headache, unspecified headache type    NIKOLAS (generalized anxiety disorder)    Class 1 obesity due to excess calories with serious comorbidity and body mass index (BMI) of 31.0 to 31.9 in adult    Weight gain    Mixed anxiety depressive disorder  -     citalopram (CELEXA) 20 MG tablet; Take 1 tablet (20 mg total) by mouth once daily.    Other migraine without status migrainosus, not intractable  -     rizatriptan (MAXALT-MLT) 5 MG disintegrating tablet; Take 1 tablet (5 mg total) by mouth as needed for Migraine. May repeat in 2 hours if needed  -     naproxen (NAPROSYN) 500 MG tablet; Take 1 tablet (500 mg total) by mouth 2 (two) times daily as needed (migraine).    Lid lag  -     Ambulatory referral/consult to Ophthalmology; Future    Vision changes  -     Ambulatory  referral/consult to Ophthalmology; Future    Arthritis of both hands  -     Ambulatory referral/consult to Rheumatology; Future  -     KAYLA Screen w/Reflex; Future  -     Rheumatoid Factor; Future  -     Sedimentation rate; Future  -     C-Reactive Protein; Future  -     CYCLIC CITRUL PEPTIDE ANTIBODY, IGG; Future    Arthritis of both knees  -     Ambulatory referral/consult to Rheumatology; Future  -     KAYLA Screen w/Reflex; Future  -     Rheumatoid Factor; Future  -     Sedimentation rate; Future  -     C-Reactive Protein; Future  -     CYCLIC CITRUL PEPTIDE ANTIBODY, IGG; Future    Osteoarthritis of cervical spine, unspecified spinal osteoarthritis complication status  -     Ambulatory referral/consult to Rheumatology; Future  -     KAYLA Screen w/Reflex; Future  -     Rheumatoid Factor; Future  -     Sedimentation rate; Future  -     C-Reactive Protein; Future  -     CYCLIC CITRUL PEPTIDE ANTIBODY, IGG; Future        Mammo Digital Screening Bilat w/ Jonny  Narrative: Result:  Mammo Digital Screening Bilat w/ Jonny    History:  Patient is 59 y.o. and is seen for a screening mammogram.    Films Compared:  Prior images (if available) were compared.     Findings:  This procedure was performed using tomosynthesis.   Computer-aided detection was utilized in the interpretation of this   examination.    The breasts have scattered areas of fibroglandular density. There is no   evidence of suspicious masses, microcalcifications or architectural   distortion.  Impression:    No mammographic evidence of malignancy.    BI-RADS Category 1: Negative    Recommendation:  Routine screening mammogram in 1 year is recommended.    Your estimated lifetime risk of breast cancer (to age 85) based on   Tyrer-Cuzick risk assessment model is 4.94%.  According to the American   Cancer Society, patients with a lifetime breast cancer risk of 20% or   higher might benefit from supplemental screening tests, such as screening   breast MRI.    The  10-year ASCVD risk score (Kim RESENDEZ, et al., 2019) is: 2.8%    Values used to calculate the score:      Age: 60 years      Sex: Female      Is Non- : No      Diabetic: No      Tobacco smoker: No      Systolic Blood Pressure: 122 mmHg      Is BP treated: No      HDL Cholesterol: 66 mg/dL      Total Cholesterol: 187 mg/dL    Follow-up: Follow up in about 3 months (around 5/12/2025) for f/u VV Dr. Palma 3mmiley f/u meds, wt , referrals .    I spent a total of    60   minutes face to face and non-face to face on the date of this visit.This includes time preparing to see the patient (eg, review of tests, notes), obtaining and/or reviewing additional history from an independent historian and/or outside medical records, documenting clinical information in the electronic health record, independently interpreting results and/or communicating results to the patient/family/caregiver, or care coordinator.  Visit today included increased complexity associated with the care of the episodic problem addressed and managing the longitudinal care of the patient due to the serious and/or complex managed problem(s).    This note was generated with the assistance of ambient listening technology. Verbal consent was obtained by the patient and accompanying visitor(s) for the recording of patient appointment to facilitate this note. I attest to having reviewed and edited the generated note for accuracy, though some syntax or spelling errors may persist. Please contact the author of this note for any clarification.       There are no Patient Instructions on file for this visit.

## 2025-02-13 LAB
ANA SER QL IF: NORMAL
CCP AB SER IA-ACNC: 0.5 U/ML
RHEUMATOID FACT SERPL-ACNC: <13 IU/ML (ref 0–15)

## 2025-02-14 NOTE — PROGRESS NOTES
"Tova Muñoz presented for a Prevention exam at the same time as problem based visit with concerns and treatments addressed by physician., Patient desired to have these issues addressed at the same time thus 25 modifier added to reflect the time spent on the problem based encounter as well as the prevention services that were performed.      The following components were reviewed and updated:  Pmh, Psh, Family Hx, Social Hx, Allergies, Current Medications, and HM updated in Epic Tabs today.     Vitals:    02/12/25 0714   BP: 122/76   BP Location: Left arm   Pulse: 93   Temp: 97.5 °F (36.4 °C)   TempSrc: Tympanic   SpO2: 97%   Weight: 77.3 kg (170 lb 4.9 oz)   Height: 5' 1" (1.549 m)     Body mass index is 32.18 kg/m².         Annual Wellness Visit, Subsequent Z00.00 ICD 10 code    Provided Tova with a 5-10 year written screening schedule and personal prevention plan. Recommendations were developed using the USPSTF age appropriate recommendations. Education, counseling, and referrals were provided as needed.  After Visit Summary given to patient which includes a list of additional screenings\tests needed.      Health Maintenance Due   Topic Date Due    Shingles Vaccine (1 of 2) Never done    Pneumococcal Vaccines (Age 50+) (1 of 1 - PCV) Never done    RSV Vaccine (Age 60+ and Pregnant patients) (1 - Risk 60-74 years 1-dose series) Never done    Influenza Vaccine (1) 09/01/2024    Mammogram  05/09/2025       Health Maintenance Topics with due status: Not Due       Topic Last Completion Date    TETANUS VACCINE 02/02/2016    Colorectal Cancer Screening 08/15/2022    Cervical Cancer Screening 04/10/2023    Hemoglobin A1c (Diabetic Prevention Screening) 01/31/2025    Lipid Panel 01/31/2025       Patient Active Problem List   Diagnosis    Seasonal allergies    Colon cancer screening    Chronic nonintractable headache    Hot flashes due to menopause    Elevated BP without diagnosis of hypertension    Weakness of both hips "    Difficulty navigating stairs    Weakness of right lower extremity    NIKOLAS (generalized anxiety disorder)    Family history of diabetes mellitus (DM)    Other hyperlipidemia         Follow up in about 3 months (around 5/12/2025) for f/u VV Dr. Lopez- 3mo f/u meds, wt , referrals .      Oumou Lopez MD      60   minutes of total time spent on the encounter, which includes face to face time and non-face to face time preparing to see the patient (eg, review of tests), Obtaining and/or reviewing separately obtained history, Documenting clinical information in the electronic or other health record, Independently interpreting results (not separately reported) and communicating results to the patient/family/caregiver, or Care coordination (not separately reported).

## 2025-02-28 RX ORDER — ATORVASTATIN CALCIUM 20 MG/1
20 TABLET, FILM COATED ORAL NIGHTLY
Qty: 90 TABLET | Refills: 2 | Status: SHIPPED | OUTPATIENT
Start: 2025-02-28 | End: 2026-02-28

## 2025-03-04 DIAGNOSIS — G43.809 OTHER MIGRAINE WITHOUT STATUS MIGRAINOSUS, NOT INTRACTABLE: ICD-10-CM

## 2025-03-04 DIAGNOSIS — E66.3 OVERWEIGHT (BMI 25.0-29.9): ICD-10-CM

## 2025-03-04 DIAGNOSIS — F41.8 MIXED ANXIETY DEPRESSIVE DISORDER: ICD-10-CM

## 2025-03-04 NOTE — TELEPHONE ENCOUNTER
No care due was identified.  [facility] Embedded Care Due Messages. Reference number: [id number].   3/04/2025 5:33:23 PM CST

## 2025-03-05 DIAGNOSIS — E66.3 OVERWEIGHT (BMI 25.0-29.9): ICD-10-CM

## 2025-03-05 RX ORDER — PHENTERMINE AND TOPIRAMATE 11.25; 69 MG/1; MG/1
CAPSULE, EXTENDED RELEASE ORAL
Refills: 0 | OUTPATIENT
Start: 2025-03-05

## 2025-03-05 RX ORDER — RIZATRIPTAN BENZOATE 5 MG/1
TABLET, ORALLY DISINTEGRATING ORAL
Refills: 0 | OUTPATIENT
Start: 2025-03-05

## 2025-03-05 RX ORDER — CITALOPRAM 20 MG/1
TABLET, FILM COATED ORAL
Refills: 0 | OUTPATIENT
Start: 2025-03-05

## 2025-03-05 RX ORDER — PHENTERMINE AND TOPIRAMATE 11.25; 69 MG/1; MG/1
1 CAPSULE, EXTENDED RELEASE ORAL DAILY
Qty: 90 CAPSULE | Refills: 0 | Status: SHIPPED | OUTPATIENT
Start: 2025-03-05

## 2025-03-05 RX ORDER — NAPROXEN 500 MG/1
500 TABLET ORAL 2 TIMES DAILY WITH MEALS
Qty: 180 TABLET | Refills: 1 | Status: SHIPPED | OUTPATIENT
Start: 2025-03-05

## 2025-03-05 NOTE — TELEPHONE ENCOUNTER
Refill Routing Note   Medication(s) are not appropriate for processing by [facility] for the following reason(s):        Outside of protocol    [facility] action(s):  Quick Discontinue  Route        Medication Therapy Plan: refill op/no sig      Appointments  past 12m or future 3m with PCP    Date Provider   Last Visit   2/12/2025 Oumou Lopez MD   Next Visit   3/5/2025 Oumou Lopez MD   ED visits in past 90 days: 0        Note composed:1:42 PM 03/05/2025

## 2025-03-05 NOTE — TELEPHONE ENCOUNTER
No care due was identified.  [facility] Embedded Care Due Messages. Reference number: [id number].   3/05/2025 1:20:31 PM CST

## 2025-03-06 RX ORDER — ATORVASTATIN CALCIUM 20 MG/1
20 TABLET, FILM COATED ORAL NIGHTLY
Qty: 90 TABLET | Refills: 3 | Status: SHIPPED | OUTPATIENT
Start: 2025-03-06

## 2025-05-13 ENCOUNTER — OFFICE VISIT (OUTPATIENT)
Dept: PRIMARY CARE CLINIC | Facility: CLINIC | Age: 60
End: 2025-05-13
Payer: OTHER GOVERNMENT

## 2025-05-13 VITALS — WEIGHT: 168 LBS | BODY MASS INDEX: 31.72 KG/M2 | HEIGHT: 61 IN

## 2025-05-13 DIAGNOSIS — Z12.4 ENCOUNTER FOR SCREENING FOR CERVICAL CANCER: ICD-10-CM

## 2025-05-13 DIAGNOSIS — G43.809 OTHER MIGRAINE WITHOUT STATUS MIGRAINOSUS, NOT INTRACTABLE: ICD-10-CM

## 2025-05-13 DIAGNOSIS — Z12.31 ENCOUNTER FOR SCREENING MAMMOGRAM FOR BREAST CANCER: ICD-10-CM

## 2025-05-13 DIAGNOSIS — E66.09 CLASS 1 OBESITY DUE TO EXCESS CALORIES WITH SERIOUS COMORBIDITY AND BODY MASS INDEX (BMI) OF 31.0 TO 31.9 IN ADULT: Primary | ICD-10-CM

## 2025-05-13 DIAGNOSIS — E66.811 CLASS 1 OBESITY DUE TO EXCESS CALORIES WITH SERIOUS COMORBIDITY AND BODY MASS INDEX (BMI) OF 31.0 TO 31.9 IN ADULT: Primary | ICD-10-CM

## 2025-05-13 DIAGNOSIS — F41.8 MIXED ANXIETY DEPRESSIVE DISORDER: ICD-10-CM

## 2025-05-13 DIAGNOSIS — E78.49 OTHER HYPERLIPIDEMIA: ICD-10-CM

## 2025-05-13 RX ORDER — PHENTERMINE AND TOPIRAMATE 15; 92 MG/1; MG/1
1 CAPSULE, EXTENDED RELEASE ORAL DAILY
Qty: 90 CAPSULE | Refills: 0 | Status: SHIPPED | OUTPATIENT
Start: 2025-05-13

## 2025-05-13 RX ORDER — CITALOPRAM 20 MG/1
20 TABLET ORAL DAILY
Qty: 90 TABLET | Refills: 3 | Status: SHIPPED | OUTPATIENT
Start: 2025-05-13

## 2025-05-13 NOTE — PROGRESS NOTES
Chief Complaint  Chief Complaint   Patient presents with    Follow-up      f/u - meds weight referral     Family Problem     Sister re-diagnosed with breast cancer        HPI  Tova Muñoz is a 60 y.o. female with multiple medical diagnoses as listed in the medical history and problem list that presents for  virtual visit.       History of Present Illness    CHIEF COMPLAINT:  - Patient presents for a three-month follow-up visit to discuss weight loss progress, medications, and referrals.    HPI:  Patient, a 60-year-old female, returns for a three-month follow-up regarding her weight loss journey. She reports feeling stagnant with her weight loss efforts. Patient was without her weight loss medication for 10 days due to prescription changes, during which she noticed increased cravings and appetite, attributing some of this to elevated stress levels. She gained approximately 6 lbs during this time, which she is now struggling to lose. Her weight has been fluctuating between the same 2-4 lbs, ranging from 166 to 169 lbs. Patient expresses frustration with the lack of progress, questioning if it is due to her efforts or if her body has become accustomed to the medication.    Patient mentions occasional elevated blood pressure, describing days when she felt overwhelmed and could physically sense her blood pressure was high. She believes her blood pressure is generally good most of the time.    Patient discusses significant family stressors, including her sister's recent breast cancer recurrence diagnosis and scheduled complete mastectomy. Her busy work schedule has not slowed down as she had hoped, contributing to her overall stress.    Patient acknowledges the need to address her stress levels and improve her exercise routine to support her weight loss efforts. She expresses interest in potentially seeing a therapist to help manage her stress.    MEDICATIONS:  - Qsymia 11.25/69, for weight loss  - Celexa, for mixed  anxiety and depression  - Lipitor, for hyperlipidemia  - Qsymia: Patient was without the medication for about 10 days while prescription was switched over to [facility]  - Lipitor: Patient was out of this medication for a while, waiting for refill    PMH:  - Overweight and obesity  - Mixed anxiety and depression  - Hyperlipidemia  - Last Pap: 2023, negative    FAMILY HISTORY:  - Sister: Breast cancer 2 years ago, left breast mastectomy due to ductal cancer. Recent recurrence with new lump in right breast, scheduled for complete mastectomy.  - Sister: Labial cancer 4 years ago    SOCIAL HISTORY:  - Occupation: Employed         Pmh, Psh, Family Hx, Social Hx updated in Epic Tabs today.     Review of Systems   Constitutional:  Positive for activity change and appetite change. Negative for unexpected weight change.   HENT:  Negative for hearing loss, rhinorrhea and trouble swallowing.    Eyes:  Negative for discharge and visual disturbance.   Respiratory:  Negative for chest tightness and wheezing.    Cardiovascular:  Negative for chest pain and palpitations.   Gastrointestinal:  Positive for constipation. Negative for blood in stool, diarrhea and vomiting.   Endocrine: Negative for polydipsia and polyuria.   Genitourinary:  Negative for difficulty urinating, dysuria, hematuria and menstrual problem.   Musculoskeletal:  Positive for arthralgias and joint swelling. Negative for neck pain.   Neurological:  Negative for weakness and headaches.   Psychiatric/Behavioral:  Positive for decreased concentration and dysphoric mood. Negative for confusion. The patient is nervous/anxious.            Physical Exam  Vitals reviewed.   Constitutional:       General: She is awake. She is not in acute distress.     Appearance: Normal appearance. She is well-developed and well-groomed. She is obese. She is not ill-appearing.   HENT:      Head: Normocephalic and atraumatic.      Right Ear: External ear normal.      Left Ear:  External ear normal.      Nose: Nose normal.      Mouth/Throat:      Lips: Pink.   Eyes:      Conjunctiva/sclera: Conjunctivae normal.   Pulmonary:      Effort: Pulmonary effort is normal.   Neurological:      Mental Status: She is alert.   Psychiatric:         Attention and Perception: Attention and perception normal. She is attentive.         Mood and Affect: Mood and affect normal. Mood is not anxious or depressed. Affect is not labile, blunt, angry or inappropriate.         Speech: Speech normal. She is communicative. Speech is not rapid and pressured, delayed, slurred or tangential.         Behavior: Behavior normal. Behavior is not agitated, slowed, aggressive, withdrawn, hyperactive or combative. Behavior is cooperative.         Thought Content: Thought content normal. Thought content is not paranoid or delusional. Thought content does not include homicidal or suicidal ideation. Thought content does not include homicidal or suicidal plan.         Cognition and Memory: Cognition and memory normal. Memory is not impaired. She does not exhibit impaired recent memory or impaired remote memory.         Judgment: Judgment normal. Judgment is not impulsive or inappropriate.       Physical Exam    Vitals: Weight: 168.4 lbs.  TEST RESULTS:  - KAYLA: Recently, normal/negative  - Sedimentation rate: Recently, normal/negative  - CRP: Recently, normal/negative  - CCP: Recently, normal/negative  - Rheumatoid factor: Recently, normal/negative  - Pap smear: 2023, negative  - HPV test: 2023, negative  IMAGING:  - Mammogram: May 9th (previous year)           ASSESSMENT/PLAN:  1. Class 1 obesity due to excess calories with serious comorbidity and body mass index (BMI) of 31.0 to 31.9 in adult  -     phentermine-topiramate (QSYMIA) 15-92 mg CM24; Take 1 capsule by mouth Daily.  Dispense: 90 capsule; Refill: 0    2. Mixed anxiety depressive disorder  -     Ambulatory referral/consult to Psychiatry; Future; Expected date:  05/20/2025  -     citalopram (CELEXA) 20 MG tablet; Take 1 tablet (20 mg total) by mouth once daily.  Dispense: 90 tablet; Refill: 3    3. Other migraine without status migrainosus, not intractable    4. Other hyperlipidemia    5. Encounter for screening mammogram for breast cancer  -     Mammo Digital Screening Bilat w/ Jonny (XPD); Future; Expected date: 05/13/2025    6. Encounter for screening for cervical cancer  -     Ambulatory referral/consult to Gynecology; Future; Expected date: 05/20/2025      Assessment & Plan    E66.811, E66.09, Z68.31 Class 1 obesity due to excess calories with serious comorbidity and body mass index (BMI) of 31.0 to 31.9 in adult  F41.8 Mixed anxiety depressive disorder  G43.809 Other migraine without status migrainosus, not intractable  E78.49 Other hyperlipidemia  Z12.31 Encounter for screening mammogram for breast cancer  Z12.4 Encounter for screening for cervical cancer    IMPRESSION:  Increased Qsymia from 11.25/69 mg to highest level (15/92 mg) daily for 90 days to address weight loss plateau and renewed appetite suppression.  Considered adding naltrexone for additional weight loss support and potential arthritis benefit, but opted to maximize Qsymia first.  Recommend counseling to address stress management, which may be impacting weight loss efforts.  Advised annual pelvic exam due to family history of labial cancer, despite negative Pap smear in 2023 allowing 3-5 year interval.    PLAN SUMMARY:  - Referred to Behavioral health for counseling  - Referred to Gynecology for pelvic exam  - Ordered mammogram for routine screening  - Increased Qsymia to 15/92 mg daily for 90 days  - Continue Celexa  - Recommend increasing physical activity and exercise  - Engage in stress reduction activities  - Follow up in approximately 12 weeks before Qsymia prescription runs out    CLASS 1 OBESITY DUE TO EXCESS CALORIES WITH SERIOUS COMORBIDITY AND BODY MASS INDEX (BMI) OF 31.0 TO 31.9 IN  ADULT:  - Increased Qsymia from 11.25/69 mg to highest level (15/92 mg) daily for 90 days to address weight loss plateau and improve appetite suppression.  - Recommend increasing physical activity and exercise to support weight loss efforts.  - Follow up in approximately 12 weeks (before Qsymia prescription runs out).    MIXED ANXIETY DEPRESSIVE DISORDER:  - Continue Celexa.  - Patient to engage in stress reduction activities.  - Referred to Behavioral health for counseling with Jenna Mcnulty or another counselor.  - Patient advised to contact behavioral health department directly if they miss a call regarding counseling appointment scheduling.    ENCOUNTER FOR SCREENING MAMMOGRAM FOR BREAST CANCER:  - Ordered mammogram as routine screening is due.    ENCOUNTER FOR SCREENING FOR CERVICAL CANCER:  - Explained Pap smear guidelines: 3-5 year interval with negative results, though annual pelvic exam still recommended.  - Referred to Gynecology for pelvic exam.       Mammo Digital Screening Bilat w/ Jonny  Narrative: Result:  Mammo Digital Screening Bilat w/ Jonny    History:  Patient is 59 y.o. and is seen for a screening mammogram.    Films Compared:  Prior images (if available) were compared.     Findings:  This procedure was performed using tomosynthesis.   Computer-aided detection was utilized in the interpretation of this   examination.    The breasts have scattered areas of fibroglandular density. There is no   evidence of suspicious masses, microcalcifications or architectural   distortion.  Impression:    No mammographic evidence of malignancy.    BI-RADS Category 1: Negative    Recommendation:  Routine screening mammogram in 1 year is recommended.    Your estimated lifetime risk of breast cancer (to age 85) based on   Tyrer-Cuzick risk assessment model is 4.94%.  According to the American   Cancer Society, patients with a lifetime breast cancer risk of 20% or   higher might benefit from supplemental screening  tests, such as screening   breast MRI.    Lab Results   Component Value Date    WBC 3.87 (L) 01/31/2025    HGB 12.2 01/31/2025    HCT 38.4 01/31/2025     01/31/2025    CHOL 187 01/31/2025    TRIG 61 01/31/2025    HDL 66 01/31/2025    ALT 20 01/31/2025    AST 18 01/31/2025     01/31/2025    K 4.1 01/31/2025     01/31/2025    CREATININE 0.8 01/31/2025    BUN 27 (H) 01/31/2025    CO2 24 01/31/2025    TSH 2.104 01/31/2025    HGBA1C 5.4 01/31/2025       Follow-up: Follow up in about 12 weeks (around 8/5/2025) for f/u VV Dr. Lopez- f/u wt loss .    ______________________________________________________________________    Face to Face time with patient:  7:00 AM CDT - 720am     The patient location is:  home  The chief complaint leading to consultation is: noted   Visit type: Virtual visit with synchronous audio and video   Each patient to whom he or she provides medical services by telemedicine is:  (1) informed of the relationship between the physician and patient and the respective role of any other health care provider with respect to management of the patient; and (2) notified that he or she may decline to receive medical services by telemedicine and may withdraw from such care at any time.    I spent a total of   30    minutes face to face and non-face to face on the date of this visit.This includes time preparing to see the patient (eg, review of tests, notes), obtaining and/or reviewing additional history from an independent historian and/or outside medical records, documenting clinical information in the electronic health record, independently interpreting results and/or communicating results to the patient/family/caregiver, or care coordinator.  Visit today included increased complexity associated with the care of the episodic problem addressed and managing the longitudinal care of the patient due to the serious and/or complex managed problem(s).    This note was generated with the assistance of  ambient listening technology. Verbal consent was obtained by the patient and accompanying visitor(s) for the recording of patient appointment to facilitate this note. I attest to having reviewed and edited the generated note for accuracy, though some syntax or spelling errors may persist. Please contact the author of this note for any clarification.

## 2025-05-13 NOTE — PATIENT INSTRUCTIONS
[facility]   To schedule, please call the [place] phone number : [phone]  Call and leave name, best contact phone vs mychart message, and times to be reached. Also mention that Dr. Lopez placed a referral for you.

## 2025-05-14 ENCOUNTER — PATIENT MESSAGE (OUTPATIENT)
Dept: PSYCHIATRY | Facility: CLINIC | Age: 60
End: 2025-05-14
Payer: OTHER GOVERNMENT

## 2025-05-15 ENCOUNTER — OFFICE VISIT (OUTPATIENT)
Dept: OPHTHALMOLOGY | Facility: CLINIC | Age: 60
End: 2025-05-15
Payer: OTHER GOVERNMENT

## 2025-05-15 DIAGNOSIS — H25.11 NUCLEAR SCLEROSIS OF RIGHT EYE: ICD-10-CM

## 2025-05-15 DIAGNOSIS — H02.834 DERMATOCHALASIS OF BOTH UPPER EYELIDS: Primary | ICD-10-CM

## 2025-05-15 DIAGNOSIS — H25.12 NUCLEAR SCLEROSIS OF LEFT EYE: ICD-10-CM

## 2025-05-15 DIAGNOSIS — H02.831 DERMATOCHALASIS OF BOTH UPPER EYELIDS: Primary | ICD-10-CM

## 2025-05-15 PROCEDURE — 99999 PR PBB SHADOW E&M-EST. PATIENT-LVL III: CPT | Mod: PBBFAC,,, | Performed by: STUDENT IN AN ORGANIZED HEALTH CARE EDUCATION/TRAINING PROGRAM

## 2025-05-15 PROCEDURE — 99213 OFFICE O/P EST LOW 20 MIN: CPT | Mod: PBBFAC | Performed by: STUDENT IN AN ORGANIZED HEALTH CARE EDUCATION/TRAINING PROGRAM

## 2025-05-15 NOTE — PROGRESS NOTES
HPI     Annual Exam     Additional comments: Pt sees Dr. Mir   Pt worried about Ptosis   Pt told to have a glaucoma eval due to her sister being newly diagnosed  No other complaints             Last edited by Cristian Hammer on 5/15/2025  1:45 PM.            Assessment /Plan     For exam results, see Encounter Report.    Dermatochalasis of both upper eyelids  -  Referring patient to Dr.JP stephens for lid eval     Nuclear sclerosis of right eye  Nuclear sclerosis of left eye- - Not visually significant, monitor    *Strong Fm Hx of narrow angles, will gonio at next visit with IOP      RTC 1-3M IOP and Gonio

## 2025-05-29 ENCOUNTER — RESULTS FOLLOW-UP (OUTPATIENT)
Dept: PRIMARY CARE CLINIC | Facility: CLINIC | Age: 60
End: 2025-05-29

## 2025-05-29 ENCOUNTER — OFFICE VISIT (OUTPATIENT)
Dept: OBSTETRICS AND GYNECOLOGY | Facility: CLINIC | Age: 60
End: 2025-05-29
Payer: OTHER GOVERNMENT

## 2025-05-29 ENCOUNTER — HOSPITAL ENCOUNTER (OUTPATIENT)
Dept: RADIOLOGY | Facility: HOSPITAL | Age: 60
Discharge: HOME | End: 2025-05-29
Attending: FAMILY MEDICINE
Payer: OTHER GOVERNMENT

## 2025-05-29 VITALS
HEIGHT: 61 IN | WEIGHT: 171.06 LBS | SYSTOLIC BLOOD PRESSURE: 124 MMHG | DIASTOLIC BLOOD PRESSURE: 76 MMHG | BODY MASS INDEX: 32.3 KG/M2

## 2025-05-29 DIAGNOSIS — Z12.31 ENCOUNTER FOR SCREENING MAMMOGRAM FOR BREAST CANCER: ICD-10-CM

## 2025-05-29 DIAGNOSIS — Z01.419 ENCOUNTER FOR ANNUAL ROUTINE GYNECOLOGICAL EXAMINATION: Primary | ICD-10-CM

## 2025-05-29 DIAGNOSIS — N95.1 VAGINAL DRYNESS, MENOPAUSAL: ICD-10-CM

## 2025-05-29 PROCEDURE — 77063 BREAST TOMOSYNTHESIS BI: CPT | Mod: TC

## 2025-05-29 PROCEDURE — 77067 SCR MAMMO BI INCL CAD: CPT | Mod: 26,,, | Performed by: STUDENT IN AN ORGANIZED HEALTH CARE EDUCATION/TRAINING PROGRAM

## 2025-05-29 PROCEDURE — 99213 OFFICE O/P EST LOW 20 MIN: CPT | Mod: PBBFAC

## 2025-05-29 PROCEDURE — 99396 PREV VISIT EST AGE 40-64: CPT | Mod: S$PBB,,,

## 2025-05-29 PROCEDURE — 99999 PR PBB SHADOW E&M-EST. PATIENT-LVL III: CPT | Mod: PBBFAC,,,

## 2025-05-29 PROCEDURE — 77063 BREAST TOMOSYNTHESIS BI: CPT | Mod: 26,,, | Performed by: STUDENT IN AN ORGANIZED HEALTH CARE EDUCATION/TRAINING PROGRAM

## 2025-05-29 RX ORDER — ESTRADIOL 10 UG/1
TABLET, FILM COATED VAGINAL
Qty: 30 TABLET | Refills: 3 | Status: SHIPPED | OUTPATIENT
Start: 2025-05-29 | End: 2026-06-12

## 2025-05-29 RX ORDER — ESTRADIOL 10 UG/1
10 TABLET, FILM COATED VAGINAL DAILY
Qty: 90 TABLET | Refills: 0 | Status: SHIPPED | OUTPATIENT
Start: 2025-05-29 | End: 2025-05-29 | Stop reason: ALTCHOICE

## 2025-05-29 NOTE — PROGRESS NOTES
Subjective:       Patient ID: Tova Muñoz is a 60 y.o. female.    Chief Complaint:  Well Woman (Annual/)      History of Present Illness  Gynecologic Exam  The patient's pertinent negatives include no genital itching, genital lesions, genital odor, genital rash, missed menses, pelvic pain, vaginal bleeding or vaginal discharge. This is a new problem. The current episode started more than 1 month ago. The problem occurs intermittently. The problem has been unchanged. She is not pregnant. Associated symptoms include constipation and painful intercourse. Pertinent negatives include no abdominal pain, anorexia, back pain, chills, diarrhea, discolored urine, dysuria, fever, flank pain, headaches, hematuria, nausea, rash, urgency or vomiting. The vaginal discharge was milky and scant. There has been no bleeding. She has not been passing clots. She has not been passing tissue. Nothing aggravates the symptoms. She has tried nothing for the symptoms. She is sexually active. No, her partner does not have an STD. She is postmenopausal. Her past medical history is significant for a  section, endometriosis, miscarriage and ovarian cysts.     Annual Exam-Postmenopausal  Patient presents for annual exam. The patient has complaints today of painful intercourse. The patient is sexually active, MM with . GYN screening history: last pap: approximate date 2023 and was normal and last mammogram: approximate date 2024 and was normal. Annual mammogram today. The patient is not currently taking hormone replacement therapy. Patient denies post-menopausal vaginal bleeding. The patient wears seatbelts: yes. The patient participates in regular exercise: no. Has the patient ever been transfused or tattooed?: not asked.     Still with painful intercourse, did not like the vaginal estrogen cream so she stopped using. Trying lubrication and coconut oil, still no improvement.         GYN & OB History  Patient's last menstrual  period was 2018.   Date of Last Pap: 2023    OB History    Para Term  AB Living   4 4 2   2   SAB IAB Ectopic Multiple Live Births       2      # Outcome Date GA Lbr Uriel/2nd Weight Sex Type Anes PTL Lv   4 Para      CS-Unspec   TOI   3 Para      CS-Unspec   TOI   2 Term            1 Term                Review of Systems  Review of Systems   Constitutional:  Negative for chills and fever.   Gastrointestinal:  Positive for constipation. Negative for abdominal pain, anorexia, diarrhea, nausea and vomiting.   Genitourinary:  Positive for dyspareunia and vaginal dryness. Negative for dysuria, flank pain, hematuria, missed menses, pelvic pain, urgency and vaginal discharge.   Musculoskeletal:  Negative for back pain.   Integumentary:  Negative for rash.   Neurological:  Negative for headaches.           Objective:      Physical Exam:   Constitutional: She is oriented to person, place, and time. She appears well-developed and well-nourished.    HENT:   Head: Normocephalic and atraumatic.   Nose: Nose normal.    Eyes: Pupils are equal, round, and reactive to light. Conjunctivae and EOM are normal.     Cardiovascular:  Normal rate and regular rhythm.             Pulmonary/Chest: Effort normal. She has no decreased breath sounds. She has no rhonchi. Right breast exhibits augmentation. Right breast exhibits no inverted nipple, no mass, no nipple discharge, no tenderness and no bleeding. Left breast exhibits augmentation. Left breast exhibits no inverted nipple, no mass, no nipple discharge, no tenderness and no bleeding. Breasts are symmetrical.        Abdominal: Soft. There is no abdominal tenderness.     Genitourinary:    Inguinal canal, uterus, right adnexa and left adnexa normal.      Pelvic exam was performed with patient supine.   The external female genitalia was normal.   No external genitalia lesions identified,Genitalia hair distrobution normal .     Labial bartholins normal.Cervix is normal.  Right adnexum displays no mass and no tenderness. Left adnexum displays no mass and no tenderness. No vaginal discharge, tenderness or bleeding in the vagina. Vaginal atrophy noted. Cervix exhibits no motion tenderness, no discharge and no tenderness.    pap smear not completedUerus contour normal  Uterus is not tender. Normal urethral meatus.          Musculoskeletal: Normal range of motion and moves all extremeties.       Neurological: She is alert and oriented to person, place, and time.    Skin: Skin is warm and dry.    Psychiatric: She has a normal mood and affect. Her speech is normal and behavior is normal. Mood, judgment and thought content normal.             Assessment:        1. Encounter for annual routine gynecological examination    2. Vaginal dryness, menopausal               Plan:   Continue annual well woman exam.  Pap current. Due 4/2026. Patient was counseled today on ASCCP screening guidelines (pap smear every 3 years in low risk patients) and recommendations for annual pelvic exams and clinical breast exams, yearly mammograms ; and to see her PCP for other healthcare maintenance.   Patient excepts trial of Vagifem     Diagnosis and orders this visit:  Encounter for annual routine gynecological examination  -     Ambulatory referral/consult to Gynecology    Vaginal dryness, menopausal  -     Discontinue: estradioL (VAGIFEM) 10 mcg Tab; Place 1 tablet (10 mcg total) vaginally once daily.  Dispense: 90 tablet; Refill: 0  -     estradioL (VAGIFEM) 10 mcg Tab; Place 1 tablet (10 mcg total) vaginally once daily for 14 days, THEN 1 tablet (10 mcg total) twice a week.  Dispense: 30 tablet; Refill: 3

## 2025-07-22 DIAGNOSIS — G43.809 OTHER MIGRAINE WITHOUT STATUS MIGRAINOSUS, NOT INTRACTABLE: ICD-10-CM

## 2025-07-22 RX ORDER — RIZATRIPTAN BENZOATE 5 MG/1
TABLET, ORALLY DISINTEGRATING ORAL
Refills: 0 | OUTPATIENT
Start: 2025-07-22
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
## 2025-07-23 ENCOUNTER — OFFICE VISIT (OUTPATIENT)
Dept: PSYCHIATRY | Facility: CLINIC | Age: 60
End: 2025-07-23
Payer: OTHER GOVERNMENT

## 2025-07-23 DIAGNOSIS — F41.8 MIXED ANXIETY DEPRESSIVE DISORDER: ICD-10-CM

## 2025-07-23 DIAGNOSIS — F41.1 GENERALIZED ANXIETY DISORDER: Primary | ICD-10-CM

## 2025-07-23 PROCEDURE — 90791 PSYCH DIAGNOSTIC EVALUATION: CPT | Mod: 95,,, | Performed by: SOCIAL WORKER

## 2025-07-23 NOTE — TELEPHONE ENCOUNTER
Refill Decision Note   Tova Muñoz  is requesting a refill authorization.  Brief Assessment and Rationale for Refill:  Quick Discontinue     Medication Therapy Plan:   Pharmacy is requesting new scripts for the following medications without required information, (sig/ frequency/qty/etc)        Comments:   Pharmacies have been requesting medications for patients without required information, (sig, frequency, qty, etc.). In addition, requests are sent for medication(s) pt. are currently not taking, and medications patients have never taken.    We have spoken to the pharmacies about these request types and advised their teams previously that we are unable to assess these New Script requests and require all details for these requests. This is a known issue and has been reported.   Note composed:11:40 PM 07/22/2025

## 2025-07-23 NOTE — TELEPHONE ENCOUNTER
No care due was identified.  [facility] Embedded Care Due Messages. Reference number: [id number].   7/22/2025 9:25:11 PM CDT

## 2025-08-01 DIAGNOSIS — G43.809 OTHER MIGRAINE WITHOUT STATUS MIGRAINOSUS, NOT INTRACTABLE: ICD-10-CM

## 2025-08-01 RX ORDER — NAPROXEN 500 MG/1
500 TABLET ORAL 2 TIMES DAILY WITH MEALS
Qty: 180 TABLET | Refills: 3 | Status: SHIPPED | OUTPATIENT
Start: 2025-08-01

## 2025-08-01 NOTE — TELEPHONE ENCOUNTER
No care due was identified.  [facility] Embedded Care Due Messages. Reference number: [id number].   8/01/2025 2:00:58 AM CDT

## 2025-08-05 ENCOUNTER — OFFICE VISIT (OUTPATIENT)
Dept: PRIMARY CARE CLINIC | Facility: CLINIC | Age: 60
End: 2025-08-05
Payer: OTHER GOVERNMENT

## 2025-08-05 VITALS — HEIGHT: 61 IN | BODY MASS INDEX: 31.72 KG/M2 | WEIGHT: 168 LBS

## 2025-08-05 DIAGNOSIS — E66.811 CLASS 1 OBESITY DUE TO EXCESS CALORIES WITH SERIOUS COMORBIDITY AND BODY MASS INDEX (BMI) OF 31.0 TO 31.9 IN ADULT: ICD-10-CM

## 2025-08-05 DIAGNOSIS — E78.49 OTHER HYPERLIPIDEMIA: ICD-10-CM

## 2025-08-05 DIAGNOSIS — E66.09 CLASS 1 OBESITY DUE TO EXCESS CALORIES WITH SERIOUS COMORBIDITY AND BODY MASS INDEX (BMI) OF 31.0 TO 31.9 IN ADULT: ICD-10-CM

## 2025-08-05 DIAGNOSIS — F41.8 MIXED ANXIETY DEPRESSIVE DISORDER: Primary | ICD-10-CM

## 2025-08-05 PROCEDURE — G2211 COMPLEX E/M VISIT ADD ON: HCPCS | Mod: 95,,, | Performed by: FAMILY MEDICINE

## 2025-08-05 PROCEDURE — 98006 SYNCH AUDIO-VIDEO EST MOD 30: CPT | Mod: 95,,, | Performed by: FAMILY MEDICINE

## 2025-08-05 RX ORDER — PHENTERMINE AND TOPIRAMATE EXTENDED-RELEASE 15; 92 MG/1; MG/1
1 CAPSULE ORAL DAILY
Qty: 90 CAPSULE | Refills: 0 | Status: SHIPPED | OUTPATIENT
Start: 2025-08-05

## 2025-08-05 NOTE — PROGRESS NOTES
Chief Complaint  Chief Complaint   Patient presents with    Follow-up     12 wk f/u        HPI  Tova Muñoz is a 60 y.o. female with multiple medical diagnoses as listed in the medical history and problem list that presents for  virtual visit.       History of Present Illness              Pmh, Psh, Family Hx, Social Hx updated in Epic Tabs today.     Review of Systems   Constitutional:  Negative for activity change, appetite change, fatigue and unexpected weight change.   HENT:  Negative for hearing loss, rhinorrhea and trouble swallowing.    Eyes:  Negative for discharge and visual disturbance.   Respiratory:  Negative for cough, chest tightness, shortness of breath and wheezing.    Cardiovascular:  Negative for chest pain and palpitations.   Gastrointestinal:  Negative for abdominal distention, abdominal pain, blood in stool, constipation, diarrhea and vomiting.   Endocrine: Negative for polydipsia and polyuria.   Genitourinary:  Negative for difficulty urinating, dysuria, hematuria and menstrual problem.   Musculoskeletal:  Positive for arthralgias and joint swelling. Negative for neck pain.   Neurological:  Positive for numbness and headaches. Negative for weakness.   Psychiatric/Behavioral:  Positive for dysphoric mood. Negative for confusion and sleep disturbance. The patient is not nervous/anxious.            Physical Exam  Vitals reviewed.   Constitutional:       General: She is awake. She is not in acute distress.     Appearance: Normal appearance. She is well-developed and well-groomed. She is obese. She is not ill-appearing.   HENT:      Head: Normocephalic and atraumatic.      Right Ear: External ear normal.      Left Ear: External ear normal.      Nose: Nose normal.      Mouth/Throat:      Lips: Pink.   Eyes:      Conjunctiva/sclera: Conjunctivae normal.   Pulmonary:      Effort: Pulmonary effort is normal.   Neurological:      Mental Status: She is alert.   Psychiatric:         Attention and  Perception: Attention and perception normal. She is attentive.         Mood and Affect: Mood and affect normal. Mood is not anxious or depressed. Affect is not labile, blunt, angry or inappropriate.         Speech: Speech normal. She is communicative. Speech is not rapid and pressured, delayed, slurred or tangential.         Behavior: Behavior normal. Behavior is not agitated, slowed, aggressive, withdrawn, hyperactive or combative. Behavior is cooperative.         Thought Content: Thought content normal. Thought content is not paranoid or delusional. Thought content does not include homicidal or suicidal ideation. Thought content does not include homicidal or suicidal plan.         Cognition and Memory: Cognition and memory normal. Memory is not impaired. She does not exhibit impaired recent memory or impaired remote memory.         Judgment: Judgment normal. Judgment is not impulsive or inappropriate.       Physical Exam                ASSESSMENT/PLAN:  1. Mixed anxiety depressive disorder    2. Class 1 obesity due to excess calories with serious comorbidity and body mass index (BMI) of 31.0 to 31.9 in adult  -     phentermine-topiramate (QSYMIA) 15-92 mg CM24; Take 1 capsule by mouth Daily.  Dispense: 90 capsule; Refill: 0    3. Other hyperlipidemia      Assessment & Plan            Mammo Digital Screening Bilat w/ Jonny (XPD)  Narrative: Facility:  [facility] [address] [place] [id number] [phone]    Name: Tova Muñoz    MRN: [id number]    Result:  Mammo Digital Screening Bilat w/ Jonny (XPD)    History:  Patient is 60 y.o. and is seen for a screening mammogram.    Films Compared:  Compared to: 05/09/2024 Mammo Digital Screening Bilat w/ Jonny, 04/14/2023   Mammo Digital Screening Bilat w/ Jonny, and 03/10/2022 Mammo Digital   Screening Bilat w/ Jonny     Findings:  This procedure was performed using tomosynthesis.   Computer-aided detection was utilized in the  interpretation of this   examination.    There are scattered areas of fibroglandular density. There is no evidence   of suspicious masses, microcalcifications or architectural distortion.  Impression:    No mammographic evidence of malignancy.    BI-RADS Category 1: Negative    Recommendation:  Routine screening mammogram in 1 year is recommended.    Your estimated lifetime risk of breast cancer (to age 85) based on   Tyrer-Cuzick risk assessment model is 10.36%.  According to the American   Cancer Society, patients with a lifetime breast cancer risk of 20% or   higher might benefit from supplemental screening tests, such as screening   breast MRI.    Electronically signed by,  Merrill Novak MD    Lab Results   Component Value Date    WBC 3.87 (L) 01/31/2025    HGB 12.2 01/31/2025    HCT 38.4 01/31/2025     01/31/2025    CHOL 187 01/31/2025    TRIG 61 01/31/2025    HDL 66 01/31/2025    ALT 20 01/31/2025    AST 18 01/31/2025     01/31/2025    K 4.1 01/31/2025     01/31/2025    CREATININE 0.8 01/31/2025    BUN 27 (H) 01/31/2025    CO2 24 01/31/2025    TSH 2.104 01/31/2025    HGBA1C 5.4 01/31/2025       Follow-up: Follow up in about 12 weeks (around 10/28/2025) for VV Marisabel NP- wt loss meds 12 wk .    ______________________________________________________________________    Face to Face time with patient:  7:00 AM CDT - 721am     The patient location is:  home  The chief complaint leading to consultation is: noted   Visit type: Virtual visit with synchronous audio and video   Each patient to whom he or she provides medical services by telemedicine is:  (1) informed of the relationship between the physician and patient and the respective role of any other health care provider with respect to management of the patient; and (2) notified that he or she may decline to receive medical services by telemedicine and may withdraw from such care at any time.    I spent a total of   30    minutes face to face  and non-face to face on the date of this visit.This includes time preparing to see the patient (eg, review of tests, notes), obtaining and/or reviewing additional history from an independent historian and/or outside medical records, documenting clinical information in the electronic health record, independently interpreting results and/or communicating results to the patient/family/caregiver, or care coordinator.  Visit today included increased complexity associated with the care of the episodic problem addressed and managing the longitudinal care of the patient due to the serious and/or complex managed problem(s).    This note was generated with the assistance of ambient listening technology. Verbal consent was obtained by the patient and accompanying visitor(s) for the recording of patient appointment to facilitate this note. I attest to having reviewed and edited the generated note for accuracy, though some syntax or spelling errors may persist. Please contact the author of this note for any clarification.

## 2025-09-05 ENCOUNTER — TELEPHONE (OUTPATIENT)
Dept: PSYCHIATRY | Facility: CLINIC | Age: 60
End: 2025-09-05
Payer: OTHER GOVERNMENT